# Patient Record
Sex: FEMALE | Race: WHITE | NOT HISPANIC OR LATINO | Employment: OTHER | URBAN - NONMETROPOLITAN AREA
[De-identification: names, ages, dates, MRNs, and addresses within clinical notes are randomized per-mention and may not be internally consistent; named-entity substitution may affect disease eponyms.]

---

## 2017-02-14 DIAGNOSIS — Z12.31 ENCOUNTER FOR SCREENING MAMMOGRAM FOR MALIGNANT NEOPLASM OF BREAST: Primary | ICD-10-CM

## 2017-02-15 ENCOUNTER — LAB (OUTPATIENT)
Dept: LAB | Facility: OTHER | Age: 72
End: 2017-02-15

## 2017-02-15 DIAGNOSIS — E78.00 HYPERCHOLESTEROLEMIA: ICD-10-CM

## 2017-02-15 DIAGNOSIS — E55.9 VITAMIN D DEFICIENCY: ICD-10-CM

## 2017-02-15 DIAGNOSIS — D51.9 ANEMIA DUE TO VITAMIN B12 DEFICIENCY: ICD-10-CM

## 2017-02-15 LAB
ALBUMIN SERPL-MCNC: 4.2 G/DL (ref 3.2–5.5)
ALBUMIN/GLOB SERPL: 1.2 G/DL (ref 1–3)
ALP SERPL-CCNC: 67 U/L (ref 15–121)
ALT SERPL W P-5'-P-CCNC: 13 U/L (ref 10–60)
ANION GAP SERPL CALCULATED.3IONS-SCNC: 7 MMOL/L (ref 5–15)
AST SERPL-CCNC: 19 U/L (ref 10–60)
BASOPHILS # BLD AUTO: 0.03 10*3/MM3 (ref 0–0.2)
BASOPHILS NFR BLD AUTO: 0.5 % (ref 0–2)
BILIRUB SERPL-MCNC: 0.9 MG/DL (ref 0.2–1)
BUN BLD-MCNC: 15 MG/DL (ref 8–25)
BUN/CREAT SERPL: 18.8 (ref 7–25)
CALCIUM SPEC-SCNC: 9.5 MG/DL (ref 8.4–10.8)
CHLORIDE SERPL-SCNC: 106 MMOL/L (ref 100–112)
CHOLEST SERPL-MCNC: 210 MG/DL (ref 150–200)
CO2 SERPL-SCNC: 29 MMOL/L (ref 20–32)
CREAT BLD-MCNC: 0.8 MG/DL (ref 0.4–1.3)
DEPRECATED RDW RBC AUTO: 42.7 FL (ref 36.4–46.3)
EOSINOPHIL # BLD AUTO: 0.33 10*3/MM3 (ref 0–0.7)
EOSINOPHIL NFR BLD AUTO: 6 % (ref 0–7)
ERYTHROCYTE [DISTWIDTH] IN BLOOD BY AUTOMATED COUNT: 13.4 % (ref 11.5–14.5)
GFR SERPL CREATININE-BSD FRML MDRD: 71 ML/MIN/1.73 (ref 39–90)
GLOBULIN UR ELPH-MCNC: 3.6 GM/DL (ref 2.5–4.6)
GLUCOSE BLD-MCNC: 88 MG/DL (ref 70–100)
HCT VFR BLD AUTO: 38.4 % (ref 35–45)
HDLC SERPL-MCNC: 70 MG/DL (ref 35–100)
HGB BLD-MCNC: 12.5 G/DL (ref 12–15.5)
LDLC SERPL CALC-MCNC: 126 MG/DL
LDLC/HDLC SERPL: 1.8 {RATIO}
LYMPHOCYTES # BLD AUTO: 2.29 10*3/MM3 (ref 0.6–4.2)
LYMPHOCYTES NFR BLD AUTO: 41.6 % (ref 10–50)
MCH RBC QN AUTO: 28.9 PG (ref 26.5–34)
MCHC RBC AUTO-ENTMCNC: 32.6 G/DL (ref 31.4–36)
MCV RBC AUTO: 88.9 FL (ref 80–98)
MONOCYTES # BLD AUTO: 0.47 10*3/MM3 (ref 0–0.9)
MONOCYTES NFR BLD AUTO: 8.5 % (ref 0–12)
NEUTROPHILS # BLD AUTO: 2.39 10*3/MM3 (ref 2–8.6)
NEUTROPHILS NFR BLD AUTO: 43.4 % (ref 37–80)
PLATELET # BLD AUTO: 282 10*3/MM3 (ref 150–450)
PMV BLD AUTO: 9.6 FL (ref 8–12)
POTASSIUM BLD-SCNC: 4 MMOL/L (ref 3.4–5.4)
PROT SERPL-MCNC: 7.8 G/DL (ref 6.7–8.2)
RBC # BLD AUTO: 4.32 10*6/MM3 (ref 3.77–5.16)
SODIUM BLD-SCNC: 142 MMOL/L (ref 134–146)
TRIGL SERPL-MCNC: 69 MG/DL (ref 35–160)
VLDLC SERPL-MCNC: 13.8 MG/DL
WBC NRBC COR # BLD: 5.51 10*3/MM3 (ref 3.2–9.8)

## 2017-02-15 PROCEDURE — 85025 COMPLETE CBC W/AUTO DIFF WBC: CPT | Performed by: INTERNAL MEDICINE

## 2017-02-15 PROCEDURE — 80061 LIPID PANEL: CPT | Performed by: INTERNAL MEDICINE

## 2017-02-15 PROCEDURE — 82607 VITAMIN B-12: CPT | Performed by: INTERNAL MEDICINE

## 2017-02-15 PROCEDURE — 80053 COMPREHEN METABOLIC PANEL: CPT | Performed by: INTERNAL MEDICINE

## 2017-02-15 PROCEDURE — 82306 VITAMIN D 25 HYDROXY: CPT | Performed by: INTERNAL MEDICINE

## 2017-02-15 PROCEDURE — 36415 COLL VENOUS BLD VENIPUNCTURE: CPT | Performed by: INTERNAL MEDICINE

## 2017-02-16 ENCOUNTER — OFFICE VISIT (OUTPATIENT)
Dept: FAMILY MEDICINE CLINIC | Facility: CLINIC | Age: 72
End: 2017-02-16

## 2017-02-16 VITALS
SYSTOLIC BLOOD PRESSURE: 124 MMHG | BODY MASS INDEX: 23.79 KG/M2 | HEIGHT: 59 IN | DIASTOLIC BLOOD PRESSURE: 70 MMHG | TEMPERATURE: 96.8 F | WEIGHT: 118 LBS | HEART RATE: 88 BPM

## 2017-02-16 DIAGNOSIS — E55.9 VITAMIN D DEFICIENCY: ICD-10-CM

## 2017-02-16 DIAGNOSIS — E78.00 HYPERCHOLESTEROLEMIA: ICD-10-CM

## 2017-02-16 DIAGNOSIS — M81.0 OSTEOPOROSIS: ICD-10-CM

## 2017-02-16 DIAGNOSIS — K21.9 GASTROESOPHAGEAL REFLUX DISEASE, ESOPHAGITIS PRESENCE NOT SPECIFIED: ICD-10-CM

## 2017-02-16 DIAGNOSIS — I10 ESSENTIAL (PRIMARY) HYPERTENSION: Primary | ICD-10-CM

## 2017-02-16 DIAGNOSIS — D51.9 ANEMIA DUE TO VITAMIN B12 DEFICIENCY, UNSPECIFIED B12 DEFICIENCY TYPE: ICD-10-CM

## 2017-02-16 LAB
25(OH)D3 SERPL-MCNC: 43.1 NG/ML (ref 30–100)
VIT B12 BLD-MCNC: 734 PG/ML (ref 239–931)

## 2017-02-16 PROCEDURE — 99214 OFFICE O/P EST MOD 30 MIN: CPT | Performed by: INTERNAL MEDICINE

## 2017-02-16 NOTE — PROGRESS NOTES
"Subjective     Court Johnson is a 71 y.o. female.     History of Present Illness   Court is here for six-month follow-up of hypertension, vitamin D deficiency, osteoporosis, anemia due to vitamin B12 deficiency, and other issues.    She will be due for a repeat DEXA approximately March 2018.  Last DEXA demonstrated considerable improvement with Prolia injections.  She will receive Prolia injections twice this year and then anticipate repeating the DEXA in March 2018 prior to further DEXA injections.  Use calcium and vitamin D supplement.  She denies frequent falls.    Her blood pressure is well controlled today with lisinopril.  Her weight is stable.    Her labs are all reviewed with results listed below.  CBC and CMP are unremarkable.  B12 and vitamin D levels are at goal.  Lipids are at goal with an excellent ratio.      Review of Systems   Constitutional: Negative for chills, fatigue and fever.   HENT: Negative for congestion, ear pain, postnasal drip, sinus pressure and sore throat.    Respiratory: Negative for cough, shortness of breath and wheezing.    Cardiovascular: Negative for chest pain, palpitations and leg swelling.   Gastrointestinal: Negative for abdominal pain, blood in stool, constipation, diarrhea, nausea and vomiting.   Endocrine: Negative for cold intolerance, heat intolerance, polydipsia and polyuria.   Genitourinary: Negative for dysuria, frequency, hematuria and urgency.   Skin: Negative for rash.   Neurological: Negative for syncope and weakness.   Psychiatric/Behavioral: Negative for sleep disturbance.       Objective     Visit Vitals   • /70   • Pulse 88   • Temp 96.8 °F (36 °C) (Oral)   • Ht 59\" (149.9 cm)   • Wt 118 lb (53.5 kg)   • BMI 23.83 kg/m2       Physical Exam   Constitutional: She is oriented to person, place, and time. She appears well-developed and well-nourished. No distress.   Very pleasant patient   HENT:   Nose: Right sinus exhibits no maxillary sinus " tenderness and no frontal sinus tenderness. Left sinus exhibits no maxillary sinus tenderness and no frontal sinus tenderness.   Mouth/Throat: Uvula is midline, oropharynx is clear and moist and mucous membranes are normal. No oral lesions. No tonsillar exudate.   Eyes: Conjunctivae and EOM are normal. Pupils are equal, round, and reactive to light.   Neck: Trachea normal. Neck supple. No JVD present. Carotid bruit is not present. No tracheal deviation present. No thyroid mass and no thyromegaly present.   Cardiovascular: Normal rate and regular rhythm.   No extrasystoles are present. PMI is not displaced.    Murmur (2/6 systolic murmur heard best at left sternal border.  No significant peripheral edema) heard.  Pulmonary/Chest: No accessory muscle usage. No respiratory distress. She has no decreased breath sounds. She has no wheezes. She has no rhonchi. She has no rales.   A few mild chronic lung sounds   Abdominal: Soft. Bowel sounds are normal. She exhibits no distension. There is no hepatosplenomegaly. There is no tenderness.       Vascular Status -  Her exam exhibits right foot vasculature abnormal and no right foot edema. Her exam exhibits left foot vasculature abnormal and no left foot edema.  Lymphadenopathy:     She has no cervical adenopathy.   Neurological: She is alert and oriented to person, place, and time. No cranial nerve deficit. Coordination normal.   Skin: Skin is warm, dry and intact. No rash noted. No cyanosis. Nails show no clubbing.   Psychiatric: She has a normal mood and affect. Her speech is normal and behavior is normal. Thought content normal.   Vitals reviewed.      Assessment/Plan     Diagnoses and all orders for this visit:    Essential (primary) hypertension  -     Comprehensive Metabolic Panel; Future    Hypercholesterolemia    Vitamin D deficiency  -     Vitamin D 25 Hydroxy; Future    Gastroesophageal reflux disease, esophagitis presence not specified    Anemia due to vitamin B12  deficiency, unspecified B12 deficiency type  -     Vitamin B12; Future  -     CBC Auto Differential; Future    Osteoporosis  -     Vitamin D 25 Hydroxy; Future    Other orders  -     PROLIA 60 MG/ML solution syringe; Inject 1 dose under the skin Every 6 (Six) Months.        Lab on 02/15/2017   Component Date Value Ref Range Status   • WBC 02/15/2017 5.51  3.20 - 9.80 10*3/mm3 Final   • RBC 02/15/2017 4.32  3.77 - 5.16 10*6/mm3 Final   • Hemoglobin 02/15/2017 12.5  12.0 - 15.5 g/dL Final   • Hematocrit 02/15/2017 38.4  35.0 - 45.0 % Final   • MCV 02/15/2017 88.9  80.0 - 98.0 fL Final   • MCH 02/15/2017 28.9  26.5 - 34.0 pg Final   • MCHC 02/15/2017 32.6  31.4 - 36.0 g/dL Final   • RDW 02/15/2017 13.4  11.5 - 14.5 % Final   • RDW-SD 02/15/2017 42.7  36.4 - 46.3 fl Final   • MPV 02/15/2017 9.6  8.0 - 12.0 fL Final   • Platelets 02/15/2017 282  150 - 450 10*3/mm3 Final   • Neutrophil % 02/15/2017 43.4  37.0 - 80.0 % Final   • Lymphocyte % 02/15/2017 41.6  10.0 - 50.0 % Final   • Monocyte % 02/15/2017 8.5  0.0 - 12.0 % Final   • Eosinophil % 02/15/2017 6.0  0.0 - 7.0 % Final   • Basophil % 02/15/2017 0.5  0.0 - 2.0 % Final   • Neutrophils, Absolute 02/15/2017 2.39  2.00 - 8.60 10*3/mm3 Final   • Lymphocytes, Absolute 02/15/2017 2.29  0.60 - 4.20 10*3/mm3 Final   • Monocytes, Absolute 02/15/2017 0.47  0.00 - 0.90 10*3/mm3 Final   • Eosinophils, Absolute 02/15/2017 0.33  0.00 - 0.70 10*3/mm3 Final   • Basophils, Absolute 02/15/2017 0.03  0.00 - 0.20 10*3/mm3 Final   • Total Cholesterol 02/15/2017 210* 150 - 200 mg/dL Final   • Triglycerides 02/15/2017 69  35 - 160 mg/dL Final   • HDL Cholesterol 02/15/2017 70  35 - 100 mg/dL Final   • LDL Cholesterol  02/15/2017 126  mg/dL Final   • VLDL Cholesterol 02/15/2017 13.8  mg/dL Final   • LDL/HDL Ratio 02/15/2017 1.80   Final   • 25 Hydroxy, Vitamin D 02/15/2017 43.1  30.0 - 100.0 ng/ml Final   • Vitamin B-12 02/15/2017 734  239 - 931 pg/mL Final   • Glucose 02/15/2017 88  70 -  100 mg/dL Final   • BUN 02/15/2017 15  8 - 25 mg/dL Final   • Creatinine 02/15/2017 0.80  0.40 - 1.30 mg/dL Final   • Sodium 02/15/2017 142  134 - 146 mmol/L Final   • Potassium 02/15/2017 4.0  3.4 - 5.4 mmol/L Final   • Chloride 02/15/2017 106  100 - 112 mmol/L Final   • CO2 02/15/2017 29.0  20.0 - 32.0 mmol/L Final   • Calcium 02/15/2017 9.5  8.4 - 10.8 mg/dL Final   • Total Protein 02/15/2017 7.8  6.7 - 8.2 g/dL Final   • Albumin 02/15/2017 4.20  3.20 - 5.50 g/dL Final   • ALT (SGPT) 02/15/2017 13  10 - 60 U/L Final   • AST (SGOT) 02/15/2017 19  10 - 60 U/L Final   • Alkaline Phosphatase 02/15/2017 67  15 - 121 U/L Final   • Total Bilirubin 02/15/2017 0.9  0.2 - 1.0 mg/dL Final   • eGFR Non African Amer 02/15/2017 71  39 - 90 mL/min/1.73 Final   • Globulin 02/15/2017 3.6  2.5 - 4.6 gm/dL Final   • A/G Ratio 02/15/2017 1.2  1.0 - 3.0 g/dL Final   • BUN/Creatinine Ratio 02/15/2017 18.8  7.0 - 25.0 Final   • Anion Gap 02/15/2017 7.0  5.0 - 15.0 mmol/L Final   ]

## 2017-03-15 ENCOUNTER — CLINICAL SUPPORT (OUTPATIENT)
Dept: FAMILY MEDICINE CLINIC | Facility: CLINIC | Age: 72
End: 2017-03-15

## 2017-03-15 DIAGNOSIS — M81.0 OSTEOPOROSIS: Primary | ICD-10-CM

## 2017-03-15 PROCEDURE — 96372 THER/PROPH/DIAG INJ SC/IM: CPT | Performed by: INTERNAL MEDICINE

## 2017-04-10 RX ORDER — LISINOPRIL 20 MG/1
TABLET ORAL
Qty: 30 TABLET | Refills: 0 | Status: SHIPPED | OUTPATIENT
Start: 2017-04-10 | End: 2017-05-15 | Stop reason: SDUPTHER

## 2017-05-15 RX ORDER — LISINOPRIL 20 MG/1
TABLET ORAL
Qty: 30 TABLET | Refills: 11 | Status: SHIPPED | OUTPATIENT
Start: 2017-05-15 | End: 2018-02-19 | Stop reason: SDUPTHER

## 2017-08-09 ENCOUNTER — LAB (OUTPATIENT)
Dept: LAB | Facility: OTHER | Age: 72
End: 2017-08-09

## 2017-08-09 DIAGNOSIS — E55.9 VITAMIN D DEFICIENCY: ICD-10-CM

## 2017-08-09 DIAGNOSIS — M81.0 OSTEOPOROSIS: ICD-10-CM

## 2017-08-09 DIAGNOSIS — D51.9 ANEMIA DUE TO VITAMIN B12 DEFICIENCY, UNSPECIFIED B12 DEFICIENCY TYPE: ICD-10-CM

## 2017-08-09 DIAGNOSIS — I10 ESSENTIAL (PRIMARY) HYPERTENSION: ICD-10-CM

## 2017-08-09 LAB
ALBUMIN SERPL-MCNC: 4.1 G/DL (ref 3.2–5.5)
ALBUMIN/GLOB SERPL: 1.2 G/DL (ref 1–3)
ALP SERPL-CCNC: 64 U/L (ref 15–121)
ALT SERPL W P-5'-P-CCNC: 14 U/L (ref 10–60)
ANION GAP SERPL CALCULATED.3IONS-SCNC: 10 MMOL/L (ref 5–15)
AST SERPL-CCNC: 18 U/L (ref 10–60)
BASOPHILS # BLD AUTO: 0.02 10*3/MM3 (ref 0–0.2)
BASOPHILS NFR BLD AUTO: 0.4 % (ref 0–2)
BILIRUB SERPL-MCNC: 0.7 MG/DL (ref 0.2–1)
BUN BLD-MCNC: 12 MG/DL (ref 8–25)
BUN/CREAT SERPL: 13.3 (ref 7–25)
CALCIUM SPEC-SCNC: 9.1 MG/DL (ref 8.4–10.8)
CHLORIDE SERPL-SCNC: 104 MMOL/L (ref 100–112)
CO2 SERPL-SCNC: 28 MMOL/L (ref 20–32)
CREAT BLD-MCNC: 0.9 MG/DL (ref 0.4–1.3)
DEPRECATED RDW RBC AUTO: 42.4 FL (ref 36.4–46.3)
EOSINOPHIL # BLD AUTO: 0.37 10*3/MM3 (ref 0–0.7)
EOSINOPHIL NFR BLD AUTO: 7.5 % (ref 0–7)
ERYTHROCYTE [DISTWIDTH] IN BLOOD BY AUTOMATED COUNT: 13.3 % (ref 11.5–14.5)
GFR SERPL CREATININE-BSD FRML MDRD: 62 ML/MIN/1.73 (ref 39–90)
GLOBULIN UR ELPH-MCNC: 3.4 GM/DL (ref 2.5–4.6)
GLUCOSE BLD-MCNC: 94 MG/DL (ref 70–100)
HCT VFR BLD AUTO: 37.4 % (ref 35–45)
HGB BLD-MCNC: 12.3 G/DL (ref 12–15.5)
LYMPHOCYTES # BLD AUTO: 1.77 10*3/MM3 (ref 0.6–4.2)
LYMPHOCYTES NFR BLD AUTO: 35.7 % (ref 10–50)
MCH RBC QN AUTO: 29.4 PG (ref 26.5–34)
MCHC RBC AUTO-ENTMCNC: 32.9 G/DL (ref 31.4–36)
MCV RBC AUTO: 89.3 FL (ref 80–98)
MONOCYTES # BLD AUTO: 0.46 10*3/MM3 (ref 0–0.9)
MONOCYTES NFR BLD AUTO: 9.3 % (ref 0–12)
NEUTROPHILS # BLD AUTO: 2.34 10*3/MM3 (ref 2–8.6)
NEUTROPHILS NFR BLD AUTO: 47.1 % (ref 37–80)
PLATELET # BLD AUTO: 243 10*3/MM3 (ref 150–450)
PMV BLD AUTO: 10 FL (ref 8–12)
POTASSIUM BLD-SCNC: 4.4 MMOL/L (ref 3.4–5.4)
PROT SERPL-MCNC: 7.5 G/DL (ref 6.7–8.2)
RBC # BLD AUTO: 4.19 10*6/MM3 (ref 3.77–5.16)
SODIUM BLD-SCNC: 142 MMOL/L (ref 134–146)
WBC NRBC COR # BLD: 4.96 10*3/MM3 (ref 3.2–9.8)

## 2017-08-09 PROCEDURE — 82607 VITAMIN B-12: CPT | Performed by: INTERNAL MEDICINE

## 2017-08-09 PROCEDURE — 82306 VITAMIN D 25 HYDROXY: CPT | Performed by: INTERNAL MEDICINE

## 2017-08-09 PROCEDURE — 80053 COMPREHEN METABOLIC PANEL: CPT | Performed by: INTERNAL MEDICINE

## 2017-08-09 PROCEDURE — 36415 COLL VENOUS BLD VENIPUNCTURE: CPT | Performed by: INTERNAL MEDICINE

## 2017-08-09 PROCEDURE — 85025 COMPLETE CBC W/AUTO DIFF WBC: CPT | Performed by: INTERNAL MEDICINE

## 2017-08-10 LAB
25(OH)D3 SERPL-MCNC: 53.8 NG/ML (ref 30–100)
VIT B12 BLD-MCNC: 748 PG/ML (ref 239–931)

## 2017-08-16 ENCOUNTER — OFFICE VISIT (OUTPATIENT)
Dept: FAMILY MEDICINE CLINIC | Facility: CLINIC | Age: 72
End: 2017-08-16

## 2017-08-16 VITALS
HEIGHT: 59 IN | TEMPERATURE: 98.1 F | WEIGHT: 112.8 LBS | SYSTOLIC BLOOD PRESSURE: 150 MMHG | BODY MASS INDEX: 22.74 KG/M2 | DIASTOLIC BLOOD PRESSURE: 80 MMHG | HEART RATE: 88 BPM

## 2017-08-16 DIAGNOSIS — I10 ESSENTIAL (PRIMARY) HYPERTENSION: Primary | Chronic | ICD-10-CM

## 2017-08-16 DIAGNOSIS — E55.9 VITAMIN D DEFICIENCY: Chronic | ICD-10-CM

## 2017-08-16 DIAGNOSIS — J30.1 SEASONAL ALLERGIC RHINITIS DUE TO POLLEN: Chronic | ICD-10-CM

## 2017-08-16 DIAGNOSIS — E78.00 HYPERCHOLESTEROLEMIA: Chronic | ICD-10-CM

## 2017-08-16 DIAGNOSIS — D51.9 ANEMIA DUE TO VITAMIN B12 DEFICIENCY, UNSPECIFIED B12 DEFICIENCY TYPE: Chronic | ICD-10-CM

## 2017-08-16 DIAGNOSIS — M81.0 OSTEOPOROSIS: Chronic | ICD-10-CM

## 2017-08-16 DIAGNOSIS — K21.9 GASTROESOPHAGEAL REFLUX DISEASE, ESOPHAGITIS PRESENCE NOT SPECIFIED: Chronic | ICD-10-CM

## 2017-08-16 PROCEDURE — 99214 OFFICE O/P EST MOD 30 MIN: CPT | Performed by: INTERNAL MEDICINE

## 2017-08-16 RX ORDER — ALENDRONATE SODIUM 70 MG/1
70 TABLET ORAL
Qty: 4 TABLET | Refills: 12 | Status: SHIPPED | OUTPATIENT
Start: 2017-08-16 | End: 2017-09-01 | Stop reason: SINTOL

## 2017-08-16 RX ORDER — RANITIDINE 150 MG/1
150 TABLET ORAL 2 TIMES DAILY
Qty: 60 TABLET | Refills: 11 | Status: SHIPPED | OUTPATIENT
Start: 2017-08-16 | End: 2018-10-05 | Stop reason: DRUGHIGH

## 2017-08-16 NOTE — PROGRESS NOTES
Subjective     History of Present Illness     Court Johnson is a 72 y.o. female who presents for six-month follow-up of hypertension, vitamin D deficiency, osteoporosis, anemia due to vitamin B12 deficiency, and other issues.  Her grandson, Guanaco, is currently living with her and her .  He is a senior at the local high school.  She denies GERD/gastritis symptoms.    DEXA 03/2016 revealed osteopenia, which demonstrated considerable improvement with Prolia injections.  Her next Prolia injection is due next month.  She reports she is unable to afford the Prolia injections, as these are not covered by her insurance.  Her previous injections were paid through a rd.  We discussed treatment options.  I am going to have her stop the Prolia injections and start weekly Fosamax.  She was intolerant to Boniva in the past due to unusual symptoms.  She reports that she felt like she could not move after her first dose of Fosamax.  It sounds like she may be describing reflux symptoms that triggered anxiety.  She denied anaphylactic reaction.  Her evaluation in the emergency room failed to reveal the etiology.  We discussed appropriate dosing directions and she is given a prescription for Zantac to premedicate with the Fosamax.  She continues on vitamin D and calcium supplements.  She is to notify me of any adverse reaction to the Fosamax.  She will be due repeat DEXA 03/2018.    She reports onset of postnasal drainage and clear nasal discharge.  She reports an episodic nonproductive cough.  I recommended Claritin (loratadine) 10 mg daily for symptom relief of allergy/URI symptoms.     Weight is down 6 pounds in the past six months.  Blood pressure is slightly above goal today, but she did not take it prior to her visit today.  She is to take her dose of  lisinopril when she gets back home.      The patient's relevant past medical, surgical, and social history was reviewed in Epic.   Lab results are reviewed with  "the patient today. CBC unremarkable.  Liver and renal function normal.  Vitamin D and vitamin B-12 at goal with current oral supplements.          Review of Systems   Constitutional: Negative for chills, fatigue and fever.   HENT: Negative for congestion, ear pain, postnasal drip, sinus pressure and sore throat.    Respiratory: Negative for cough, shortness of breath and wheezing.    Cardiovascular: Negative for chest pain, palpitations and leg swelling.   Gastrointestinal: Negative for abdominal pain, blood in stool, constipation, diarrhea, nausea and vomiting.   Endocrine: Negative for cold intolerance, heat intolerance, polydipsia and polyuria.   Genitourinary: Negative for dysuria, frequency, hematuria and urgency.   Skin: Negative for rash.   Neurological: Negative for syncope and weakness.      PHQ-9 Depression Screening 8/16/2017   Little interest or pleasure in doing things 0   Feeling down, depressed, or hopeless 0   PHQ-9 Total Score 0         Objective      Vitals:    08/16/17 0936   BP: 150/80   Pulse: 88   Temp: 98.1 °F (36.7 °C)   TempSrc: Oral   Weight: 112 lb 12.8 oz (51.2 kg)   Height: 59\" (149.9 cm)       Physical Exam   Constitutional: She is oriented to person, place, and time. She appears well-developed and well-nourished. No distress.   Very pleasant patient    HENT:   Head: Normocephalic and atraumatic.   Nose: Right sinus exhibits no maxillary sinus tenderness and no frontal sinus tenderness. Left sinus exhibits no maxillary sinus tenderness and no frontal sinus tenderness.   Mouth/Throat: Uvula is midline, oropharynx is clear and moist and mucous membranes are normal. No oral lesions. No tonsillar exudate.   Clear postnasal drip.    Eyes: Conjunctivae and EOM are normal. Pupils are equal, round, and reactive to light.   Neck: Trachea normal. Neck supple. No JVD present. Carotid bruit is not present. No tracheal deviation present. No thyroid mass and no thyromegaly present.   Cardiovascular: " Normal rate and regular rhythm.   No extrasystoles are present. PMI is not displaced.    Murmur heard.  2/6 systolic murmur heard best at left sternal border.   Pulmonary/Chest: Effort normal and breath sounds normal. No accessory muscle usage. No respiratory distress. She has no decreased breath sounds. She has no wheezes. She has no rhonchi. She has no rales.   A few chronic lung sounds.    Abdominal: Soft. Bowel sounds are normal. She exhibits no distension. There is no hepatosplenomegaly. There is no tenderness.       Vascular Status -  Her exam exhibits right foot vasculature normal. Her exam exhibits no right foot edema. Her exam exhibits left foot vasculature normal. Her exam exhibits no left foot edema.  Lymphadenopathy:     She has no cervical adenopathy.   Neurological: She is alert and oriented to person, place, and time. No cranial nerve deficit. Coordination normal.   Skin: Skin is warm, dry and intact. No rash noted. No cyanosis. Nails show no clubbing.   Psychiatric: She has a normal mood and affect. Her speech is normal and behavior is normal. Thought content normal.   Vitals reviewed.      PHQ-9 Depression Screening 8/16/2017   Little interest or pleasure in doing things 0   Feeling down, depressed, or hopeless 0   PHQ-9 Total Score 0       Assessment/Plan      Due to cost, we will stop Prolia injections and start Fosamax 70 mg weekly.  Discussed appropriate dosing directions.  She is also given a prescription for Zantac to premedicate prior to Fosamax. She is to take one tablet of Zantac the evening before and one tablet the morning she is taking the Fosamax.  She is to notify me of any adverse reaction to the Fosamax.  Continue vitamin D and calcium supplements.  She will be due repeat DEXA 03/2018.      I recommended Claritin (loratadine) 10 mg daily for the upper respiratory/allergy symptoms.     She is to take her dose of lisinopril when she gets home this morning.  Continue to monitor blood  pressure.     Continue other medications and vitamin and mineral supplements to treat additional medical problems which we addressed today.  She will return in six months for follow up with fasting labs one week prior.       Scribed for Dr. Heart by Oles[facility].     Diagnoses and all orders for this visit:    Essential (primary) hypertension  -     CBC Auto Differential; Future  -     Comprehensive Metabolic Panel; Future  -     TSH; Future    Hypercholesterolemia  -     Lipid Panel; Future  -     TSH; Future    Vitamin D deficiency    Gastroesophageal reflux disease, esophagitis presence not specified    Anemia due to vitamin B12 deficiency, unspecified B12 deficiency type  -     Vitamin B12; Future  -     CBC Auto Differential; Future    Osteoporosis - changed Prolia to Fosamax due to cost, 8/2017  -     Vitamin D 25 Hydroxy; Future  -     TSH; Future    Seasonal allergic rhinitis due to pollen    Other orders  -     alendronate (FOSAMAX) 70 MG tablet; Take 1 tablet by mouth Every 7 (Seven) Days.  -     raNITIdine (ZANTAC) 150 MG tablet; Take 1 tablet by mouth 2 (Two) Times a Day. For stomach acid        Lab on 08/09/2017   Component Date Value Ref Range Status   • Glucose 08/09/2017 94  70 - 100 mg/dL Final   • BUN 08/09/2017 12  8 - 25 mg/dL Final   • Creatinine 08/09/2017 0.90  0.40 - 1.30 mg/dL Final   • Sodium 08/09/2017 142  134 - 146 mmol/L Final   • Potassium 08/09/2017 4.4  3.4 - 5.4 mmol/L Final   • Chloride 08/09/2017 104  100 - 112 mmol/L Final   • CO2 08/09/2017 28.0  20.0 - 32.0 mmol/L Final   • Calcium 08/09/2017 9.1  8.4 - 10.8 mg/dL Final   • Total Protein 08/09/2017 7.5  6.7 - 8.2 g/dL Final   • Albumin 08/09/2017 4.10  3.20 - 5.50 g/dL Final   • ALT (SGPT) 08/09/2017 14  10 - 60 U/L Final   • AST (SGOT) 08/09/2017 18  10 - 60 U/L Final   • Alkaline Phosphatase 08/09/2017 64  15 - 121 U/L Final   • Total Bilirubin 08/09/2017 0.7  0.2 - 1.0 mg/dL Final   • eGFR Non African Amer  08/09/2017 62  39 - 90 mL/min/1.73 Final   • Globulin 08/09/2017 3.4  2.5 - 4.6 gm/dL Final   • A/G Ratio 08/09/2017 1.2  1.0 - 3.0 g/dL Final   • BUN/Creatinine Ratio 08/09/2017 13.3  7.0 - 25.0 Final   • Anion Gap 08/09/2017 10.0  5.0 - 15.0 mmol/L Final   • Vitamin B-12 08/09/2017 748  239 - 931 pg/mL Final   • 25 Hydroxy, Vitamin D 08/09/2017 53.8  30.0 - 100.0 ng/ml Final   • WBC 08/09/2017 4.96  3.20 - 9.80 10*3/mm3 Final   • RBC 08/09/2017 4.19  3.77 - 5.16 10*6/mm3 Final   • Hemoglobin 08/09/2017 12.3  12.0 - 15.5 g/dL Final   • Hematocrit 08/09/2017 37.4  35.0 - 45.0 % Final   • MCV 08/09/2017 89.3  80.0 - 98.0 fL Final   • MCH 08/09/2017 29.4  26.5 - 34.0 pg Final   • MCHC 08/09/2017 32.9  31.4 - 36.0 g/dL Final   • RDW 08/09/2017 13.3  11.5 - 14.5 % Final   • RDW-SD 08/09/2017 42.4  36.4 - 46.3 fl Final   • MPV 08/09/2017 10.0  8.0 - 12.0 fL Final   • Platelets 08/09/2017 243  150 - 450 10*3/mm3 Final   • Neutrophil % 08/09/2017 47.1  37.0 - 80.0 % Final   • Lymphocyte % 08/09/2017 35.7  10.0 - 50.0 % Final   • Monocyte % 08/09/2017 9.3  0.0 - 12.0 % Final   • Eosinophil % 08/09/2017 7.5* 0.0 - 7.0 % Final   • Basophil % 08/09/2017 0.4  0.0 - 2.0 % Final   • Neutrophils, Absolute 08/09/2017 2.34  2.00 - 8.60 10*3/mm3 Final   • Lymphocytes, Absolute 08/09/2017 1.77  0.60 - 4.20 10*3/mm3 Final   • Monocytes, Absolute 08/09/2017 0.46  0.00 - 0.90 10*3/mm3 Final   • Eosinophils, Absolute 08/09/2017 0.37  0.00 - 0.70 10*3/mm3 Final   • Basophils, Absolute 08/09/2017 0.02  0.00 - 0.20 10*3/mm3 Final   ]

## 2017-09-01 ENCOUNTER — OFFICE VISIT (OUTPATIENT)
Dept: FAMILY MEDICINE CLINIC | Facility: CLINIC | Age: 72
End: 2017-09-01

## 2017-09-01 VITALS
HEIGHT: 59 IN | TEMPERATURE: 98 F | HEART RATE: 92 BPM | WEIGHT: 110.6 LBS | DIASTOLIC BLOOD PRESSURE: 80 MMHG | BODY MASS INDEX: 22.3 KG/M2 | SYSTOLIC BLOOD PRESSURE: 130 MMHG

## 2017-09-01 DIAGNOSIS — M81.0 OSTEOPOROSIS: Chronic | ICD-10-CM

## 2017-09-01 DIAGNOSIS — T50.905A MEDICATION SIDE EFFECT, INITIAL ENCOUNTER: Primary | ICD-10-CM

## 2017-09-01 DIAGNOSIS — K21.9 GASTROESOPHAGEAL REFLUX DISEASE WITHOUT ESOPHAGITIS: Chronic | ICD-10-CM

## 2017-09-01 PROCEDURE — 99213 OFFICE O/P EST LOW 20 MIN: CPT | Performed by: INTERNAL MEDICINE

## 2017-09-01 RX ORDER — MELOXICAM 15 MG/1
15 TABLET ORAL DAILY PRN
Qty: 15 TABLET | Refills: 0 | Status: SHIPPED | OUTPATIENT
Start: 2017-09-01 | End: 2021-03-16

## 2017-09-01 NOTE — PROGRESS NOTES
"Subjective        History of Present Illness     Court Johnson is a 72 y.o. female reporting edema and arthralgia pain, which she describes as a burning discomfort to the bilateral hands since she started on Fosamax. DEXA 02/2016 revealed osteopenia, which demonstrated considerable improvement with Prolia injections. However, she was unable to afford the Prolia and was intolerant to Boniva in the past due to unusual symptoms (feeling paralyzed).  We started her on Fosamax with a prescription for Zantac to premedicate with the Fosamax.  She didn't have symptoms with the first dose of Fosamax, but noticed the edema of the hands with a burning pain on Monday evening after taking her second dose of Fosamax Monday morning.  The discomfort has disrupted her sleep the past two nights.  She took Ibuprofen for the discomfort.   We discussed other options including Forteo, but that would be too expensive as well.  We will have her continue with calcium and vitamin D supplements and repeat her DEXA in February 2018 to re-evaluate bone density.    Review of Systems   Constitutional: Negative for chills, fatigue and fever.   HENT: Negative for congestion, ear pain, postnasal drip, sinus pressure and sore throat.    Respiratory: Negative for cough, shortness of breath and wheezing.    Cardiovascular: Negative for chest pain, palpitations and leg swelling.   Gastrointestinal: Negative for abdominal pain, blood in stool, constipation, diarrhea, nausea and vomiting.   Endocrine: Negative for cold intolerance, heat intolerance, polydipsia and polyuria.   Genitourinary: Negative for dysuria, frequency, hematuria and urgency.   Musculoskeletal:        Swelling and pain to bilateral hands.    Skin: Negative for rash.   Neurological: Negative for syncope and weakness.        Objective     Vitals:    09/01/17 0922   BP: 130/80   Pulse: 92   Temp: 98 °F (36.7 °C)   TempSrc: Oral   Weight: 110 lb 9.6 oz (50.2 kg)   Height: 59\" (149.9 " cm)     Physical Exam   Constitutional: She is oriented to person, place, and time. She appears well-developed and well-nourished. No distress.   Very pleasant female.      HENT:   Head: Normocephalic and atraumatic.   Nose: Nose normal.   Mouth/Throat: Oropharynx is clear and moist. No oropharyngeal exudate.   Eyes: EOM are normal. Pupils are equal, round, and reactive to light.   Neck: Neck supple. No JVD present. No thyromegaly present.   Cardiovascular: Normal rate and regular rhythm.    Murmur heard.  2/6 systolic murmur heard best at left sternal border.    Pulmonary/Chest: Effort normal and breath sounds normal. No accessory muscle usage. No respiratory distress. She has no wheezes. She has no rales.   A few chronic lung sounds.    Abdominal: Soft. Bowel sounds are normal. She exhibits no distension. There is no tenderness.   Musculoskeletal: She exhibits no edema.   Lymphadenopathy:     She has no cervical adenopathy.   Neurological: She is alert and oriented to person, place, and time. No cranial nerve deficit.   Psychiatric: She has a normal mood and affect. Her speech is normal and behavior is normal.       Future Appointments  Date Time Provider Department Center   2/19/2018 9:30 AM Andres Heart MD [facility] None     Assessment/Plan      Stop the Fosamax and this is listed as an allergy/Intolerance.   She is given Mobic 15 mg one tablet daily when necessary with food for the next two weeks for her wrist pain.  She has the Zantac at home, which she can continue to use for GI upset/GERD.  She can also take Tylenol per label directions in combination with the Mobic.    Continue vitamin D and calcium supplements.  She will be due repeat DEXA 03/2018.  Prolia was working well, but it was going to be over $700 for the next injection.  Continue other medications and vitamin and mineral supplements to treat additional medical problems which we addressed today.  She has an appointment scheduled in February 2018  for routine follow up visit with fasting labs one week prior.  If her bone density is significantly worse, we could see if her insurance would cover Forteo injections better than the Prolia.  Evista might be an option     Scribed for Dr. Heart by [facility].     Diagnoses and all orders for this visit:    Medication side effect, initial encounter    Osteoporosis    Gastroesophageal reflux disease without esophagitis    Other orders  -     meloxicam (MOBIC) 15 MG tablet; Take 1 tablet by mouth Daily As Needed (pain). Take with food      No visits with results within 3 Week(s) from this visit.  Latest known visit with results is:    Lab on 08/09/2017   Component Date Value Ref Range Status   • Glucose 08/09/2017 94  70 - 100 mg/dL Final   • BUN 08/09/2017 12  8 - 25 mg/dL Final   • Creatinine 08/09/2017 0.90  0.40 - 1.30 mg/dL Final   • Sodium 08/09/2017 142  134 - 146 mmol/L Final   • Potassium 08/09/2017 4.4  3.4 - 5.4 mmol/L Final   • Chloride 08/09/2017 104  100 - 112 mmol/L Final   • CO2 08/09/2017 28.0  20.0 - 32.0 mmol/L Final   • Calcium 08/09/2017 9.1  8.4 - 10.8 mg/dL Final   • Total Protein 08/09/2017 7.5  6.7 - 8.2 g/dL Final   • Albumin 08/09/2017 4.10  3.20 - 5.50 g/dL Final   • ALT (SGPT) 08/09/2017 14  10 - 60 U/L Final   • AST (SGOT) 08/09/2017 18  10 - 60 U/L Final   • Alkaline Phosphatase 08/09/2017 64  15 - 121 U/L Final   • Total Bilirubin 08/09/2017 0.7  0.2 - 1.0 mg/dL Final   • eGFR Non  Amer 08/09/2017 62  39 - 90 mL/min/1.73 Final   • Globulin 08/09/2017 3.4  2.5 - 4.6 gm/dL Final   • A/G Ratio 08/09/2017 1.2  1.0 - 3.0 g/dL Final   • BUN/Creatinine Ratio 08/09/2017 13.3  7.0 - 25.0 Final   • Anion Gap 08/09/2017 10.0  5.0 - 15.0 mmol/L Final   • Vitamin B-12 08/09/2017 748  239 - 931 pg/mL Final   • 25 Hydroxy, Vitamin D 08/09/2017 53.8  30.0 - 100.0 ng/ml Final   • WBC 08/09/2017 4.96  3.20 - 9.80 10*3/mm3 Final   • RBC 08/09/2017 4.19  3.77 - 5.16 10*6/mm3 Final   •  Hemoglobin 08/09/2017 12.3  12.0 - 15.5 g/dL Final   • Hematocrit 08/09/2017 37.4  35.0 - 45.0 % Final   • MCV 08/09/2017 89.3  80.0 - 98.0 fL Final   • MCH 08/09/2017 29.4  26.5 - 34.0 pg Final   • MCHC 08/09/2017 32.9  31.4 - 36.0 g/dL Final   • RDW 08/09/2017 13.3  11.5 - 14.5 % Final   • RDW-SD 08/09/2017 42.4  36.4 - 46.3 fl Final   • MPV 08/09/2017 10.0  8.0 - 12.0 fL Final   • Platelets 08/09/2017 243  150 - 450 10*3/mm3 Final   • Neutrophil % 08/09/2017 47.1  37.0 - 80.0 % Final   • Lymphocyte % 08/09/2017 35.7  10.0 - 50.0 % Final   • Monocyte % 08/09/2017 9.3  0.0 - 12.0 % Final   • Eosinophil % 08/09/2017 7.5* 0.0 - 7.0 % Final   • Basophil % 08/09/2017 0.4  0.0 - 2.0 % Final   • Neutrophils, Absolute 08/09/2017 2.34  2.00 - 8.60 10*3/mm3 Final   • Lymphocytes, Absolute 08/09/2017 1.77  0.60 - 4.20 10*3/mm3 Final   • Monocytes, Absolute 08/09/2017 0.46  0.00 - 0.90 10*3/mm3 Final   • Eosinophils, Absolute 08/09/2017 0.37  0.00 - 0.70 10*3/mm3 Final   • Basophils, Absolute 08/09/2017 0.02  0.00 - 0.20 10*3/mm3 Final   ]

## 2017-09-29 ENCOUNTER — OFFICE VISIT (OUTPATIENT)
Dept: FAMILY MEDICINE CLINIC | Facility: CLINIC | Age: 72
End: 2017-09-29

## 2017-09-29 VITALS
HEART RATE: 103 BPM | DIASTOLIC BLOOD PRESSURE: 84 MMHG | SYSTOLIC BLOOD PRESSURE: 124 MMHG | BODY MASS INDEX: 22.01 KG/M2 | HEIGHT: 59 IN | TEMPERATURE: 98.3 F | WEIGHT: 109.2 LBS | OXYGEN SATURATION: 96 %

## 2017-09-29 DIAGNOSIS — J45.21 MILD INTERMITTENT ASTHMA WITH ACUTE EXACERBATION: Primary | ICD-10-CM

## 2017-09-29 DIAGNOSIS — J06.9 ACUTE URI: ICD-10-CM

## 2017-09-29 DIAGNOSIS — J30.1 SEASONAL ALLERGIC RHINITIS DUE TO POLLEN: ICD-10-CM

## 2017-09-29 PROCEDURE — 99213 OFFICE O/P EST LOW 20 MIN: CPT | Performed by: INTERNAL MEDICINE

## 2017-09-29 PROCEDURE — 96372 THER/PROPH/DIAG INJ SC/IM: CPT | Performed by: INTERNAL MEDICINE

## 2017-09-29 RX ORDER — ALBUTEROL SULFATE 90 UG/1
2 AEROSOL, METERED RESPIRATORY (INHALATION) 4 TIMES DAILY PRN
Qty: 1 INHALER | Refills: 2 | Status: SHIPPED | OUTPATIENT
Start: 2017-09-29 | End: 2019-02-25 | Stop reason: SDUPTHER

## 2017-09-29 RX ORDER — FLUTICASONE PROPIONATE 50 MCG
1 SPRAY, SUSPENSION (ML) NASAL 2 TIMES DAILY
Qty: 1 BOTTLE | Refills: 11 | Status: SHIPPED | OUTPATIENT
Start: 2017-09-29 | End: 2017-12-08 | Stop reason: SDUPTHER

## 2017-09-29 RX ORDER — GUAIFENESIN, PSEUDOEPHEDRINE HYDROCHLORIDE 600; 60 MG/1; MG/1
TABLET, EXTENDED RELEASE ORAL
Qty: 30 TABLET | Refills: 0 | Status: SHIPPED | OUTPATIENT
Start: 2017-09-29 | End: 2017-12-08

## 2017-09-29 RX ORDER — METHYLPREDNISOLONE ACETATE 80 MG/ML
80 INJECTION, SUSPENSION INTRA-ARTICULAR; INTRALESIONAL; INTRAMUSCULAR; SOFT TISSUE ONCE
Status: COMPLETED | OUTPATIENT
Start: 2017-09-29 | End: 2017-09-29

## 2017-09-29 RX ORDER — TRIAMCINOLONE ACETONIDE 40 MG/ML
20 INJECTION, SUSPENSION INTRA-ARTICULAR; INTRAMUSCULAR ONCE
Status: COMPLETED | OUTPATIENT
Start: 2017-09-29 | End: 2017-09-29

## 2017-09-29 RX ADMIN — TRIAMCINOLONE ACETONIDE 20 MG: 40 INJECTION, SUSPENSION INTRA-ARTICULAR; INTRAMUSCULAR at 15:31

## 2017-09-29 RX ADMIN — METHYLPREDNISOLONE ACETATE 80 MG: 80 INJECTION, SUSPENSION INTRA-ARTICULAR; INTRALESIONAL; INTRAMUSCULAR; SOFT TISSUE at 15:31

## 2017-09-29 NOTE — PROGRESS NOTES
"Subjective        History of Present Illness     Court Johnson is a 72 y.o. female reporting upper respiratory symptoms initially starting approximately two weeks ago with scratchy throat and postnasal drainage.  She reports thin, white to clear nasal discharge as well as episodes of frequent paroxysmal cough productive of clear sputum, worse at night.   She denies fever or chills.  She denies ear pain or headache.  She denies flu-like body aches.  She denies sick contacts.  She has been taking Mucinex every six hours.  She has been out of Albuterol inhaler and a refill is given today.     Review of Systems   Constitutional: Negative for chills, fatigue and fever.   HENT: Positive for congestion and postnasal drip. Negative for ear pain, sinus pressure and sore throat.    Respiratory: Positive for cough and wheezing. Negative for shortness of breath.    Cardiovascular: Negative for chest pain, palpitations and leg swelling.   Gastrointestinal: Negative for abdominal pain, blood in stool, constipation, diarrhea, nausea and vomiting.   Endocrine: Negative for cold intolerance, heat intolerance, polydipsia and polyuria.   Genitourinary: Negative for dysuria, frequency, hematuria and urgency.   Skin: Negative for rash.   Neurological: Negative for syncope and weakness.        Objective     Vitals:    09/29/17 1448   BP: 124/84   Pulse: 103   Temp: 98.3 °F (36.8 °C)   TempSrc: Oral   SpO2: 96%   Weight: 109 lb 3.2 oz (49.5 kg)   Height: 59\" (149.9 cm)       Physical Exam   Constitutional: She is oriented to person, place, and time. She appears well-developed and well-nourished. No distress.   HENT:   Head: Normocephalic and atraumatic.   Nose: Right sinus exhibits no maxillary sinus tenderness and no frontal sinus tenderness. Left sinus exhibits no maxillary sinus tenderness and no frontal sinus tenderness.   Mouth/Throat: Uvula is midline, oropharynx is clear and moist and mucous membranes are normal. No oral " lesions. No tonsillar exudate.   Nasal congestion and lots of clear postnasal drip.         Eyes: Conjunctivae and EOM are normal. Pupils are equal, round, and reactive to light.   Neck: Trachea normal. Neck supple. No JVD present. Carotid bruit is not present. No tracheal deviation present. No thyroid mass and no thyromegaly present.   Mild anterior cervical lymphadenopathy.     Cardiovascular: Normal rate, regular rhythm and normal heart sounds.   No extrasystoles are present. PMI is not displaced.    No murmur heard.  Pulmonary/Chest: Effort normal. No accessory muscle usage. No respiratory distress. She has no decreased breath sounds. She has wheezes. She has no rhonchi. She has no rales.   Increased bronchial sounds on the left and mild wheezes bilaterally.     Abdominal: Soft. Bowel sounds are normal. She exhibits no distension. There is no hepatosplenomegaly. There is no tenderness.       Vascular Status -  Her exam exhibits right foot vasculature normal. Her exam exhibits no right foot edema. Her exam exhibits left foot vasculature normal. Her exam exhibits no left foot edema.  Lymphadenopathy:     She has cervical adenopathy.   Neurological: She is alert and oriented to person, place, and time. No cranial nerve deficit. Coordination normal.   Skin: Skin is warm, dry and intact. No rash noted. No cyanosis. Nails show no clubbing.   Psychiatric: She has a normal mood and affect. Her speech is normal and behavior is normal. Thought content normal.   Vitals reviewed.      Future Appointments  Date Time Provider Department Center   2/19/2018 9:30 AM Andres Heart MD [facility] None       Assessment/Plan      She will stop the OTC Mucinex. A prescription for Mucinex D to take one each morning and an additional dose in the evening if needed, Flonase nasal spray to use one spray each nostril b.i.d., and Delsym cough syrup to use as directed.    Recommended Claritin (loratadine) 10 mg one q.d.  She is given a refill  on Albuterol inhaler to inhale 2 puffs 4 (four) times a day as needed.      After informed verbal consent, patient is given Kenalog 20 mg and Depo-Medrol 80 mg IM without difficulty or complications.  She tolerated well.     Symptoms appear to be viral at this point.  If she starts to have symptoms of bacterial infection, she can call and we will add antibiotic therapy.      Scribed for Dr. Heart by Olesya Bruno [facility].     Diagnoses and all orders for this visit:    Mild intermittent asthma with acute exacerbation  -     triamcinolone acetonide (KENALOG-40) injection 20 mg; Inject 0.5 mL into the shoulder, thigh, or buttocks 1 (One) Time.  -     methylPREDNISolone acetate (DEPO-medrol) injection 80 mg; Inject 1 mL into the shoulder, thigh, or buttocks 1 (One) Time.    Seasonal allergic rhinitis due to pollen  -     triamcinolone acetonide (KENALOG-40) injection 20 mg; Inject 0.5 mL into the shoulder, thigh, or buttocks 1 (One) Time.  -     methylPREDNISolone acetate (DEPO-medrol) injection 80 mg; Inject 1 mL into the shoulder, thigh, or buttocks 1 (One) Time.    Acute URI  -     triamcinolone acetonide (KENALOG-40) injection 20 mg; Inject 0.5 mL into the shoulder, thigh, or buttocks 1 (One) Time.  -     methylPREDNISolone acetate (DEPO-medrol) injection 80 mg; Inject 1 mL into the shoulder, thigh, or buttocks 1 (One) Time.    Other orders  -     albuterol (PROAIR HFA) 108 (90 Base) MCG/ACT inhaler; Inhale 2 puffs 4 (Four) Times a Day As Needed for Shortness of Air (cough). 90 mcg/actuation  -     fluticasone (FLONASE) 50 MCG/ACT nasal spray; 1 spray into each nostril 2 (Two) Times a Day. Administer 1 spray in each nostril twice daily.  -     pseudoephedrine-guaifenesin (MUCINEX D)  MG per 12 hr tablet; 1 po qam and may repeat 1 at supper prn congestion      No visits with results within 3 Week(s) from this visit.  Latest known visit with results is:    Lab on 08/09/2017   Component Date Value Ref Range  Status   • Glucose 08/09/2017 94  70 - 100 mg/dL Final   • BUN 08/09/2017 12  8 - 25 mg/dL Final   • Creatinine 08/09/2017 0.90  0.40 - 1.30 mg/dL Final   • Sodium 08/09/2017 142  134 - 146 mmol/L Final   • Potassium 08/09/2017 4.4  3.4 - 5.4 mmol/L Final   • Chloride 08/09/2017 104  100 - 112 mmol/L Final   • CO2 08/09/2017 28.0  20.0 - 32.0 mmol/L Final   • Calcium 08/09/2017 9.1  8.4 - 10.8 mg/dL Final   • Total Protein 08/09/2017 7.5  6.7 - 8.2 g/dL Final   • Albumin 08/09/2017 4.10  3.20 - 5.50 g/dL Final   • ALT (SGPT) 08/09/2017 14  10 - 60 U/L Final   • AST (SGOT) 08/09/2017 18  10 - 60 U/L Final   • Alkaline Phosphatase 08/09/2017 64  15 - 121 U/L Final   • Total Bilirubin 08/09/2017 0.7  0.2 - 1.0 mg/dL Final   • eGFR Non  Amer 08/09/2017 62  39 - 90 mL/min/1.73 Final   • Globulin 08/09/2017 3.4  2.5 - 4.6 gm/dL Final   • A/G Ratio 08/09/2017 1.2  1.0 - 3.0 g/dL Final   • BUN/Creatinine Ratio 08/09/2017 13.3  7.0 - 25.0 Final   • Anion Gap 08/09/2017 10.0  5.0 - 15.0 mmol/L Final   • Vitamin B-12 08/09/2017 748  239 - 931 pg/mL Final   • 25 Hydroxy, Vitamin D 08/09/2017 53.8  30.0 - 100.0 ng/ml Final   • WBC 08/09/2017 4.96  3.20 - 9.80 10*3/mm3 Final   • RBC 08/09/2017 4.19  3.77 - 5.16 10*6/mm3 Final   • Hemoglobin 08/09/2017 12.3  12.0 - 15.5 g/dL Final   • Hematocrit 08/09/2017 37.4  35.0 - 45.0 % Final   • MCV 08/09/2017 89.3  80.0 - 98.0 fL Final   • MCH 08/09/2017 29.4  26.5 - 34.0 pg Final   • MCHC 08/09/2017 32.9  31.4 - 36.0 g/dL Final   • RDW 08/09/2017 13.3  11.5 - 14.5 % Final   • RDW-SD 08/09/2017 42.4  36.4 - 46.3 fl Final   • MPV 08/09/2017 10.0  8.0 - 12.0 fL Final   • Platelets 08/09/2017 243  150 - 450 10*3/mm3 Final   • Neutrophil % 08/09/2017 47.1  37.0 - 80.0 % Final   • Lymphocyte % 08/09/2017 35.7  10.0 - 50.0 % Final   • Monocyte % 08/09/2017 9.3  0.0 - 12.0 % Final   • Eosinophil % 08/09/2017 7.5* 0.0 - 7.0 % Final   • Basophil % 08/09/2017 0.4  0.0 - 2.0 % Final   •  Neutrophils, Absolute 08/09/2017 2.34  2.00 - 8.60 10*3/mm3 Final   • Lymphocytes, Absolute 08/09/2017 1.77  0.60 - 4.20 10*3/mm3 Final   • Monocytes, Absolute 08/09/2017 0.46  0.00 - 0.90 10*3/mm3 Final   • Eosinophils, Absolute 08/09/2017 0.37  0.00 - 0.70 10*3/mm3 Final   • Basophils, Absolute 08/09/2017 0.02  0.00 - 0.20 10*3/mm3 Final   ]

## 2017-12-08 ENCOUNTER — OFFICE VISIT (OUTPATIENT)
Dept: FAMILY MEDICINE CLINIC | Facility: CLINIC | Age: 72
End: 2017-12-08

## 2017-12-08 VITALS
HEIGHT: 59 IN | HEART RATE: 86 BPM | OXYGEN SATURATION: 99 % | WEIGHT: 109.2 LBS | TEMPERATURE: 97 F | BODY MASS INDEX: 22.01 KG/M2 | DIASTOLIC BLOOD PRESSURE: 78 MMHG | SYSTOLIC BLOOD PRESSURE: 124 MMHG

## 2017-12-08 DIAGNOSIS — J06.9 VIRAL URI WITH COUGH: ICD-10-CM

## 2017-12-08 DIAGNOSIS — J30.1 ACUTE SEASONAL ALLERGIC RHINITIS DUE TO POLLEN: ICD-10-CM

## 2017-12-08 DIAGNOSIS — J45.21 MILD INTERMITTENT ASTHMA WITH ACUTE EXACERBATION: Primary | ICD-10-CM

## 2017-12-08 PROCEDURE — 99214 OFFICE O/P EST MOD 30 MIN: CPT | Performed by: INTERNAL MEDICINE

## 2017-12-08 PROCEDURE — 96372 THER/PROPH/DIAG INJ SC/IM: CPT | Performed by: INTERNAL MEDICINE

## 2017-12-08 RX ORDER — FLUTICASONE PROPIONATE 50 MCG
1 SPRAY, SUSPENSION (ML) NASAL 2 TIMES DAILY
Qty: 1 BOTTLE | Refills: 11 | Status: SHIPPED | OUTPATIENT
Start: 2017-12-08 | End: 2018-08-21 | Stop reason: SDUPTHER

## 2017-12-08 RX ORDER — TRIAMCINOLONE ACETONIDE 40 MG/ML
20 INJECTION, SUSPENSION INTRA-ARTICULAR; INTRAMUSCULAR ONCE
Status: COMPLETED | OUTPATIENT
Start: 2017-12-08 | End: 2017-12-08

## 2017-12-08 RX ORDER — MONTELUKAST SODIUM 10 MG/1
10 TABLET ORAL NIGHTLY
Qty: 30 TABLET | Refills: 0 | Status: SHIPPED | OUTPATIENT
Start: 2017-12-08 | End: 2018-08-21 | Stop reason: SDUPTHER

## 2017-12-08 RX ORDER — METHYLPREDNISOLONE ACETATE 80 MG/ML
80 INJECTION, SUSPENSION INTRA-ARTICULAR; INTRALESIONAL; INTRAMUSCULAR; SOFT TISSUE ONCE
Status: COMPLETED | OUTPATIENT
Start: 2017-12-08 | End: 2017-12-08

## 2017-12-08 RX ADMIN — TRIAMCINOLONE ACETONIDE 20 MG: 40 INJECTION, SUSPENSION INTRA-ARTICULAR; INTRAMUSCULAR at 09:56

## 2017-12-08 RX ADMIN — METHYLPREDNISOLONE ACETATE 80 MG: 80 INJECTION, SUSPENSION INTRA-ARTICULAR; INTRALESIONAL; INTRAMUSCULAR; SOFT TISSUE at 09:56

## 2017-12-08 NOTE — PROGRESS NOTES
"Subjective          History of Present Illness     Court Johnson is a 72 y.o. female who presents with a 2-week history of frequent, mostly nonproductive, paroxysmal cough.  She reports minimal production of clear sputum.  The cough occurs day and night and is keeping her awake at night   Initially, she had postnasal drip, but denies other upper repiratory symptoms including fever or chills.  She denies headache or flu-like body aches. Denies ear pain or nasal discharge.  She has tried taking Robitussin and Delsym as well as using the ProAir inhaler without improvement of the cough.  She is not using nasal steroids or antihistamines.  She denies uncontrolled GERD symptoms.  We discussed how symptoms and exam reveal evidence of viral exam and acute asthma flare.  I gave her a sample of Symbicort and demonstrated appropriate use.  She has some difficulty with appropriate use.  She is given a steroid injection and a prescription for Singulair.  She is to notify me for symptoms of infection such as fever or colored secretions.      Review of Systems   Constitutional: Negative for chills, fatigue and fever.   HENT: Negative for congestion, ear pain, postnasal drip, sinus pressure and sore throat.    Respiratory: Positive for cough and wheezing. Negative for shortness of breath.    Cardiovascular: Negative for chest pain, palpitations and leg swelling.   Gastrointestinal: Negative for abdominal pain, blood in stool, constipation, diarrhea, nausea and vomiting.   Endocrine: Negative for cold intolerance, heat intolerance, polydipsia and polyuria.   Genitourinary: Negative for dysuria, frequency, hematuria and urgency.   Skin: Negative for rash.   Neurological: Negative for syncope and weakness.        Objective     Vitals:    12/08/17 0918   BP: 124/78   Pulse: 86   Temp: 97 °F (36.1 °C)   TempSrc: Oral   SpO2: 99%   Weight: 49.5 kg (109 lb 3.2 oz)   Height: 149.9 cm (59\")     Physical Exam   Constitutional: She is " oriented to person, place, and time. She appears well-developed and well-nourished. No distress.   HENT:   Head: Normocephalic and atraumatic.   Nose: Nose normal.   Mouth/Throat: Oropharynx is clear and moist. No oropharyngeal exudate.   Mild clear postnasal drip.  No posterior erythema.        Eyes: EOM are normal. Pupils are equal, round, and reactive to light.   Neck: Neck supple. No JVD present. No thyromegaly present.   Cardiovascular: Normal rate, regular rhythm and normal heart sounds.    Pulmonary/Chest: Effort normal and breath sounds normal. No accessory muscle usage. No respiratory distress. She has no wheezes. She has no rales.    Expiratory wheezes.  No rhonchi.    Abdominal: Soft. Bowel sounds are normal. She exhibits no distension. There is no tenderness.   Musculoskeletal: She exhibits no edema.   Lymphadenopathy:     She has no cervical adenopathy.   Neurological: She is alert and oriented to person, place, and time. No cranial nerve deficit.   Psychiatric: She has a normal mood and affect. Her speech is normal and behavior is normal.     Future Appointments  Date Time Provider Department Center   2/19/2018 9:30 AM Andres Heart MD [facility] None       Assessment/Plan      A prescription for Singulair 10 mg is given to take one q.h.s. For up to one month    A sample of inhaler, Symbicort, is given to use two puffs b.i.d. with appropriate demonstrated use.  Instructed in oral care after use.  Continue to use ProAir when necessary, increasing the frequency of use.       After informed verbal consent, patient is given Kenalog 20 mg and Depo-Medrol 80 mg IM without difficulty or complications.  Patient tolerated well without complications.        If she starts to notice symptoms of bacterial infection including fever or discolored secretions, she can notify us and we can add antibiotic treatment.      Continue other medications and vitamin and mineral supplements to treat additional medical problems  which we addressed today.  She will return in February for routine follow up with fasting labs one week prior.         Scribed for Dr. Heart by Olesya Bruno [facility].     Diagnoses and all orders for this visit:    Mild intermittent asthma with acute exacerbation  -     triamcinolone acetonide (KENALOG-40) injection 20 mg; Inject 0.5 mL into the shoulder, thigh, or buttocks 1 (One) Time.  -     methylPREDNISolone acetate (DEPO-medrol) injection 80 mg; Inject 1 mL into the shoulder, thigh, or buttocks 1 (One) Time.    Viral URI with cough  -     triamcinolone acetonide (KENALOG-40) injection 20 mg; Inject 0.5 mL into the shoulder, thigh, or buttocks 1 (One) Time.  -     methylPREDNISolone acetate (DEPO-medrol) injection 80 mg; Inject 1 mL into the shoulder, thigh, or buttocks 1 (One) Time.    Acute seasonal allergic rhinitis due to pollen  -     triamcinolone acetonide (KENALOG-40) injection 20 mg; Inject 0.5 mL into the shoulder, thigh, or buttocks 1 (One) Time.  -     methylPREDNISolone acetate (DEPO-medrol) injection 80 mg; Inject 1 mL into the shoulder, thigh, or buttocks 1 (One) Time.    Other orders  -     fluticasone (FLONASE) 50 MCG/ACT nasal spray; 1 spray into each nostril 2 (Two) Times a Day. Administer 1 spray in each nostril twice daily.  -     montelukast (SINGULAIR) 10 MG tablet; Take 1 tablet by mouth Every Night.      No visits with results within 3 Week(s) from this visit.  Latest known visit with results is:    Admission on 10/03/2017, Discharged on 10/03/2017   Component Date Value Ref Range Status   • Color 10/03/2017 Yellow  Yellow, Straw, Dark Yellow, Ethel Final   • Clarity, UA 10/03/2017 Clear  Clear Final   • Glucose, UA 10/03/2017 Negative  Negative, 1000 mg/dL (3+) mg/dL Final   • Bilirubin 10/03/2017 Negative  Negative Final   • Ketones, UA 10/03/2017 Negative  Negative Final   • Specific Gravity  10/03/2017 1.010  1.005 - 1.030 Final   • Blood, UA 10/03/2017 Negative  Negative Final    • pH, Urine 10/03/2017 6.0  5.0 - 8.0 Final   • Protein, POC 10/03/2017 Negative  Negative mg/dL Final   • Urobilinogen, UA 10/03/2017 1 E.U./dL * Normal Final   • Leukocytes 10/03/2017 Negative  Negative Final   • Nitrite, UA 10/03/2017 Negative  Negative Final   ]

## 2018-02-12 ENCOUNTER — LAB (OUTPATIENT)
Dept: LAB | Facility: OTHER | Age: 73
End: 2018-02-12

## 2018-02-12 DIAGNOSIS — E78.00 HYPERCHOLESTEROLEMIA: Chronic | ICD-10-CM

## 2018-02-12 DIAGNOSIS — I10 ESSENTIAL (PRIMARY) HYPERTENSION: Chronic | ICD-10-CM

## 2018-02-12 DIAGNOSIS — M81.0 OSTEOPOROSIS: Chronic | ICD-10-CM

## 2018-02-12 DIAGNOSIS — D51.9 ANEMIA DUE TO VITAMIN B12 DEFICIENCY, UNSPECIFIED B12 DEFICIENCY TYPE: Chronic | ICD-10-CM

## 2018-02-12 DIAGNOSIS — Z12.31 ENCOUNTER FOR SCREENING MAMMOGRAM FOR MALIGNANT NEOPLASM OF BREAST: Primary | ICD-10-CM

## 2018-02-12 DIAGNOSIS — M85.88 OSTEOPENIA OF SPINE: ICD-10-CM

## 2018-02-12 LAB
25(OH)D3 SERPL-MCNC: 77.3 NG/ML (ref 30–100)
ALBUMIN SERPL-MCNC: 3.8 G/DL (ref 3.2–5.5)
ALBUMIN/GLOB SERPL: 1.2 G/DL (ref 1–3)
ALP SERPL-CCNC: 101 U/L (ref 15–121)
ALT SERPL W P-5'-P-CCNC: 12 U/L (ref 10–60)
ANION GAP SERPL CALCULATED.3IONS-SCNC: 7 MMOL/L (ref 5–15)
AST SERPL-CCNC: 20 U/L (ref 10–60)
BASOPHILS # BLD AUTO: 0.03 10*3/MM3 (ref 0–0.2)
BASOPHILS NFR BLD AUTO: 0.6 % (ref 0–2)
BILIRUB SERPL-MCNC: 0.7 MG/DL (ref 0.2–1)
BUN BLD-MCNC: 20 MG/DL (ref 8–25)
BUN/CREAT SERPL: 20 (ref 7–25)
CALCIUM SPEC-SCNC: 9.6 MG/DL (ref 8.4–10.8)
CHLORIDE SERPL-SCNC: 103 MMOL/L (ref 100–112)
CHOLEST SERPL-MCNC: 210 MG/DL (ref 150–200)
CO2 SERPL-SCNC: 29 MMOL/L (ref 20–32)
CREAT BLD-MCNC: 1 MG/DL (ref 0.4–1.3)
DEPRECATED RDW RBC AUTO: 42.9 FL (ref 36.4–46.3)
EOSINOPHIL # BLD AUTO: 0.24 10*3/MM3 (ref 0–0.7)
EOSINOPHIL NFR BLD AUTO: 4.4 % (ref 0–7)
ERYTHROCYTE [DISTWIDTH] IN BLOOD BY AUTOMATED COUNT: 13.3 % (ref 11.5–14.5)
GFR SERPL CREATININE-BSD FRML MDRD: 55 ML/MIN/1.73 (ref 39–90)
GLOBULIN UR ELPH-MCNC: 3.1 GM/DL (ref 2.5–4.6)
GLUCOSE BLD-MCNC: 87 MG/DL (ref 70–100)
HCT VFR BLD AUTO: 37.3 % (ref 35–45)
HDLC SERPL-MCNC: 71 MG/DL (ref 35–100)
HGB BLD-MCNC: 12.3 G/DL (ref 12–15.5)
LDLC SERPL CALC-MCNC: 127 MG/DL
LDLC/HDLC SERPL: 1.79 {RATIO}
LYMPHOCYTES # BLD AUTO: 1.99 10*3/MM3 (ref 0.6–4.2)
LYMPHOCYTES NFR BLD AUTO: 36.8 % (ref 10–50)
MCH RBC QN AUTO: 29.7 PG (ref 26.5–34)
MCHC RBC AUTO-ENTMCNC: 33 G/DL (ref 31.4–36)
MCV RBC AUTO: 90.1 FL (ref 80–98)
MONOCYTES # BLD AUTO: 0.54 10*3/MM3 (ref 0–0.9)
MONOCYTES NFR BLD AUTO: 10 % (ref 0–12)
NEUTROPHILS # BLD AUTO: 2.61 10*3/MM3 (ref 2–8.6)
NEUTROPHILS NFR BLD AUTO: 48.2 % (ref 37–80)
PLATELET # BLD AUTO: 273 10*3/MM3 (ref 150–450)
PMV BLD AUTO: 9.2 FL (ref 8–12)
POTASSIUM BLD-SCNC: 4.3 MMOL/L (ref 3.4–5.4)
PROT SERPL-MCNC: 6.9 G/DL (ref 6.7–8.2)
RBC # BLD AUTO: 4.14 10*6/MM3 (ref 3.77–5.16)
SODIUM BLD-SCNC: 139 MMOL/L (ref 134–146)
TRIGL SERPL-MCNC: 60 MG/DL (ref 35–160)
TSH SERPL DL<=0.05 MIU/L-ACNC: 0.43 MIU/ML (ref 0.46–4.68)
VIT B12 BLD-MCNC: 805 PG/ML (ref 239–931)
VLDLC SERPL-MCNC: 12 MG/DL
WBC NRBC COR # BLD: 5.41 10*3/MM3 (ref 3.2–9.8)

## 2018-02-12 PROCEDURE — 36415 COLL VENOUS BLD VENIPUNCTURE: CPT | Performed by: INTERNAL MEDICINE

## 2018-02-12 PROCEDURE — 80061 LIPID PANEL: CPT | Performed by: INTERNAL MEDICINE

## 2018-02-12 PROCEDURE — 84443 ASSAY THYROID STIM HORMONE: CPT | Performed by: INTERNAL MEDICINE

## 2018-02-12 PROCEDURE — 82607 VITAMIN B-12: CPT | Performed by: INTERNAL MEDICINE

## 2018-02-12 PROCEDURE — 82306 VITAMIN D 25 HYDROXY: CPT | Performed by: INTERNAL MEDICINE

## 2018-02-12 PROCEDURE — 85025 COMPLETE CBC W/AUTO DIFF WBC: CPT | Performed by: INTERNAL MEDICINE

## 2018-02-12 PROCEDURE — 80053 COMPREHEN METABOLIC PANEL: CPT | Performed by: INTERNAL MEDICINE

## 2018-02-14 ENCOUNTER — TELEPHONE (OUTPATIENT)
Dept: FAMILY MEDICINE CLINIC | Facility: CLINIC | Age: 73
End: 2018-02-14

## 2018-02-14 NOTE — TELEPHONE ENCOUNTER
----- Message from Andres Heart MD sent at 2/13/2018  2:03 PM CST -----  Notify the patient that her repeat bone density test reveals persistent osteoporosis, improved in the hip, but worse in the lumbar spine.  She has been on Prolia, but it was too expensive.  Continue the Fosamax.  Continue the calcium and vitamin D supplements.  That means no if she has trouble tolerating the Fosamax.  We will repeat an 2 years.  EB      02/14/2018 I relayed results to patient. She has tried Fosamax before and was unable to tolerate it. She has apt next week and will talk about other therapy then. TP

## 2018-02-16 NOTE — PROGRESS NOTES
"Subjective        History of Present Illness     Court Johnson is a 72 y.o. female who presents for six-month follow-up of hypertension, vitamin D deficiency, osteoporosis, anemia due to vitamin B12 deficiency, and other issues.    DEXA 03/2016 revealed osteopenia, which demonstrated considerable improvement with Prolia injections.  After her Prolia injections became unaffordable, I started her on weekly Fosamax.  She was intolerant to Boniva in the past due to unusual symptoms.  Repeat DEXA 02/2018 revealed persistent osteoporosis, improved in the hip, but worse in the lumbar spine. She was unable to tolerate the Fosamax.      Her blood pressure control is adequate today.  Her weight is stable.    The patient's relevant past medical, surgical, and social history was reviewed in Epic.   Lab results are reviewed with the patient today.  CBC unremarkable. Fasting glucose 87. Total cholesterol 210.  HDL 71.  .  Triglycerides 60.  Renal and liver function normal.  Vitamin B-12 and vitamin D at goal.      Review of Systems   Constitutional: Negative for chills, fatigue and fever.   HENT: Negative for congestion, ear pain, postnasal drip, sinus pressure and sore throat.    Respiratory: Negative for cough, shortness of breath and wheezing.    Cardiovascular: Negative for chest pain, palpitations and leg swelling.   Gastrointestinal: Negative for abdominal pain, blood in stool, constipation, diarrhea, nausea and vomiting.   Endocrine: Negative for cold intolerance, heat intolerance, polydipsia and polyuria.   Genitourinary: Negative for dysuria, frequency, hematuria and urgency.   Skin: Negative for rash.   Neurological: Negative for syncope and weakness.        Objective     /80  Pulse 80  Temp 98 °F (36.7 °C) (Oral)   Ht 149.9 cm (59\")  Wt 48.9 kg (107 lb 12.8 oz)  BMI 21.77 kg/m2    Physical Exam   Constitutional: She is oriented to person, place, and time. She appears well-developed and " well-nourished. No distress.   HENT:   Head: Normocephalic and atraumatic.   Nose: Right sinus exhibits no maxillary sinus tenderness and no frontal sinus tenderness. Left sinus exhibits no maxillary sinus tenderness and no frontal sinus tenderness.   Mouth/Throat: Uvula is midline, oropharynx is clear and moist and mucous membranes are normal. No oral lesions. No tonsillar exudate.   Eyes: Conjunctivae and EOM are normal. Pupils are equal, round, and reactive to light.   Neck: Trachea normal. Neck supple. No JVD present. Carotid bruit is not present. No tracheal deviation present. No thyroid mass and no thyromegaly present.   Cardiovascular: Normal rate, regular rhythm, normal heart sounds and intact distal pulses.   No extrasystoles are present. PMI is not displaced.    No murmur heard.  Pulmonary/Chest: Effort normal and breath sounds normal. No accessory muscle usage. No respiratory distress. She has no decreased breath sounds. She has no wheezes. She has no rhonchi. She has no rales.   Abdominal: Soft. Bowel sounds are normal. She exhibits no distension. There is no hepatosplenomegaly. There is no tenderness.       Vascular Status -  Her exam exhibits right foot vasculature normal. Her exam exhibits no right foot edema. Her exam exhibits left foot vasculature normal. Her exam exhibits no left foot edema.  Lymphadenopathy:     She has no cervical adenopathy.   Neurological: She is alert and oriented to person, place, and time. No cranial nerve deficit. Coordination normal.   Skin: Skin is warm, dry and intact. No rash noted. No cyanosis. Nails show no clubbing.   Psychiatric: She has a normal mood and affect. Her speech is normal and behavior is normal. Judgment and thought content normal.   Vitals reviewed.          Assessment/Plan      Continue with vitamin B-12 and vitamin D/calcium supplements.     Continue Lisinopril.  Pursue sodium restriction.    We will refer her to the [facility] for IV Reclast  infusion.  We will plan on these infusions annually for the next few years.  Repeat DEXA will be due in 2020.    Continue the Singulair and the albuterol rescue inhaler as needed.  She reports asthma control is at goal.    Continue other medications and vitamin and mineral supplements to treat additional medical problems which we addressed today.      She will return in six months for follow up with fasting labs one week prior.      Diagnoses and all orders for this visit:    Essential (primary) hypertension    Hypercholesterolemia    Mild intermittent asthma without complication    Vitamin D deficiency    Gastroesophageal reflux disease without esophagitis    Anemia due to vitamin B12 deficiency, unspecified B12 deficiency type    Localized osteoporosis without current pathological fracture    Other orders  -     lisinopril (PRINIVIL,ZESTRIL) 20 MG tablet; Take one tab daily      Lab on 02/12/2018   Component Date Value Ref Range Status   • Vitamin B-12 02/12/2018 805  239 - 931 pg/mL Final   • WBC 02/12/2018 5.41  3.20 - 9.80 10*3/mm3 Final   • RBC 02/12/2018 4.14  3.77 - 5.16 10*6/mm3 Final   • Hemoglobin 02/12/2018 12.3  12.0 - 15.5 g/dL Final   • Hematocrit 02/12/2018 37.3  35.0 - 45.0 % Final   • MCV 02/12/2018 90.1  80.0 - 98.0 fL Final   • MCH 02/12/2018 29.7  26.5 - 34.0 pg Final   • MCHC 02/12/2018 33.0  31.4 - 36.0 g/dL Final   • RDW 02/12/2018 13.3  11.5 - 14.5 % Final   • RDW-SD 02/12/2018 42.9  36.4 - 46.3 fl Final   • MPV 02/12/2018 9.2  8.0 - 12.0 fL Final   • Platelets 02/12/2018 273  150 - 450 10*3/mm3 Final   • Neutrophil % 02/12/2018 48.2  37.0 - 80.0 % Final   • Lymphocyte % 02/12/2018 36.8  10.0 - 50.0 % Final   • Monocyte % 02/12/2018 10.0  0.0 - 12.0 % Final   • Eosinophil % 02/12/2018 4.4  0.0 - 7.0 % Final   • Basophil % 02/12/2018 0.6  0.0 - 2.0 % Final   • Neutrophils, Absolute 02/12/2018 2.61  2.00 - 8.60 10*3/mm3 Final   • Lymphocytes, Absolute 02/12/2018 1.99  0.60 - 4.20 10*3/mm3  Final   • Monocytes, Absolute 02/12/2018 0.54  0.00 - 0.90 10*3/mm3 Final   • Eosinophils, Absolute 02/12/2018 0.24  0.00 - 0.70 10*3/mm3 Final   • Basophils, Absolute 02/12/2018 0.03  0.00 - 0.20 10*3/mm3 Final   • Glucose 02/12/2018 87  70 - 100 mg/dL Final   • BUN 02/12/2018 20  8 - 25 mg/dL Final   • Creatinine 02/12/2018 1.00  0.40 - 1.30 mg/dL Final   • Sodium 02/12/2018 139  134 - 146 mmol/L Final   • Potassium 02/12/2018 4.3  3.4 - 5.4 mmol/L Final   • Chloride 02/12/2018 103  100 - 112 mmol/L Final   • CO2 02/12/2018 29.0  20.0 - 32.0 mmol/L Final   • Calcium 02/12/2018 9.6  8.4 - 10.8 mg/dL Final   • Total Protein 02/12/2018 6.9  6.7 - 8.2 g/dL Final   • Albumin 02/12/2018 3.80  3.20 - 5.50 g/dL Final   • ALT (SGPT) 02/12/2018 12  10 - 60 U/L Final   • AST (SGOT) 02/12/2018 20  10 - 60 U/L Final   • Alkaline Phosphatase 02/12/2018 101  15 - 121 U/L Final   • Total Bilirubin 02/12/2018 0.7  0.2 - 1.0 mg/dL Final   • eGFR Non African Amer 02/12/2018 55  39 - 90 mL/min/1.73 Final   • Globulin 02/12/2018 3.1  2.5 - 4.6 gm/dL Final   • A/G Ratio 02/12/2018 1.2  1.0 - 3.0 g/dL Final   • BUN/Creatinine Ratio 02/12/2018 20.0  7.0 - 25.0 Final   • Anion Gap 02/12/2018 7.0  5.0 - 15.0 mmol/L Final   • Total Cholesterol 02/12/2018 210* 150 - 200 mg/dL Final   • Triglycerides 02/12/2018 60  35 - 160 mg/dL Final   • HDL Cholesterol 02/12/2018 71  35 - 100 mg/dL Final   • LDL Cholesterol  02/12/2018 127  mg/dL Final   • VLDL Cholesterol 02/12/2018 12  mg/dL Final   • LDL/HDL Ratio 02/12/2018 1.79   Final   • 25 Hydroxy, Vitamin D 02/12/2018 77.3  30.0 - 100.0 ng/ml Final   • TSH 02/12/2018 0.430* 0.460 - 4.680 mIU/mL Final   ]

## 2018-02-19 ENCOUNTER — OFFICE VISIT (OUTPATIENT)
Dept: FAMILY MEDICINE CLINIC | Facility: CLINIC | Age: 73
End: 2018-02-19

## 2018-02-19 VITALS
HEART RATE: 80 BPM | TEMPERATURE: 98 F | HEIGHT: 59 IN | DIASTOLIC BLOOD PRESSURE: 80 MMHG | BODY MASS INDEX: 21.73 KG/M2 | SYSTOLIC BLOOD PRESSURE: 140 MMHG | WEIGHT: 107.8 LBS

## 2018-02-19 DIAGNOSIS — E78.00 HYPERCHOLESTEROLEMIA: Chronic | ICD-10-CM

## 2018-02-19 DIAGNOSIS — K21.9 GASTROESOPHAGEAL REFLUX DISEASE WITHOUT ESOPHAGITIS: Chronic | ICD-10-CM

## 2018-02-19 DIAGNOSIS — M81.6 LOCALIZED OSTEOPOROSIS WITHOUT CURRENT PATHOLOGICAL FRACTURE: Chronic | ICD-10-CM

## 2018-02-19 DIAGNOSIS — I10 ESSENTIAL (PRIMARY) HYPERTENSION: Primary | Chronic | ICD-10-CM

## 2018-02-19 DIAGNOSIS — D51.9 ANEMIA DUE TO VITAMIN B12 DEFICIENCY, UNSPECIFIED B12 DEFICIENCY TYPE: Chronic | ICD-10-CM

## 2018-02-19 DIAGNOSIS — J45.20 MILD INTERMITTENT ASTHMA WITHOUT COMPLICATION: Chronic | ICD-10-CM

## 2018-02-19 DIAGNOSIS — M81.6 LOCALIZED OSTEOPOROSIS WITHOUT CURRENT PATHOLOGICAL FRACTURE: Primary | Chronic | ICD-10-CM

## 2018-02-19 DIAGNOSIS — E55.9 VITAMIN D DEFICIENCY: Chronic | ICD-10-CM

## 2018-02-19 PROCEDURE — 99214 OFFICE O/P EST MOD 30 MIN: CPT | Performed by: INTERNAL MEDICINE

## 2018-02-19 RX ORDER — LISINOPRIL 20 MG/1
TABLET ORAL
Qty: 90 TABLET | Refills: 3 | Status: SHIPPED | OUTPATIENT
Start: 2018-02-19 | End: 2019-03-12 | Stop reason: SDUPTHER

## 2018-02-21 RX ORDER — SODIUM CHLORIDE 9 MG/ML
250 INJECTION, SOLUTION INTRAVENOUS ONCE
Status: CANCELLED | OUTPATIENT
Start: 2018-02-21

## 2018-02-28 ENCOUNTER — INFUSION (OUTPATIENT)
Dept: ONCOLOGY | Facility: HOSPITAL | Age: 73
End: 2018-02-28

## 2018-02-28 VITALS
RESPIRATION RATE: 18 BRPM | TEMPERATURE: 97.2 F | HEART RATE: 87 BPM | DIASTOLIC BLOOD PRESSURE: 69 MMHG | SYSTOLIC BLOOD PRESSURE: 141 MMHG

## 2018-02-28 DIAGNOSIS — M81.6 LOCALIZED OSTEOPOROSIS WITHOUT CURRENT PATHOLOGICAL FRACTURE: Primary | ICD-10-CM

## 2018-02-28 PROCEDURE — 25010000002 ZOLEDRONIC ACID 5 MG/100ML SOLUTION: Performed by: INTERNAL MEDICINE

## 2018-02-28 PROCEDURE — 96365 THER/PROPH/DIAG IV INF INIT: CPT | Performed by: NURSE PRACTITIONER

## 2018-02-28 RX ORDER — ZOLEDRONIC ACID 5 MG/100ML
5 INJECTION, SOLUTION INTRAVENOUS ONCE
Status: COMPLETED | OUTPATIENT
Start: 2018-02-28 | End: 2018-02-28

## 2018-02-28 RX ORDER — SODIUM CHLORIDE 9 MG/ML
250 INJECTION, SOLUTION INTRAVENOUS ONCE
Status: CANCELLED | OUTPATIENT
Start: 2018-02-28

## 2018-02-28 RX ORDER — ZOLEDRONIC ACID 5 MG/100ML
5 INJECTION, SOLUTION INTRAVENOUS ONCE
Status: CANCELLED | OUTPATIENT
Start: 2018-02-28

## 2018-02-28 RX ORDER — SODIUM CHLORIDE 9 MG/ML
250 INJECTION, SOLUTION INTRAVENOUS ONCE
Status: COMPLETED | OUTPATIENT
Start: 2018-02-28 | End: 2018-02-28

## 2018-02-28 RX ADMIN — ZOLEDRONIC ACID 5 MG: 5 INJECTION, SOLUTION INTRAVENOUS at 12:55

## 2018-02-28 RX ADMIN — SODIUM CHLORIDE 250 ML: 9 INJECTION, SOLUTION INTRAVENOUS at 12:51

## 2018-08-14 ENCOUNTER — LAB (OUTPATIENT)
Dept: LAB | Facility: OTHER | Age: 73
End: 2018-08-14

## 2018-08-14 DIAGNOSIS — I10 ESSENTIAL (PRIMARY) HYPERTENSION: Chronic | ICD-10-CM

## 2018-08-14 DIAGNOSIS — E55.9 VITAMIN D DEFICIENCY: Chronic | ICD-10-CM

## 2018-08-14 DIAGNOSIS — D51.9 ANEMIA DUE TO VITAMIN B12 DEFICIENCY, UNSPECIFIED B12 DEFICIENCY TYPE: Chronic | ICD-10-CM

## 2018-08-14 LAB
25(OH)D3 SERPL-MCNC: 63.3 NG/ML (ref 30–100)
ALBUMIN SERPL-MCNC: 4 G/DL (ref 3.5–5)
ALBUMIN/GLOB SERPL: 1.2 G/DL (ref 1.1–1.8)
ALP SERPL-CCNC: 74 U/L (ref 38–126)
ALT SERPL W P-5'-P-CCNC: 11 U/L
ANION GAP SERPL CALCULATED.3IONS-SCNC: 6 MMOL/L (ref 5–15)
AST SERPL-CCNC: 21 U/L (ref 14–36)
BASOPHILS # BLD AUTO: 0.01 10*3/MM3 (ref 0–0.2)
BASOPHILS NFR BLD AUTO: 0.2 % (ref 0–2)
BILIRUB SERPL-MCNC: 0.5 MG/DL (ref 0.2–1.3)
BUN BLD-MCNC: 17 MG/DL (ref 7–17)
BUN/CREAT SERPL: 18.5 (ref 7–25)
CALCIUM SPEC-SCNC: 9.5 MG/DL (ref 8.4–10.2)
CHLORIDE SERPL-SCNC: 108 MMOL/L (ref 98–107)
CO2 SERPL-SCNC: 30 MMOL/L (ref 22–30)
CREAT BLD-MCNC: 0.92 MG/DL (ref 0.52–1.04)
DEPRECATED RDW RBC AUTO: 42.8 FL (ref 36.4–46.3)
EOSINOPHIL # BLD AUTO: 0.34 10*3/MM3 (ref 0–0.7)
EOSINOPHIL NFR BLD AUTO: 7.5 % (ref 0–7)
ERYTHROCYTE [DISTWIDTH] IN BLOOD BY AUTOMATED COUNT: 13.3 % (ref 11.5–14.5)
GFR SERPL CREATININE-BSD FRML MDRD: 60 ML/MIN/1.73 (ref 39–90)
GLOBULIN UR ELPH-MCNC: 3.4 GM/DL (ref 2.3–3.5)
GLUCOSE BLD-MCNC: 92 MG/DL (ref 74–99)
HCT VFR BLD AUTO: 37 % (ref 35–45)
HGB BLD-MCNC: 12 G/DL (ref 12–15.5)
LYMPHOCYTES # BLD AUTO: 1.83 10*3/MM3 (ref 0.6–4.2)
LYMPHOCYTES NFR BLD AUTO: 40.3 % (ref 10–50)
MCH RBC QN AUTO: 29.2 PG (ref 26.5–34)
MCHC RBC AUTO-ENTMCNC: 32.4 G/DL (ref 31.4–36)
MCV RBC AUTO: 90 FL (ref 80–98)
MONOCYTES # BLD AUTO: 0.42 10*3/MM3 (ref 0–0.9)
MONOCYTES NFR BLD AUTO: 9.3 % (ref 0–12)
NEUTROPHILS # BLD AUTO: 1.94 10*3/MM3 (ref 2–8.6)
NEUTROPHILS NFR BLD AUTO: 42.7 % (ref 37–80)
PLATELET # BLD AUTO: 238 10*3/MM3 (ref 150–450)
PMV BLD AUTO: 9.9 FL (ref 8–12)
POTASSIUM BLD-SCNC: 4.6 MMOL/L (ref 3.4–5)
PROT SERPL-MCNC: 7.4 G/DL (ref 6.3–8.2)
RBC # BLD AUTO: 4.11 10*6/MM3 (ref 3.77–5.16)
SODIUM BLD-SCNC: 144 MMOL/L (ref 137–145)
VIT B12 BLD-MCNC: 707 PG/ML (ref 239–931)
WBC NRBC COR # BLD: 4.54 10*3/MM3 (ref 3.2–9.8)

## 2018-08-14 PROCEDURE — 80053 COMPREHEN METABOLIC PANEL: CPT | Performed by: INTERNAL MEDICINE

## 2018-08-14 PROCEDURE — 82607 VITAMIN B-12: CPT | Performed by: INTERNAL MEDICINE

## 2018-08-14 PROCEDURE — 82306 VITAMIN D 25 HYDROXY: CPT | Performed by: INTERNAL MEDICINE

## 2018-08-14 PROCEDURE — 36415 COLL VENOUS BLD VENIPUNCTURE: CPT | Performed by: INTERNAL MEDICINE

## 2018-08-14 PROCEDURE — 85025 COMPLETE CBC W/AUTO DIFF WBC: CPT | Performed by: INTERNAL MEDICINE

## 2018-08-21 ENCOUNTER — OFFICE VISIT (OUTPATIENT)
Dept: FAMILY MEDICINE CLINIC | Facility: CLINIC | Age: 73
End: 2018-08-21

## 2018-08-21 VITALS
BODY MASS INDEX: 20.13 KG/M2 | OXYGEN SATURATION: 97 % | TEMPERATURE: 98 F | SYSTOLIC BLOOD PRESSURE: 140 MMHG | DIASTOLIC BLOOD PRESSURE: 60 MMHG | HEIGHT: 62 IN | HEART RATE: 80 BPM | WEIGHT: 109.4 LBS

## 2018-08-21 DIAGNOSIS — J30.1 CHRONIC SEASONAL ALLERGIC RHINITIS DUE TO POLLEN: Chronic | ICD-10-CM

## 2018-08-21 DIAGNOSIS — J45.20 MILD INTERMITTENT ASTHMA WITHOUT COMPLICATION: Chronic | ICD-10-CM

## 2018-08-21 DIAGNOSIS — R13.12 OROPHARYNGEAL DYSPHAGIA: ICD-10-CM

## 2018-08-21 DIAGNOSIS — R49.9 CHANGE IN VOICE: ICD-10-CM

## 2018-08-21 DIAGNOSIS — K21.9 GASTROESOPHAGEAL REFLUX DISEASE WITHOUT ESOPHAGITIS: Chronic | ICD-10-CM

## 2018-08-21 DIAGNOSIS — I34.0 MITRAL VALVE INSUFFICIENCY, UNSPECIFIED ETIOLOGY: Chronic | ICD-10-CM

## 2018-08-21 DIAGNOSIS — E78.00 HYPERCHOLESTEROLEMIA: Chronic | ICD-10-CM

## 2018-08-21 DIAGNOSIS — J43.2 CENTRILOBULAR EMPHYSEMA (HCC): Chronic | ICD-10-CM

## 2018-08-21 DIAGNOSIS — Z23 NEED FOR PROPHYLACTIC VACCINATION AGAINST STREPTOCOCCUS PNEUMONIAE (PNEUMOCOCCUS): ICD-10-CM

## 2018-08-21 DIAGNOSIS — E55.9 VITAMIN D DEFICIENCY: Chronic | ICD-10-CM

## 2018-08-21 DIAGNOSIS — D51.9 ANEMIA DUE TO VITAMIN B12 DEFICIENCY, UNSPECIFIED B12 DEFICIENCY TYPE: Chronic | ICD-10-CM

## 2018-08-21 DIAGNOSIS — I10 ESSENTIAL (PRIMARY) HYPERTENSION: Primary | Chronic | ICD-10-CM

## 2018-08-21 DIAGNOSIS — M81.6 LOCALIZED OSTEOPOROSIS WITHOUT CURRENT PATHOLOGICAL FRACTURE: Chronic | ICD-10-CM

## 2018-08-21 DIAGNOSIS — R09.82 POST-NASAL DRIP: ICD-10-CM

## 2018-08-21 PROCEDURE — 99214 OFFICE O/P EST MOD 30 MIN: CPT | Performed by: INTERNAL MEDICINE

## 2018-08-21 PROCEDURE — 90670 PCV13 VACCINE IM: CPT | Performed by: INTERNAL MEDICINE

## 2018-08-21 PROCEDURE — G0009 ADMIN PNEUMOCOCCAL VACCINE: HCPCS | Performed by: INTERNAL MEDICINE

## 2018-08-21 RX ORDER — MONTELUKAST SODIUM 10 MG/1
11 TABLET ORAL NIGHTLY
Qty: 30 TABLET | Refills: 0 | Status: SHIPPED | OUTPATIENT
Start: 2018-08-21 | End: 2022-04-05

## 2018-08-21 RX ORDER — FLUTICASONE PROPIONATE 50 MCG
1 SPRAY, SUSPENSION (ML) NASAL 2 TIMES DAILY
Qty: 1 BOTTLE | Refills: 11 | Status: SHIPPED | OUTPATIENT
Start: 2018-08-21 | End: 2018-10-05 | Stop reason: SDUPTHER

## 2018-08-21 NOTE — PROGRESS NOTES
Subjective        History of Present Illness     Court Johnson is a 73 y.o. female six-month follow-up of hypertension, vitamin D deficiency, osteoporosis, anemia due to vitamin B12 deficiency, and other issues.  She denies chest pain.      She describes symptoms of oropharyngeal dysphagia, as she reports choking on solids and voice change with her voice appearing to be a higher pitch than her normal.  She is nor currently taking her Singulair or using her Flonase nasal spray. She also reports a nonproductive cough and wheezing.  I recommended referral to ENT and patient is in agreement.  We will refer to Dr. Acosta Damon.  We will have her resume the Singulair and Flonase as well.      She has some skin lesions on her face consistent with actinic keratoses.  I recommended she schedule an appointment for her to have cryotherapy destruction.     Repeat DEXA 02/2018 revealed persistent osteoporosis, improved in the hip, but worse in the lumbar spine. She was unable to tolerate the Fosamax. We referred her to the [facility] for IV Reclast infusion.  She had her last infusion in February 2018 and is scheduled for her next infusion 03/2019. We will plan on these infusions annually for the next few years.  Repeat DEXA will be due in 2020.  She continues on calcium supplements.  She is no longer taking oral vitamin D.  However, vitamin D level remains at goal.      She is due for pneumonia vaccines.  She is given Prevnar today and we will plan on giving Pneumovax in one year.       Weight is up 2 pounds in the past six months.  Blood pressure at goal.     The patient's relevant past medical, surgical, and social history was reviewed in Epic.   Lab results are reviewed with the patient today.  CBC and CMP unremarkable.  Vitamin B-12 at goal with oral supplements.  Renal and liver function normal.       Review of Systems   Constitutional: Negative for chills, fatigue and fever.   HENT: Negative for congestion, ear  "pain, postnasal drip, sinus pressure and sore throat.    Respiratory: Negative for cough, shortness of breath and wheezing.    Cardiovascular: Negative for chest pain, palpitations and leg swelling.   Gastrointestinal: Negative for abdominal pain, blood in stool, constipation, diarrhea, nausea and vomiting.   Endocrine: Negative for cold intolerance, heat intolerance, polydipsia and polyuria.   Genitourinary: Negative for dysuria, frequency, hematuria and urgency.   Skin: Negative for rash.   Neurological: Negative for syncope and weakness.        Objective     Vitals:    08/21/18 1005   BP: 140/60   Pulse: 80   Temp: 98 °F (36.7 °C)   TempSrc: Oral   SpO2: 97%   Weight: 49.6 kg (109 lb 6.4 oz)   Height: 157.5 cm (62\")     Physical Exam   Constitutional: She is oriented to person, place, and time. She appears well-developed and well-nourished. No distress.   Very pleasant female.    HENT:   Head: Normocephalic and atraumatic.   Nose: Right sinus exhibits no maxillary sinus tenderness and no frontal sinus tenderness. Left sinus exhibits no maxillary sinus tenderness and no frontal sinus tenderness.   Mouth/Throat: Uvula is midline, oropharynx is clear and moist and mucous membranes are normal. No oral lesions. No tonsillar exudate.   Mild clear postnaasal drip     Eyes: Pupils are equal, round, and reactive to light. Conjunctivae and EOM are normal.   Neck: Trachea normal. Neck supple. No JVD present. Carotid bruit is not present. No tracheal deviation present. No thyroid mass and no thyromegaly present.   Cardiovascular: Normal rate, regular rhythm and intact distal pulses.   No extrasystoles are present. PMI is not displaced.    Murmur heard.  2/6 systolic murmur heard best at left sternal border.     Pulmonary/Chest: Effort normal and breath sounds normal. No accessory muscle usage. No respiratory distress. She has no decreased breath sounds. She has no wheezes. She has no rhonchi. She has no rales.   A few " chronic lung sounds.    Abdominal: Soft. Bowel sounds are normal. She exhibits no distension. There is no hepatosplenomegaly. There is no tenderness.   Musculoskeletal:   Kyphotic curvature of the spine.          Vascular Status -  Her right foot exhibits normal foot vasculature  and no edema. Her left foot exhibits normal foot vasculature  and no edema.  Lymphadenopathy:     She has no cervical adenopathy.   Neurological: She is alert and oriented to person, place, and time. No cranial nerve deficit. Coordination normal.   Skin: Skin is warm, dry and intact. No rash noted. No cyanosis. Nails show no clubbing.   Psychiatric: She has a normal mood and affect. Her speech is normal and behavior is normal. Judgment and thought content normal.   Vitals reviewed.      Assessment/Plan      Recommended she restart the Singulair 10 mg one q.d. and Flonase nasal spray one spray each nostril b.i.d.  We will refer to ENT, Dr. Acosta Damon    She is given Prevnar today and we will plan to give Pneumovax in one year.     I recommended she schedule an appointment for cryotherapy destruction of scattered actinic keratoses on the face.      Continue with vitamin B-12 and calcium supplements.  Repeat DEXA will be due in 2020.  Her next IV infusion is scheduled for March 1st, 2019.       Continue Lisinopril.  Pursue sodium restriction.    Return in six months for follow up with fasting labs one week prior.      Scribed for Dr. Heart by Olesya Bruno [facility].     Diagnoses and all orders for this visit:    Essential (primary) hypertension  -     CBC Auto Differential; Future  -     Comprehensive Metabolic Panel; Future    Hypercholesterolemia  -     Lipid Panel; Future  -     TSH; Future    Mitral valve insufficiency, unspecified etiology    Mild intermittent asthma without complication    Centrilobular emphysema (CMS/HCC)    Gastroesophageal reflux disease without esophagitis    Vitamin D deficiency  -     Vitamin D 25 Hydroxy;  Future    Anemia due to vitamin B12 deficiency, unspecified B12 deficiency type    Localized osteoporosis without current pathological fracture    Chronic seasonal allergic rhinitis due to pollen  -     Ambulatory Referral to ENT (Otolaryngology)    Post-nasal drip  -     Ambulatory Referral to ENT (Otolaryngology)    Oropharyngeal dysphagia  -     Ambulatory Referral to ENT (Otolaryngology)    Change in voice  -     Ambulatory Referral to ENT (Otolaryngology)    Need for prophylactic vaccination against Streptococcus pneumoniae (pneumococcus)  -     Pneumococcal Conjugate Vaccine 13-Valent All    Other orders  -     fluticasone (FLONASE) 50 MCG/ACT nasal spray; 1 spray into the nostril(s) as directed by provider 2 (Two) Times a Day.  -     montelukast (SINGULAIR) 10 MG tablet; Take 1 tablet by mouth Every Night.        Lab on 08/14/2018   Component Date Value Ref Range Status   • Vitamin B-12 08/14/2018 707  239 - 931 pg/mL Final   • WBC 08/14/2018 4.54  3.20 - 9.80 10*3/mm3 Final   • RBC 08/14/2018 4.11  3.77 - 5.16 10*6/mm3 Final   • Hemoglobin 08/14/2018 12.0  12.0 - 15.5 g/dL Final   • Hematocrit 08/14/2018 37.0  35.0 - 45.0 % Final   • MCV 08/14/2018 90.0  80.0 - 98.0 fL Final   • MCH 08/14/2018 29.2  26.5 - 34.0 pg Final   • MCHC 08/14/2018 32.4  31.4 - 36.0 g/dL Final   • RDW 08/14/2018 13.3  11.5 - 14.5 % Final   • RDW-SD 08/14/2018 42.8  36.4 - 46.3 fl Final   • MPV 08/14/2018 9.9  8.0 - 12.0 fL Final   • Platelets 08/14/2018 238  150 - 450 10*3/mm3 Final   • Neutrophil % 08/14/2018 42.7  37.0 - 80.0 % Final   • Lymphocyte % 08/14/2018 40.3  10.0 - 50.0 % Final   • Monocyte % 08/14/2018 9.3  0.0 - 12.0 % Final   • Eosinophil % 08/14/2018 7.5* 0.0 - 7.0 % Final   • Basophil % 08/14/2018 0.2  0.0 - 2.0 % Final   • Neutrophils, Absolute 08/14/2018 1.94* 2.00 - 8.60 10*3/mm3 Final   • Lymphocytes, Absolute 08/14/2018 1.83  0.60 - 4.20 10*3/mm3 Final   • Monocytes, Absolute 08/14/2018 0.42  0.00 - 0.90 10*3/mm3  Final   • Eosinophils, Absolute 08/14/2018 0.34  0.00 - 0.70 10*3/mm3 Final   • Basophils, Absolute 08/14/2018 0.01  0.00 - 0.20 10*3/mm3 Final   • Glucose 08/14/2018 92  74 - 99 mg/dL Final   • BUN 08/14/2018 17  7 - 17 mg/dL Final   • Creatinine 08/14/2018 0.92  0.52 - 1.04 mg/dL Final   • Sodium 08/14/2018 144  137 - 145 mmol/L Final   • Potassium 08/14/2018 4.6  3.4 - 5.0 mmol/L Final   • Chloride 08/14/2018 108* 98 - 107 mmol/L Final   • CO2 08/14/2018 30.0  22.0 - 30.0 mmol/L Final   • Calcium 08/14/2018 9.5  8.4 - 10.2 mg/dL Final   • Total Protein 08/14/2018 7.4  6.3 - 8.2 g/dL Final   • Albumin 08/14/2018 4.00  3.50 - 5.00 g/dL Final   • ALT (SGPT) 08/14/2018 11  <=35 U/L Final   • AST (SGOT) 08/14/2018 21  14 - 36 U/L Final   • Alkaline Phosphatase 08/14/2018 74  38 - 126 U/L Final   • Total Bilirubin 08/14/2018 0.5  0.2 - 1.3 mg/dL Final   • eGFR Non  Amer 08/14/2018 60  39 - 90 mL/min/1.73 Final   • Globulin 08/14/2018 3.4  2.3 - 3.5 gm/dL Final   • A/G Ratio 08/14/2018 1.2  1.1 - 1.8 g/dL Final   • BUN/Creatinine Ratio 08/14/2018 18.5  7.0 - 25.0 Final   • Anion Gap 08/14/2018 6.0  5.0 - 15.0 mmol/L Final   • 25 Hydroxy, Vitamin D 08/14/2018 63.3  30.0 - 100.0 ng/ml Final   ]

## 2018-09-07 ENCOUNTER — OFFICE VISIT (OUTPATIENT)
Dept: FAMILY MEDICINE CLINIC | Facility: CLINIC | Age: 73
End: 2018-09-07

## 2018-09-07 VITALS
TEMPERATURE: 98 F | WEIGHT: 110 LBS | DIASTOLIC BLOOD PRESSURE: 70 MMHG | SYSTOLIC BLOOD PRESSURE: 146 MMHG | HEIGHT: 62 IN | HEART RATE: 72 BPM | BODY MASS INDEX: 20.24 KG/M2

## 2018-09-07 DIAGNOSIS — L91.8 SKIN TAG: ICD-10-CM

## 2018-09-07 DIAGNOSIS — S41.111A SKIN TEAR OF RIGHT UPPER EXTREMITY: ICD-10-CM

## 2018-09-07 DIAGNOSIS — L82.0 INFLAMED SEBORRHEIC KERATOSIS: ICD-10-CM

## 2018-09-07 DIAGNOSIS — L57.0 AK (ACTINIC KERATOSIS): Primary | ICD-10-CM

## 2018-09-07 PROCEDURE — 17003 DESTRUCT PREMALG LES 2-14: CPT | Performed by: INTERNAL MEDICINE

## 2018-09-07 PROCEDURE — 99212 OFFICE O/P EST SF 10 MIN: CPT | Performed by: INTERNAL MEDICINE

## 2018-09-07 PROCEDURE — 17110 DESTRUCTION B9 LES UP TO 14: CPT | Performed by: INTERNAL MEDICINE

## 2018-09-07 PROCEDURE — 17000 DESTRUCT PREMALG LESION: CPT | Performed by: INTERNAL MEDICINE

## 2018-09-07 NOTE — PROGRESS NOTES
"Subjective     History of Present Illness     Court Johnson is a 73 y.o. female with sun-damaged skin who comes in for cryotherapy of some skin lesions on her face and bilateral arms. Cryotherapy is used to destroy a total of 6 skin lesions including 1 AK on each forearm,1 small skin tag and 1 inflammed SK on right temple region, 1 inflammed keratinized SK on left temple area, and 1 AK on the left temple.  She tolerated well.      She sustained a skin tear on her right arm yesterday.  She has it covered with a Band-Aid.  She had some significant burning with peroxide.   I recommended she avoid  use of the peroxide and apply topical antibiotic ointment and monitor for signs of infection.        Review of Systems   Constitutional: Negative for chills, fatigue and fever.   HENT: Negative for congestion, ear pain, postnasal drip, sinus pressure and sore throat.    Respiratory: Negative for cough, shortness of breath and wheezing.    Cardiovascular: Negative for chest pain, palpitations and leg swelling.   Gastrointestinal: Negative for abdominal pain, blood in stool, constipation, diarrhea, nausea and vomiting.   Endocrine: Negative for cold intolerance, heat intolerance, polydipsia and polyuria.   Genitourinary: Negative for dysuria, frequency, hematuria and urgency.   Skin: Negative for rash.        Skin lesions   Neurological: Negative for syncope and weakness.        Objective     Vitals:    09/07/18 1033   BP: 146/70   Pulse: 72   Temp: 98 °F (36.7 °C)   TempSrc: Oral   Weight: 49.9 kg (110 lb)   Height: 157.5 cm (62\")     Physical Exam   Constitutional: She is oriented to person, place, and time. She appears well-developed and well-nourished. No distress.   Very pleasant female.     HENT:   Head: Normocephalic and atraumatic.   Nose: Nose normal.   Mouth/Throat: Oropharynx is clear and moist. No oropharyngeal exudate.   Eyes: Pupils are equal, round, and reactive to light. EOM are normal.   Neck: Neck " supple. No JVD present. No thyromegaly present.   Cardiovascular: Normal rate, regular rhythm and normal heart sounds.    2/6 systolic murmur heard best at left sternal border.      Pulmonary/Chest: Effort normal and breath sounds normal. No accessory muscle usage. No respiratory distress. She has no wheezes. She has no rales.   Chronic lung sounds.    Abdominal: Soft. Bowel sounds are normal. She exhibits no distension. There is no tenderness.   Musculoskeletal: She exhibits no edema.   Kyphotic curvature of the spine.   Lymphadenopathy:     She has no cervical adenopathy.   Neurological: She is alert and oriented to person, place, and time. No cranial nerve deficit.   Skin:   1 AK on each forearm,1  skin tag and 1 inflammed SK on right temple region, 1 inflammed keratinized SK on left temple area, and 1 AK on the left temple.   Psychiatric: She has a normal mood and affect. Her speech is normal and behavior is normal. Judgment and thought content normal.     Future Appointments  Date Time Provider Department Center   10/5/2018 9:30 AM Acosta Damon MD [facility] None   2/25/2019 10:30 AM Andres Simpson MD [facility] None   3/1/2019 1:30 PM [facility] OP INFU BED 1 [facility] OPI [facility]         Assessment/Plan      Cryotherapy, Skin Lesion  Date/Time: 9/7/2018 11:10 AM  Performed by: ANDRES SIMPSON  Authorized by: ANDRES SIMPSON   Local anesthesia used: no    Anesthesia:  Local anesthesia used: no    Sedation:  Patient sedated: no  Patient tolerance: Patient tolerated the procedure well with no immediate complications  Comments: Cryotherapy is used to address the skin tag, AK's and SKs described above (a total of six skin lesions as described above) without difficulty or complications.  She tolerated well.        She can apply antibiotic ointment to the skin tear on her right arm.  Monitor for sign of infection.  Wound care instructions for the skin tear were reviewed with the patient    Return next February for  routine follow up with fasting labs one week prior.     Scribed for Dr. Heart by Olesya Bruno [facility].     Diagnoses and all orders for this visit:    AK (actinic keratosis)    Inflamed seborrheic keratosis    Skin tear of right upper extremity    Other orders  -     Cryotherapy, Skin Lesion        No visits with results within 3 Week(s) from this visit.   Latest known visit with results is:   Lab on 08/14/2018   Component Date Value Ref Range Status   • Vitamin B-12 08/14/2018 707  239 - 931 pg/mL Final   • WBC 08/14/2018 4.54  3.20 - 9.80 10*3/mm3 Final   • RBC 08/14/2018 4.11  3.77 - 5.16 10*6/mm3 Final   • Hemoglobin 08/14/2018 12.0  12.0 - 15.5 g/dL Final   • Hematocrit 08/14/2018 37.0  35.0 - 45.0 % Final   • MCV 08/14/2018 90.0  80.0 - 98.0 fL Final   • MCH 08/14/2018 29.2  26.5 - 34.0 pg Final   • MCHC 08/14/2018 32.4  31.4 - 36.0 g/dL Final   • RDW 08/14/2018 13.3  11.5 - 14.5 % Final   • RDW-SD 08/14/2018 42.8  36.4 - 46.3 fl Final   • MPV 08/14/2018 9.9  8.0 - 12.0 fL Final   • Platelets 08/14/2018 238  150 - 450 10*3/mm3 Final   • Neutrophil % 08/14/2018 42.7  37.0 - 80.0 % Final   • Lymphocyte % 08/14/2018 40.3  10.0 - 50.0 % Final   • Monocyte % 08/14/2018 9.3  0.0 - 12.0 % Final   • Eosinophil % 08/14/2018 7.5* 0.0 - 7.0 % Final   • Basophil % 08/14/2018 0.2  0.0 - 2.0 % Final   • Neutrophils, Absolute 08/14/2018 1.94* 2.00 - 8.60 10*3/mm3 Final   • Lymphocytes, Absolute 08/14/2018 1.83  0.60 - 4.20 10*3/mm3 Final   • Monocytes, Absolute 08/14/2018 0.42  0.00 - 0.90 10*3/mm3 Final   • Eosinophils, Absolute 08/14/2018 0.34  0.00 - 0.70 10*3/mm3 Final   • Basophils, Absolute 08/14/2018 0.01  0.00 - 0.20 10*3/mm3 Final   • Glucose 08/14/2018 92  74 - 99 mg/dL Final   • BUN 08/14/2018 17  7 - 17 mg/dL Final   • Creatinine 08/14/2018 0.92  0.52 - 1.04 mg/dL Final   • Sodium 08/14/2018 144  137 - 145 mmol/L Final   • Potassium 08/14/2018 4.6  3.4 - 5.0 mmol/L Final   • Chloride 08/14/2018 108* 98 - 107  mmol/L Final   • CO2 08/14/2018 30.0  22.0 - 30.0 mmol/L Final   • Calcium 08/14/2018 9.5  8.4 - 10.2 mg/dL Final   • Total Protein 08/14/2018 7.4  6.3 - 8.2 g/dL Final   • Albumin 08/14/2018 4.00  3.50 - 5.00 g/dL Final   • ALT (SGPT) 08/14/2018 11  <=35 U/L Final   • AST (SGOT) 08/14/2018 21  14 - 36 U/L Final   • Alkaline Phosphatase 08/14/2018 74  38 - 126 U/L Final   • Total Bilirubin 08/14/2018 0.5  0.2 - 1.3 mg/dL Final   • eGFR Non  Amer 08/14/2018 60  39 - 90 mL/min/1.73 Final   • Globulin 08/14/2018 3.4  2.3 - 3.5 gm/dL Final   • A/G Ratio 08/14/2018 1.2  1.1 - 1.8 g/dL Final   • BUN/Creatinine Ratio 08/14/2018 18.5  7.0 - 25.0 Final   • Anion Gap 08/14/2018 6.0  5.0 - 15.0 mmol/L Final   • 25 Hydroxy, Vitamin D 08/14/2018 63.3  30.0 - 100.0 ng/ml Final   ]

## 2018-10-05 ENCOUNTER — OFFICE VISIT (OUTPATIENT)
Dept: OTOLARYNGOLOGY | Facility: CLINIC | Age: 73
End: 2018-10-05

## 2018-10-05 VITALS — HEIGHT: 62 IN | TEMPERATURE: 97.2 F | BODY MASS INDEX: 19.88 KG/M2 | WEIGHT: 108 LBS

## 2018-10-05 DIAGNOSIS — J31.0 CHRONIC RHINITIS: ICD-10-CM

## 2018-10-05 DIAGNOSIS — R09.89 GLOBUS SENSATION: ICD-10-CM

## 2018-10-05 DIAGNOSIS — J37.0 CHRONIC LARYNGITIS: ICD-10-CM

## 2018-10-05 DIAGNOSIS — J04.0 ACUTE LARYNGITIS: Primary | ICD-10-CM

## 2018-10-05 PROCEDURE — 99203 OFFICE O/P NEW LOW 30 MIN: CPT | Performed by: OTOLARYNGOLOGY

## 2018-10-05 PROCEDURE — 31575 DIAGNOSTIC LARYNGOSCOPY: CPT | Performed by: OTOLARYNGOLOGY

## 2018-10-05 RX ORDER — RANITIDINE 300 MG/1
300 TABLET ORAL NIGHTLY
Qty: 30 TABLET | Refills: 11 | Status: SHIPPED | OUTPATIENT
Start: 2018-10-05 | End: 2020-09-08

## 2018-10-05 RX ORDER — FLUTICASONE PROPIONATE 50 MCG
1 SPRAY, SUSPENSION (ML) NASAL 2 TIMES DAILY
Qty: 1 BOTTLE | Refills: 11 | Status: SHIPPED | OUTPATIENT
Start: 2018-10-05 | End: 2020-03-02 | Stop reason: SDUPTHER

## 2018-10-05 NOTE — PROGRESS NOTES
Subjective   Court Johnson is a 73 y.o. female.   This is a consultation from Dr. ware  History of Present Illness   Patient reports that for years she has felt a sensation of something in her throat.  Clears her throat frequently.  Says she feels like she has something in there that she can't swallow but can't expectorate.  Nothing in particular brought this on that she can recall.  She has been prescribed Flonase and ranitidine but states she doesn't take them regularly and can't remember the last time she used them.  Says that sometimes it feels like food wants to hang in her throat although has never been so bad she's had to expectorate food.  No hemoptysis.  Became acutely hoarse on Monday with no obvious inciting factors.  This is beginning to improve.  She has some mild postnasal drainage and nonpurulent rhinorrhea on a chronic basis.      The following portions of the patient's history were reviewed and updated as appropriate: allergies, current medications, past family history, past medical history, past social history, past surgical history and problem list.      Court Johnson reports that she has never smoked. She has never used smokeless tobacco. She reports that she does not drink alcohol or use drugs.  Patient is not a tobacco user and has not been counseled for use of tobacco products    Family History   Problem Relation Age of Onset   • Diabetes Brother    • Cancer Other         aunt   • Breast cancer Paternal Aunt        Allergies   Allergen Reactions   • Boniva [Ibandronic Acid] Other (See Comments)     Felt paralyzed   • Fosamax [Alendronate] Swelling     Joint pain in bilateral wrists         Current Outpatient Prescriptions:   •  albuterol (PROAIR HFA) 108 (90 Base) MCG/ACT inhaler, Inhale 2 puffs 4 (Four) Times a Day As Needed for Shortness of Air (cough). 90 mcg/actuation, Disp: 1 inhaler, Rfl: 2  •  Calcium Carb-Cholecalciferol (CALCIUM CARBONATE-VITAMIN D3) 600-400 MG-UNIT  tablet, Take 2 tablets by mouth daily., Disp: , Rfl:   •  fluticasone (FLONASE) 50 MCG/ACT nasal spray, 1 spray into the nostril(s) as directed by provider 2 (Two) Times a Day., Disp: 1 bottle, Rfl: 11  •  lisinopril (PRINIVIL,ZESTRIL) 20 MG tablet, Take one tab daily, Disp: 90 tablet, Rfl: 3  •  meloxicam (MOBIC) 15 MG tablet, Take 1 tablet by mouth Daily As Needed (pain). Take with food, Disp: 15 tablet, Rfl: 0  •  montelukast (SINGULAIR) 10 MG tablet, Take 1 tablet by mouth Every Night., Disp: 30 tablet, Rfl: 0  •  raNITIdine (ZANTAC) 300 MG tablet, Take 1 tablet by mouth Every Night., Disp: 30 tablet, Rfl: 11  •  vitamin B-12 (CYANOCOBALAMIN) 500 MCG tablet, Take 500 mcg by mouth 3 (three) times a week., Disp: , Rfl:     Past Medical History:   Diagnosis Date   • Allergic rhinitis, seasonal    • Aptyalism     Mild chronic dry mouth      • Cervical disc disorder     Right radiculopathy. Sxs improved.      • Chronic obstructive lung disease (CMS/HCC)     Mild. Long history of secondhand smoke exposure      • Closed fracture of ankle 08/2015   • Degenerative joint disease involving multiple joints    • Dupuytren's disease     of palm and finger, with contracture - Bilateral      • Essential (primary) hypertension     Started lisinopril, 2/2016      • GERD (gastroesophageal reflux disease)    • Glossitis     Sensitive, mildly painful tongue and gums and mucosa      • Hypercholesterolemia     Good ratio      • Hyperthyroidism     h/o possible 12/2012. TFT's wnl by 2/2013 and afterward   • Impaired fasting glucose     h/o      • Mild intermittent asthma    • Mitral valve regurgitation     Moderate per echo 12/2012      • Osteoarthritis of multiple joints     Hands and low back      • Osteoporosis     Hip and lumbar      • Pneumonia     h/o 12/2012. Resolved.      • Tricuspid valve incompetence, non-rheumatic     Mild/moderate      • Venous varices     Painful      • Vitamin B12 deficiency anemia     On oral B12   •  Vitamin D deficiency          Review of Systems   Constitutional: Negative for fever.   HENT: Positive for voice change.    Respiratory: Negative for shortness of breath.    All other systems reviewed and are negative.          Objective   Physical Exam  General: Well-developed well-nourished female in no acute distress.  Alert and oriented ×3. Head: Normocephalic. Face: Symmetrical strength and appearance. PERRL. EOMI. Voice: Harsh with cracks and breaks but no stridor. Speech:Fluent  Ears: External ears no deformity, canals no discharge, tympanic membranes intact clear and mobile bilaterally.  Nose: Nares show no discharge mass polyp or purulence.  Boggy mucosa is present.  No gross external deformity.  Septum: Midline  Oral cavity: Lips and gums without lesions.  Tongue and floor of mouth without lesions.  Parotid and submandibular ducts unobstructed.  No mucosal lesions on the buccal mucosa or vestibule of the mouth.  Pharynx: No erythema exudate mass or ulcer  Neck: No lymphadenopathy.  No thyromegaly.  Trachea and larynx midline.  No masses in the parotid or submandibular glands.    Procedure Note    Pre-operative Diagnosis:   Chief Complaint   Patient presents with   • Allergic Rhinitis   Globus sensation; dysphonia    Post-operative Diagnosis: Acute laryngitis superimposed on chronic laryngitis    Anesthesia: topical with xylocaine and neosynephrine    Endoscopy Type:  Flexible Laryngoscopy    Procedure Details:    The patient was placed in the sitting position.  After topical anesthesia and decongestion, the 4 mm laryngoscope was passed.  The nasal cavities, nasopharynx, oropharynx, hypopharynx, and larynx were all examined.  Vocal cords were examined during respiration and phonation.  The following findings were noted:    Findings: No masses in the nose, nasopharynx, hypopharynx.  Endolarynx shows chronic appearing edema and acute appearing erythema of the vocal cords themselves.  Vocal cord mobility is  intact and there is no evidence of neoplasm.  This is consistent with an acute superimposed on chronic laryngitis.    Condition:  Stable.  Patient tolerated procedure well.    Complications:  None        Assessment/Plan   Court was seen today for allergic rhinitis.    Diagnoses and all orders for this visit:    Acute laryngitis    Chronic laryngitis    Globus sensation    Chronic rhinitis    Other orders  -     fluticasone (FLONASE) 50 MCG/ACT nasal spray; 1 spray into the nostril(s) as directed by provider 2 (Two) Times a Day.  -     raNITIdine (ZANTAC) 300 MG tablet; Take 1 tablet by mouth Every Night.      Plan: Explained to the patient that her globus sensation was most likely caused by a combination of postnasal drainage and acid reflux.  I will send a new prescriptions for Flonase and increase her dosage of ranitidine to 300 mg daily at bedtime.  I advised her to use these regularly for 1 month.  If her symptoms are no better at all I would like her to discuss with Dr. ware possibly changing her lisinopril to a non-Ace medication as this can sometimes be a source of globus sensation.  I will see her again at Dr. ware's discretion.    My thanks Dr. ware for this consultation

## 2019-02-18 ENCOUNTER — LAB (OUTPATIENT)
Dept: LAB | Facility: OTHER | Age: 74
End: 2019-02-18

## 2019-02-18 DIAGNOSIS — E78.00 HYPERCHOLESTEROLEMIA: Chronic | ICD-10-CM

## 2019-02-18 DIAGNOSIS — E55.9 VITAMIN D DEFICIENCY: Chronic | ICD-10-CM

## 2019-02-18 DIAGNOSIS — I10 ESSENTIAL (PRIMARY) HYPERTENSION: Chronic | ICD-10-CM

## 2019-02-18 LAB
25(OH)D3 SERPL-MCNC: 56.7 NG/ML (ref 30–100)
ALBUMIN SERPL-MCNC: 4.1 G/DL (ref 3.5–5)
ALBUMIN/GLOB SERPL: 1.2 G/DL (ref 1.1–1.8)
ALP SERPL-CCNC: 92 U/L (ref 38–126)
ALT SERPL W P-5'-P-CCNC: 15 U/L
ANION GAP SERPL CALCULATED.3IONS-SCNC: 5 MMOL/L (ref 5–15)
AST SERPL-CCNC: 20 U/L (ref 14–36)
BASOPHILS # BLD AUTO: 0.03 10*3/MM3 (ref 0–0.2)
BASOPHILS NFR BLD AUTO: 0.6 % (ref 0–2)
BILIRUB SERPL-MCNC: 0.4 MG/DL (ref 0.2–1.3)
BUN BLD-MCNC: 14 MG/DL (ref 7–17)
BUN/CREAT SERPL: 13.7 (ref 7–25)
CALCIUM SPEC-SCNC: 9.6 MG/DL (ref 8.4–10.2)
CHLORIDE SERPL-SCNC: 106 MMOL/L (ref 98–107)
CHOLEST SERPL-MCNC: 172 MG/DL (ref 150–200)
CO2 SERPL-SCNC: 31 MMOL/L (ref 22–30)
CREAT BLD-MCNC: 1.02 MG/DL (ref 0.52–1.04)
DEPRECATED RDW RBC AUTO: 41.4 FL (ref 36.4–46.3)
EOSINOPHIL # BLD AUTO: 0.41 10*3/MM3 (ref 0–0.7)
EOSINOPHIL NFR BLD AUTO: 7.6 % (ref 0–7)
ERYTHROCYTE [DISTWIDTH] IN BLOOD BY AUTOMATED COUNT: 12.7 % (ref 11.5–14.5)
GFR SERPL CREATININE-BSD FRML MDRD: 53 ML/MIN/1.73 (ref 39–90)
GLOBULIN UR ELPH-MCNC: 3.4 GM/DL (ref 2.3–3.5)
GLUCOSE BLD-MCNC: 92 MG/DL (ref 74–99)
HCT VFR BLD AUTO: 36 % (ref 35–45)
HDLC SERPL-MCNC: 61 MG/DL (ref 40–59)
HGB BLD-MCNC: 11.4 G/DL (ref 12–15.5)
LDLC SERPL CALC-MCNC: 92 MG/DL
LDLC/HDLC SERPL: 1.51 {RATIO} (ref 0–3.22)
LYMPHOCYTES # BLD AUTO: 2 10*3/MM3 (ref 0.6–4.2)
LYMPHOCYTES NFR BLD AUTO: 37 % (ref 10–50)
MCH RBC QN AUTO: 28.8 PG (ref 26.5–34)
MCHC RBC AUTO-ENTMCNC: 31.7 G/DL (ref 31.4–36)
MCV RBC AUTO: 90.9 FL (ref 80–98)
MONOCYTES # BLD AUTO: 0.43 10*3/MM3 (ref 0–0.9)
MONOCYTES NFR BLD AUTO: 8 % (ref 0–12)
NEUTROPHILS # BLD AUTO: 2.53 10*3/MM3 (ref 2–8.6)
NEUTROPHILS NFR BLD AUTO: 46.8 % (ref 37–80)
PLATELET # BLD AUTO: 303 10*3/MM3 (ref 150–450)
PMV BLD AUTO: 9.5 FL (ref 8–12)
POTASSIUM BLD-SCNC: 4.4 MMOL/L (ref 3.4–5)
PROT SERPL-MCNC: 7.5 G/DL (ref 6.3–8.2)
RBC # BLD AUTO: 3.96 10*6/MM3 (ref 3.77–5.16)
SODIUM BLD-SCNC: 142 MMOL/L (ref 137–145)
TRIGL SERPL-MCNC: 95 MG/DL
TSH SERPL DL<=0.05 MIU/L-ACNC: 0.57 MIU/ML (ref 0.46–4.68)
VLDLC SERPL-MCNC: 19 MG/DL
WBC NRBC COR # BLD: 5.4 10*3/MM3 (ref 3.2–9.8)

## 2019-02-18 PROCEDURE — 36415 COLL VENOUS BLD VENIPUNCTURE: CPT | Performed by: INTERNAL MEDICINE

## 2019-02-18 PROCEDURE — 80061 LIPID PANEL: CPT | Performed by: INTERNAL MEDICINE

## 2019-02-18 PROCEDURE — 80053 COMPREHEN METABOLIC PANEL: CPT | Performed by: INTERNAL MEDICINE

## 2019-02-18 PROCEDURE — 84443 ASSAY THYROID STIM HORMONE: CPT | Performed by: INTERNAL MEDICINE

## 2019-02-18 PROCEDURE — 85025 COMPLETE CBC W/AUTO DIFF WBC: CPT | Performed by: INTERNAL MEDICINE

## 2019-02-18 PROCEDURE — 82306 VITAMIN D 25 HYDROXY: CPT | Performed by: INTERNAL MEDICINE

## 2019-02-22 DIAGNOSIS — M81.6 LOCALIZED OSTEOPOROSIS WITHOUT CURRENT PATHOLOGICAL FRACTURE: Primary | Chronic | ICD-10-CM

## 2019-02-25 ENCOUNTER — OFFICE VISIT (OUTPATIENT)
Dept: FAMILY MEDICINE CLINIC | Facility: CLINIC | Age: 74
End: 2019-02-25

## 2019-02-25 VITALS
DIASTOLIC BLOOD PRESSURE: 80 MMHG | HEIGHT: 62 IN | SYSTOLIC BLOOD PRESSURE: 136 MMHG | HEART RATE: 86 BPM | TEMPERATURE: 98 F | BODY MASS INDEX: 20.06 KG/M2 | WEIGHT: 109 LBS

## 2019-02-25 DIAGNOSIS — E55.9 VITAMIN D DEFICIENCY: Chronic | ICD-10-CM

## 2019-02-25 DIAGNOSIS — K21.9 GASTROESOPHAGEAL REFLUX DISEASE WITHOUT ESOPHAGITIS: Chronic | ICD-10-CM

## 2019-02-25 DIAGNOSIS — I10 ESSENTIAL (PRIMARY) HYPERTENSION: Primary | Chronic | ICD-10-CM

## 2019-02-25 DIAGNOSIS — I34.0 MITRAL VALVE INSUFFICIENCY, UNSPECIFIED ETIOLOGY: Chronic | ICD-10-CM

## 2019-02-25 DIAGNOSIS — J43.2 CENTRILOBULAR EMPHYSEMA (HCC): Chronic | ICD-10-CM

## 2019-02-25 DIAGNOSIS — J30.1 SEASONAL ALLERGIC RHINITIS DUE TO POLLEN: Chronic | ICD-10-CM

## 2019-02-25 DIAGNOSIS — J45.20 MILD INTERMITTENT ASTHMA WITHOUT COMPLICATION: Chronic | ICD-10-CM

## 2019-02-25 DIAGNOSIS — E78.00 HYPERCHOLESTEROLEMIA: Chronic | ICD-10-CM

## 2019-02-25 DIAGNOSIS — D51.9 ANEMIA DUE TO VITAMIN B12 DEFICIENCY, UNSPECIFIED B12 DEFICIENCY TYPE: Chronic | ICD-10-CM

## 2019-02-25 DIAGNOSIS — M81.6 LOCALIZED OSTEOPOROSIS WITHOUT CURRENT PATHOLOGICAL FRACTURE: Chronic | ICD-10-CM

## 2019-02-25 PROCEDURE — 99214 OFFICE O/P EST MOD 30 MIN: CPT | Performed by: INTERNAL MEDICINE

## 2019-02-25 PROCEDURE — G0008 ADMIN INFLUENZA VIRUS VAC: HCPCS | Performed by: INTERNAL MEDICINE

## 2019-02-25 PROCEDURE — 90662 IIV NO PRSV INCREASED AG IM: CPT | Performed by: INTERNAL MEDICINE

## 2019-02-25 RX ORDER — ALBUTEROL SULFATE 90 UG/1
2 AEROSOL, METERED RESPIRATORY (INHALATION) 4 TIMES DAILY PRN
Qty: 1 INHALER | Refills: 2 | Status: SHIPPED | OUTPATIENT
Start: 2019-02-25 | End: 2020-03-02 | Stop reason: SDUPTHER

## 2019-02-25 NOTE — PROGRESS NOTES
"Subjective     Court Johnson is a 73 y.o. female.     History of Present Illness     Court Johnson is a 73 y.o. female six-month follow-up of hypertension, vitamin D deficiency, osteoporosis, anemia due to vitamin B12 deficiency, and other issues.     Her blood pressure is well controlled today.  Her weight is stable.    She will be receiving her next IV Reclast in March.  She continues on calcium/vitamin D supplement.    Her Pneumovax vaccination will be due in 6 months.  She failed to get her influenza vaccination earlier, so we are giving that today.    Her labs are all reviewed with results listed below.  Hemoglobin is 11.4.  Liver and renal functions are normal.  LDL is 92 and HDL is 61.  Vitamin D is 56.7.    Review of Systems   Constitutional: Negative for chills, fatigue and fever.   HENT: Negative for congestion, ear pain, postnasal drip, sinus pressure and sore throat.    Respiratory: Negative for cough, shortness of breath and wheezing.    Cardiovascular: Negative for chest pain, palpitations and leg swelling.   Gastrointestinal: Negative for abdominal pain, blood in stool, constipation, diarrhea, nausea and vomiting.   Endocrine: Negative for cold intolerance, heat intolerance, polydipsia and polyuria.   Genitourinary: Negative for dysuria, frequency, hematuria and urgency.   Skin: Negative for rash.   Neurological: Negative for syncope and weakness.       Objective     /80   Pulse 86   Temp 98 °F (36.7 °C) (Oral)   Ht 157.5 cm (62\")   Wt 49.4 kg (109 lb)   Breastfeeding? No   BMI 19.94 kg/m²     Physical Exam   Constitutional: She is oriented to person, place, and time. She appears well-developed and well-nourished. No distress.   Very pleasant female.    HENT:   Head: Normocephalic and atraumatic.   Nose: Right sinus exhibits no maxillary sinus tenderness and no frontal sinus tenderness. Left sinus exhibits no maxillary sinus tenderness and no frontal sinus tenderness. "   Mouth/Throat: Uvula is midline, oropharynx is clear and moist and mucous membranes are normal. No oral lesions. No tonsillar exudate.   Mild clear postnaasal drip     Eyes: Conjunctivae and EOM are normal. Pupils are equal, round, and reactive to light.   Neck: Trachea normal. Neck supple. No JVD present. Carotid bruit is not present. No tracheal deviation present. No thyroid mass and no thyromegaly present.   Cardiovascular: Normal rate, regular rhythm and intact distal pulses.  No extrasystoles are present. PMI is not displaced.   Murmur heard.  2/6 systolic murmur heard best at left sternal border.     Pulmonary/Chest: Effort normal and breath sounds normal. No accessory muscle usage. No respiratory distress. She has no decreased breath sounds. She has no wheezes. She has no rhonchi. She has no rales.   A few chronic lung sounds.    Abdominal: Soft. Bowel sounds are normal. She exhibits no distension. There is no hepatosplenomegaly. There is no tenderness.   Musculoskeletal:   Kyphotic curvature of the spine.          Vascular Status -  Her right foot exhibits normal foot vasculature  and no edema. Her left foot exhibits normal foot vasculature  and no edema.  Lymphadenopathy:     She has no cervical adenopathy.   Neurological: She is alert and oriented to person, place, and time. No cranial nerve deficit. Coordination normal.   Skin: Skin is warm, dry and intact. No rash noted. No cyanosis. Nails show no clubbing.   Psychiatric: She has a normal mood and affect. Her speech is normal and behavior is normal. Judgment and thought content normal.   Vitals reviewed.      PHQ-2/PHQ-9 Depression Screening 2/25/2019   Little interest or pleasure in doing things 0   Feeling down, depressed, or hopeless 0   Total Score 0       Assessment/Plan     Continue the lisinopril and sodium restriction.    Her cholesterol continues to be diet controlled.  We will continue to monitor this annually.    Continue the use of Flonase  nasal spray, Singulair, and nondrowsy antihistamines as needed.  Continue the albuterol rescue inhaler as needed for mild intermittent asthma.    Continue Zantac 300 mg nightly.  Dr. Damon increased this when he saw her last year.  Symptoms of swallowing difficulties/possible globus sensation have resolved.    Continue the calcium/vitamin D supplements and the annual IV Reclast.    She will be due for Pneumovax in 6 months.  We will order her mammogram if she has not obtained it prior to then.    She received influenza vaccination today without difficulty or complication.    Continue other vitamin/mineral supplements.    Return to clinic in 6 months with fasting labs prior.    Diagnoses and all orders for this visit:    Essential (primary) hypertension  -     CBC Auto Differential; Future  -     Comprehensive Metabolic Panel; Future    Hypercholesterolemia    Mitral valve insufficiency, unspecified etiology    Seasonal allergic rhinitis due to pollen    Centrilobular emphysema (CMS/HCC)    Mild intermittent asthma without complication    Gastroesophageal reflux disease without esophagitis    Anemia due to vitamin B12 deficiency, unspecified B12 deficiency type  -     Vitamin B12; Future  -     Ferritin; Future    Vitamin D deficiency  -     Vitamin D 25 Hydroxy; Future    Localized osteoporosis without current pathological fracture  -     Vitamin D 25 Hydroxy; Future    Other orders  -     albuterol sulfate HFA (PROAIR HFA) 108 (90 Base) MCG/ACT inhaler; Inhale 2 puffs 4 (Four) Times a Day As Needed for Shortness of Air (cough). 90 mcg/actuation  -     Fluzone High Dose =>65Years        Lab on 02/18/2019   Component Date Value Ref Range Status   • WBC 02/18/2019 5.40  3.20 - 9.80 10*3/mm3 Final   • RBC 02/18/2019 3.96  3.77 - 5.16 10*6/mm3 Final   • Hemoglobin 02/18/2019 11.4* 12.0 - 15.5 g/dL Final   • Hematocrit 02/18/2019 36.0  35.0 - 45.0 % Final   • MCV 02/18/2019 90.9  80.0 - 98.0 fL Final   • MCH 02/18/2019  28.8  26.5 - 34.0 pg Final   • MCHC 02/18/2019 31.7  31.4 - 36.0 g/dL Final   • RDW 02/18/2019 12.7  11.5 - 14.5 % Final   • RDW-SD 02/18/2019 41.4  36.4 - 46.3 fl Final   • MPV 02/18/2019 9.5  8.0 - 12.0 fL Final   • Platelets 02/18/2019 303  150 - 450 10*3/mm3 Final   • Neutrophil % 02/18/2019 46.8  37.0 - 80.0 % Final   • Lymphocyte % 02/18/2019 37.0  10.0 - 50.0 % Final   • Monocyte % 02/18/2019 8.0  0.0 - 12.0 % Final   • Eosinophil % 02/18/2019 7.6* 0.0 - 7.0 % Final   • Basophil % 02/18/2019 0.6  0.0 - 2.0 % Final   • Neutrophils, Absolute 02/18/2019 2.53  2.00 - 8.60 10*3/mm3 Final   • Lymphocytes, Absolute 02/18/2019 2.00  0.60 - 4.20 10*3/mm3 Final   • Monocytes, Absolute 02/18/2019 0.43  0.00 - 0.90 10*3/mm3 Final   • Eosinophils, Absolute 02/18/2019 0.41  0.00 - 0.70 10*3/mm3 Final   • Basophils, Absolute 02/18/2019 0.03  0.00 - 0.20 10*3/mm3 Final   • Glucose 02/18/2019 92  74 - 99 mg/dL Final   • BUN 02/18/2019 14  7 - 17 mg/dL Final   • Creatinine 02/18/2019 1.02  0.52 - 1.04 mg/dL Final   • Sodium 02/18/2019 142  137 - 145 mmol/L Final   • Potassium 02/18/2019 4.4  3.4 - 5.0 mmol/L Final   • Chloride 02/18/2019 106  98 - 107 mmol/L Final   • CO2 02/18/2019 31.0* 22.0 - 30.0 mmol/L Final   • Calcium 02/18/2019 9.6  8.4 - 10.2 mg/dL Final   • Total Protein 02/18/2019 7.5  6.3 - 8.2 g/dL Final   • Albumin 02/18/2019 4.10  3.50 - 5.00 g/dL Final   • ALT (SGPT) 02/18/2019 15  <=35 U/L Final   • AST (SGOT) 02/18/2019 20  14 - 36 U/L Final   • Alkaline Phosphatase 02/18/2019 92  38 - 126 U/L Final   • Total Bilirubin 02/18/2019 0.4  0.2 - 1.3 mg/dL Final   • eGFR Non African Amer 02/18/2019 53  39 - 90 mL/min/1.73 Final   • Globulin 02/18/2019 3.4  2.3 - 3.5 gm/dL Final   • A/G Ratio 02/18/2019 1.2  1.1 - 1.8 g/dL Final   • BUN/Creatinine Ratio 02/18/2019 13.7  7.0 - 25.0 Final   • Anion Gap 02/18/2019 5.0  5.0 - 15.0 mmol/L Final   • Total Cholesterol 02/18/2019 172  150 - 200 mg/dL Final   • Triglycerides  02/18/2019 95  <=150 mg/dL Final   • HDL Cholesterol 02/18/2019 61* 40 - 59 mg/dL Final   • LDL Cholesterol  02/18/2019 92  <=100 mg/dL Final   • VLDL Cholesterol 02/18/2019 19  mg/dL Final   • LDL/HDL Ratio 02/18/2019 1.51  0.00 - 3.22 Final   • TSH 02/18/2019 0.570  0.460 - 4.680 mIU/mL Final   • 25 Hydroxy, Vitamin D 02/18/2019 56.7  30.0 - 100.0 ng/ml Final   ]

## 2019-03-01 ENCOUNTER — INFUSION (OUTPATIENT)
Dept: ONCOLOGY | Facility: HOSPITAL | Age: 74
End: 2019-03-01

## 2019-03-01 VITALS
HEART RATE: 83 BPM | TEMPERATURE: 98.1 F | DIASTOLIC BLOOD PRESSURE: 69 MMHG | RESPIRATION RATE: 16 BRPM | SYSTOLIC BLOOD PRESSURE: 141 MMHG

## 2019-03-01 DIAGNOSIS — M81.6 LOCALIZED OSTEOPOROSIS WITHOUT CURRENT PATHOLOGICAL FRACTURE: Primary | ICD-10-CM

## 2019-03-01 PROCEDURE — 96374 THER/PROPH/DIAG INJ IV PUSH: CPT | Performed by: NURSE PRACTITIONER

## 2019-03-01 PROCEDURE — 25010000002 ZOLEDRONIC ACID 5 MG/100ML SOLUTION: Performed by: NURSE PRACTITIONER

## 2019-03-01 RX ORDER — ZOLEDRONIC ACID 5 MG/100ML
5 INJECTION, SOLUTION INTRAVENOUS ONCE
Status: CANCELLED | OUTPATIENT
Start: 2019-03-01

## 2019-03-01 RX ORDER — ZOLEDRONIC ACID 5 MG/100ML
5 INJECTION, SOLUTION INTRAVENOUS ONCE
Status: COMPLETED | OUTPATIENT
Start: 2019-03-01 | End: 2019-03-01

## 2019-03-01 RX ORDER — SODIUM CHLORIDE 9 MG/ML
250 INJECTION, SOLUTION INTRAVENOUS ONCE
Status: COMPLETED | OUTPATIENT
Start: 2019-03-01 | End: 2019-03-01

## 2019-03-01 RX ORDER — SODIUM CHLORIDE 9 MG/ML
250 INJECTION, SOLUTION INTRAVENOUS ONCE
Status: CANCELLED | OUTPATIENT
Start: 2019-03-01

## 2019-03-01 RX ADMIN — ZOLEDRONIC ACID 5 MG: 5 INJECTION, SOLUTION INTRAVENOUS at 13:18

## 2019-03-01 RX ADMIN — SODIUM CHLORIDE 250 ML: 9 INJECTION, SOLUTION INTRAVENOUS at 13:18

## 2019-03-08 DIAGNOSIS — Z12.31 ENCOUNTER FOR SCREENING MAMMOGRAM FOR MALIGNANT NEOPLASM OF BREAST: Primary | ICD-10-CM

## 2019-03-12 RX ORDER — LISINOPRIL 20 MG/1
TABLET ORAL
Qty: 90 TABLET | Refills: 3 | Status: SHIPPED | OUTPATIENT
Start: 2019-03-12 | End: 2020-03-02 | Stop reason: SDUPTHER

## 2019-08-20 ENCOUNTER — LAB (OUTPATIENT)
Dept: LAB | Facility: OTHER | Age: 74
End: 2019-08-20

## 2019-08-20 DIAGNOSIS — D51.9 ANEMIA DUE TO VITAMIN B12 DEFICIENCY, UNSPECIFIED B12 DEFICIENCY TYPE: Chronic | ICD-10-CM

## 2019-08-20 DIAGNOSIS — I10 ESSENTIAL (PRIMARY) HYPERTENSION: Chronic | ICD-10-CM

## 2019-08-20 DIAGNOSIS — E55.9 VITAMIN D DEFICIENCY: Chronic | ICD-10-CM

## 2019-08-20 DIAGNOSIS — M81.6 LOCALIZED OSTEOPOROSIS WITHOUT CURRENT PATHOLOGICAL FRACTURE: Chronic | ICD-10-CM

## 2019-08-20 LAB
25(OH)D3 SERPL-MCNC: 78.5 NG/ML (ref 30–100)
ALBUMIN SERPL-MCNC: 4.1 G/DL (ref 3.5–5)
ALBUMIN/GLOB SERPL: 1.2 G/DL (ref 1.1–1.8)
ALP SERPL-CCNC: 78 U/L (ref 38–126)
ALT SERPL W P-5'-P-CCNC: 12 U/L
ANION GAP SERPL CALCULATED.3IONS-SCNC: 10 MMOL/L (ref 5–15)
AST SERPL-CCNC: 22 U/L (ref 14–36)
BASOPHILS # BLD AUTO: 0.02 10*3/MM3 (ref 0–0.2)
BASOPHILS NFR BLD AUTO: 0.4 % (ref 0–1.5)
BILIRUB SERPL-MCNC: 0.6 MG/DL (ref 0.2–1.3)
BUN BLD-MCNC: 19 MG/DL (ref 7–23)
BUN/CREAT SERPL: 19.2 (ref 7–25)
CALCIUM SPEC-SCNC: 9.7 MG/DL (ref 8.4–10.2)
CHLORIDE SERPL-SCNC: 105 MMOL/L (ref 101–112)
CO2 SERPL-SCNC: 29 MMOL/L (ref 22–30)
CREAT BLD-MCNC: 0.99 MG/DL (ref 0.52–1.04)
DEPRECATED RDW RBC AUTO: 42.7 FL (ref 37–54)
EOSINOPHIL # BLD AUTO: 0.49 10*3/MM3 (ref 0–0.4)
EOSINOPHIL NFR BLD AUTO: 8.9 % (ref 0.3–6.2)
ERYTHROCYTE [DISTWIDTH] IN BLOOD BY AUTOMATED COUNT: 13.2 % (ref 12.3–15.4)
FERRITIN SERPL-MCNC: 21.7 NG/ML (ref 13–150)
GFR SERPL CREATININE-BSD FRML MDRD: 55 ML/MIN/1.73 (ref 39–90)
GLOBULIN UR ELPH-MCNC: 3.4 GM/DL (ref 2.3–3.5)
GLUCOSE BLD-MCNC: 93 MG/DL (ref 70–99)
HCT VFR BLD AUTO: 38.1 % (ref 34–46.6)
HGB BLD-MCNC: 12.5 G/DL (ref 12–15.9)
LYMPHOCYTES # BLD AUTO: 2.2 10*3/MM3 (ref 0.7–3.1)
LYMPHOCYTES NFR BLD AUTO: 40 % (ref 19.6–45.3)
MCH RBC QN AUTO: 29.7 PG (ref 26.6–33)
MCHC RBC AUTO-ENTMCNC: 32.8 G/DL (ref 31.5–35.7)
MCV RBC AUTO: 90.5 FL (ref 79–97)
MONOCYTES # BLD AUTO: 0.51 10*3/MM3 (ref 0.1–0.9)
MONOCYTES NFR BLD AUTO: 9.3 % (ref 5–12)
NEUTROPHILS # BLD AUTO: 2.28 10*3/MM3 (ref 1.7–7)
NEUTROPHILS NFR BLD AUTO: 41.4 % (ref 42.7–76)
PLATELET # BLD AUTO: 265 10*3/MM3 (ref 140–450)
PMV BLD AUTO: 9.5 FL (ref 6–12)
POTASSIUM BLD-SCNC: 4.7 MMOL/L (ref 3.4–5)
PROT SERPL-MCNC: 7.5 G/DL (ref 6.3–8.6)
RBC # BLD AUTO: 4.21 10*6/MM3 (ref 3.77–5.28)
SODIUM BLD-SCNC: 144 MMOL/L (ref 137–145)
VIT B12 BLD-MCNC: 675 PG/ML (ref 211–946)
WBC NRBC COR # BLD: 5.5 10*3/MM3 (ref 3.4–10.8)

## 2019-08-20 PROCEDURE — 85025 COMPLETE CBC W/AUTO DIFF WBC: CPT | Performed by: INTERNAL MEDICINE

## 2019-08-20 PROCEDURE — 80053 COMPREHEN METABOLIC PANEL: CPT | Performed by: INTERNAL MEDICINE

## 2019-08-20 PROCEDURE — 82607 VITAMIN B-12: CPT | Performed by: INTERNAL MEDICINE

## 2019-08-20 PROCEDURE — 82728 ASSAY OF FERRITIN: CPT | Performed by: INTERNAL MEDICINE

## 2019-08-20 PROCEDURE — 82306 VITAMIN D 25 HYDROXY: CPT | Performed by: INTERNAL MEDICINE

## 2019-08-20 PROCEDURE — 36415 COLL VENOUS BLD VENIPUNCTURE: CPT | Performed by: INTERNAL MEDICINE

## 2019-08-27 ENCOUNTER — OFFICE VISIT (OUTPATIENT)
Dept: FAMILY MEDICINE CLINIC | Facility: CLINIC | Age: 74
End: 2019-08-27

## 2019-08-27 VITALS
SYSTOLIC BLOOD PRESSURE: 130 MMHG | DIASTOLIC BLOOD PRESSURE: 80 MMHG | TEMPERATURE: 97.8 F | HEART RATE: 68 BPM | BODY MASS INDEX: 20.24 KG/M2 | HEIGHT: 62 IN | WEIGHT: 110 LBS

## 2019-08-27 DIAGNOSIS — E78.00 HYPERCHOLESTEROLEMIA: Chronic | ICD-10-CM

## 2019-08-27 DIAGNOSIS — J45.20 MILD INTERMITTENT ASTHMA WITHOUT COMPLICATION: Chronic | ICD-10-CM

## 2019-08-27 DIAGNOSIS — J30.1 SEASONAL ALLERGIC RHINITIS DUE TO POLLEN: Chronic | ICD-10-CM

## 2019-08-27 DIAGNOSIS — I36.1 TRICUSPID VALVE INCOMPETENCE, NON-RHEUMATIC: Chronic | ICD-10-CM

## 2019-08-27 DIAGNOSIS — I34.0 MITRAL VALVE INSUFFICIENCY, UNSPECIFIED ETIOLOGY: Chronic | ICD-10-CM

## 2019-08-27 DIAGNOSIS — D51.9 ANEMIA DUE TO VITAMIN B12 DEFICIENCY, UNSPECIFIED B12 DEFICIENCY TYPE: Chronic | ICD-10-CM

## 2019-08-27 DIAGNOSIS — E55.9 VITAMIN D DEFICIENCY: Chronic | ICD-10-CM

## 2019-08-27 DIAGNOSIS — I10 ESSENTIAL (PRIMARY) HYPERTENSION: Primary | Chronic | ICD-10-CM

## 2019-08-27 DIAGNOSIS — M72.0 DUPUYTREN'S DISEASE: Chronic | ICD-10-CM

## 2019-08-27 DIAGNOSIS — M50.90 CERVICAL DISC DISORDER: Chronic | ICD-10-CM

## 2019-08-27 DIAGNOSIS — M81.6 LOCALIZED OSTEOPOROSIS WITHOUT CURRENT PATHOLOGICAL FRACTURE: Chronic | ICD-10-CM

## 2019-08-27 PROCEDURE — G0009 ADMIN PNEUMOCOCCAL VACCINE: HCPCS | Performed by: INTERNAL MEDICINE

## 2019-08-27 PROCEDURE — 90732 PPSV23 VACC 2 YRS+ SUBQ/IM: CPT | Performed by: INTERNAL MEDICINE

## 2019-08-27 PROCEDURE — G0439 PPPS, SUBSEQ VISIT: HCPCS | Performed by: INTERNAL MEDICINE

## 2019-08-27 NOTE — PROGRESS NOTES
"Subjective        History of Present Illness     Court Johnson is a 74 y.o. female who comes in for six-month follow-up of hypertension, vitamin D deficiency, osteoporosis, anemia due to vitamin B12 deficiency, and other issues.  Denies any recent asthma flares.  GERD symptoms adequately managed with episodic use of Zantac.     DEXA 02/2018 reveals persistent osteoporosis, improved in the hip, but worse in the lumbar spine.   She had IV Reclast in March 2018 and is scheduled for her next IV infusion 03/2020.  She also continues on calcium/vitamin D supplements.    She is due Pneumovax.  She had Prevnar 08/2018.       She is up to date on mammogram.       Weight is up 1 pound in the past six months.  Blood pressure is at goal.     The patient's relevant past medical, surgical, and social history was reviewed in Epic.   Lab results are reviewed with the patient today.  CBC unremarkable.  Fasting glucose 93.  Normal renal and liver function.  Vitamin B-12 and vitamin D at goal.   Iron level is a little low, although, hemoglobin is normal.      Review of Systems   Constitutional: Negative for chills, fatigue and fever.   HENT: Negative for congestion, ear pain, postnasal drip, sinus pressure and sore throat.    Respiratory: Negative for cough, shortness of breath and wheezing.    Cardiovascular: Negative for chest pain, palpitations and leg swelling.   Gastrointestinal: Negative for abdominal pain, blood in stool, constipation, diarrhea, nausea and vomiting.   Endocrine: Negative for cold intolerance, heat intolerance, polydipsia and polyuria.   Genitourinary: Negative for dysuria, frequency, hematuria and urgency.   Skin: Negative for rash.   Neurological: Negative for syncope and weakness.      Objective     Vitals:    08/27/19 1019   BP: 130/80   Pulse: 68   Temp: 97.8 °F (36.6 °C)   TempSrc: Oral   Weight: 49.9 kg (110 lb)   Height: 157.5 cm (62\")     Physical Exam   Constitutional: She is oriented to person, " place, and time. She appears well-developed and well-nourished. No distress.   Very pleasant female.     HENT:   Head: Normocephalic and atraumatic.   Nose: Right sinus exhibits no maxillary sinus tenderness and no frontal sinus tenderness. Left sinus exhibits no maxillary sinus tenderness and no frontal sinus tenderness.   Mouth/Throat: Uvula is midline, oropharynx is clear and moist and mucous membranes are normal. No oral lesions. No tonsillar exudate.   Squamous debris on the tongue.     Eyes: Conjunctivae and EOM are normal. Pupils are equal, round, and reactive to light.   Neck: Trachea normal. Neck supple. No JVD present. Carotid bruit is not present. No tracheal deviation present. No thyroid mass and no thyromegaly present.   Cardiovascular: Normal rate, regular rhythm and intact distal pulses.  No extrasystoles are present. PMI is not displaced.   Murmur heard.  2/6 systolic murmur heard best at left sternal border.      Pulmonary/Chest: Effort normal and breath sounds normal. No accessory muscle usage. No respiratory distress. She has no decreased breath sounds. She has no wheezes. She has no rhonchi. She has no rales.   A few chronic lung sounds.    Abdominal: Soft. Bowel sounds are normal. She exhibits no distension. There is no hepatosplenomegaly. There is no tenderness.   Musculoskeletal:   Bilateral dupuytren contractures.  Kyphotic curvature of thoracic spine.        Vascular Status -  Her right foot exhibits normal foot vasculature  and no edema. Her left foot exhibits normal foot vasculature  and no edema.  Lymphadenopathy:     She has no cervical adenopathy.   Neurological: She is alert and oriented to person, place, and time. No cranial nerve deficit. Coordination normal.   Skin: Skin is warm, dry and intact. No rash noted. No cyanosis. Nails show no clubbing.   Psychiatric: She has a normal mood and affect. Her speech is normal and behavior is normal. Judgment and thought content normal.    Vitals reviewed.      Assessment/Plan      Recommended she brush her tongue to help remove the squamous debris on the tongue.    Continue the calcium/vitamin D supplements and the annual IV Reclast.  She will be due repeat DEXA 02/2020.     After informed verbal consent, patient is given Pneumovax today.  Recommended she get influenza vaccine approximately October.      Continue the lisinopril and sodium restriction.    Continue other medications and vitamin and mineral supplements to treat additional medical problems which we addressed today.   Return in six months for follow up.        Scribed for Dr. Heart by Josef Beckman.     Diagnoses and all orders for this visit:    Essential (primary) hypertension  -     CBC Auto Differential; Future  -     Comprehensive Metabolic Panel; Future    Hypercholesterolemia  -     Lipid Panel; Future    Mitral valve insufficiency, unspecified etiology    Tricuspid valve incompetence, non-rheumatic    Seasonal allergic rhinitis due to pollen    Mild intermittent asthma without complication    Anemia due to vitamin B12 deficiency, unspecified B12 deficiency type  -     Ferritin; Future  -     Vitamin B12; Future    Vitamin D deficiency  -     Vitamin D 25 Hydroxy; Future    Cervical disc disorder    Dupuytren's disease    Localized osteoporosis without current pathological fracture  -     Vitamin D 25 Hydroxy; Future    Other orders  -     Zoledronic Acid (RECLAST IV); Infuse  into a venous catheter. annually  -     Pneumococcal Polysaccharide Vaccine 23-Valent Greater Than or Equal To 1yo Subcutaneous / IM        Lab on 08/20/2019   Component Date Value Ref Range Status   • WBC 08/20/2019 5.50  3.40 - 10.80 10*3/mm3 Final   • RBC 08/20/2019 4.21  3.77 - 5.28 10*6/mm3 Final   • Hemoglobin 08/20/2019 12.5  12.0 - 15.9 g/dL Final   • Hematocrit 08/20/2019 38.1  34.0 - 46.6 % Final   • MCV 08/20/2019 90.5  79.0 - 97.0 fL Final   • MCH 08/20/2019 29.7  26.6 - 33.0 pg Final    • MCHC 08/20/2019 32.8  31.5 - 35.7 g/dL Final   • RDW 08/20/2019 13.2  12.3 - 15.4 % Final   • RDW-SD 08/20/2019 42.7  37.0 - 54.0 fl Final   • MPV 08/20/2019 9.5  6.0 - 12.0 fL Final   • Platelets 08/20/2019 265  140 - 450 10*3/mm3 Final   • Neutrophil % 08/20/2019 41.4* 42.7 - 76.0 % Final   • Lymphocyte % 08/20/2019 40.0  19.6 - 45.3 % Final   • Monocyte % 08/20/2019 9.3  5.0 - 12.0 % Final   • Eosinophil % 08/20/2019 8.9* 0.3 - 6.2 % Final   • Basophil % 08/20/2019 0.4  0.0 - 1.5 % Final   • Neutrophils, Absolute 08/20/2019 2.28  1.70 - 7.00 10*3/mm3 Final   • Lymphocytes, Absolute 08/20/2019 2.20  0.70 - 3.10 10*3/mm3 Final   • Monocytes, Absolute 08/20/2019 0.51  0.10 - 0.90 10*3/mm3 Final   • Eosinophils, Absolute 08/20/2019 0.49* 0.00 - 0.40 10*3/mm3 Final   • Basophils, Absolute 08/20/2019 0.02  0.00 - 0.20 10*3/mm3 Final   • Glucose 08/20/2019 93  70 - 99 mg/dL Final   • BUN 08/20/2019 19  7 - 23 mg/dL Final   • Creatinine 08/20/2019 0.99  0.52 - 1.04 mg/dL Final   • Sodium 08/20/2019 144  137 - 145 mmol/L Final   • Potassium 08/20/2019 4.7  3.4 - 5.0 mmol/L Final   • Chloride 08/20/2019 105  101 - 112 mmol/L Final   • CO2 08/20/2019 29.0  22.0 - 30.0 mmol/L Final   • Calcium 08/20/2019 9.7  8.4 - 10.2 mg/dL Final   • Total Protein 08/20/2019 7.5  6.3 - 8.6 g/dL Final   • Albumin 08/20/2019 4.10  3.50 - 5.00 g/dL Final   • ALT (SGPT) 08/20/2019 12  <=35 U/L Final   • AST (SGOT) 08/20/2019 22  14 - 36 U/L Final   • Alkaline Phosphatase 08/20/2019 78  38 - 126 U/L Final   • Total Bilirubin 08/20/2019 0.6  0.2 - 1.3 mg/dL Final   • eGFR Non  Amer 08/20/2019 55  39 - 90 mL/min/1.73 Final   • Globulin 08/20/2019 3.4  2.3 - 3.5 gm/dL Final   • A/G Ratio 08/20/2019 1.2  1.1 - 1.8 g/dL Final   • BUN/Creatinine Ratio 08/20/2019 19.2  7.0 - 25.0 Final   • Anion Gap 08/20/2019 10.0  5.0 - 15.0 mmol/L Final   • Vitamin B-12 08/20/2019 675  211 - 946 pg/mL Final   • 25 Hydroxy, Vitamin D 08/20/2019 78.5  30.0  - 100.0 ng/ml Final   • Ferritin 08/20/2019 21.70  13.00 - 150.00 ng/mL Final   ]

## 2019-08-27 NOTE — PATIENT INSTRUCTIONS
Medicare Wellness  Personal Prevention Plan of Service     Date of Office Visit:  2019  Encounter Provider:  Andres Heart MD  Place of Service:  [facility]  Patient Name: Court Johnson  :  1945    As part of the Medicare Wellness portion of your visit today, we are providing you with this personalized preventive plan of services (PPPS). This plan is based upon recommendations of the [place] Preventive Services Task Force (USPSTF) and the Advisory Committee on Immunization Practices (ACIP).    This lists the preventive care services that should be considered, and provides dates of when you are due. Items listed as completed are up-to-date and do not require any further intervention.    Health Maintenance   Topic Date Due   • TDAP/TD VACCINES (1 - Tdap) 1964   • ZOSTER VACCINE (2 of 3) 10/23/2007   • HEPATITIS C SCREENING  08/15/2016   • MEDICARE ANNUAL WELLNESS  08/15/2016   • PNEUMOCOCCAL VACCINES (65+ LOW/MEDIUM RISK) (2 of 2 - PPSV23) 2019   • INFLUENZA VACCINE  2019   • DXA SCAN  2020   • LIPID PANEL  2020   • MAMMOGRAM  2021   • COLONOSCOPY  2021       No orders of the defined types were placed in this encounter.      Return in about 6 months (around 2020) for Next scheduled follow up.          Exercising to Stay Healthy  To become healthy and stay healthy, it is recommended that you do moderate-intensity and vigorous-intensity exercise. You can tell that you are exercising at a moderate intensity if your heart starts beating faster and you start breathing faster but can still hold a conversation. You can tell that you are exercising at a vigorous intensity if you are breathing much harder and faster and cannot hold a conversation while exercising.  Exercising regularly is important. It has many health benefits, such as:  · Improving overall fitness, flexibility, and endurance.  · Increasing bone  density.  · Helping with weight control.  · Decreasing body fat.  · Increasing muscle strength.  · Reducing stress and tension.  · Improving overall health.  How often should I exercise?  Choose an activity that you enjoy, and set realistic goals. Your health care provider can help you make an activity plan that works for you.  Exercise regularly as told by your health care provider. This may include:  · Doing strength training two times a week, such as:  ? Lifting weights.  ? Using resistance bands.  ? Push-ups.  ? Sit-ups.  ? Yoga.  · Doing a certain intensity of exercise for a given amount of time. Choose from these options:  ? A total of 150 minutes of moderate-intensity exercise every week.  ? A total of 75 minutes of vigorous-intensity exercise every week.  ? A mix of moderate-intensity and vigorous-intensity exercise every week.  Children, pregnant women, people who have not exercised regularly, people who are overweight, and older adults may need to talk with a health care provider about what activities are safe to do. If you have a medical condition, be sure to talk with your health care provider before you start a new exercise program.  What are some exercise ideas?  Moderate-intensity exercise ideas include:  · Walking 1 mile (1.6 km) in about 15 minutes.  · Biking.  · Hiking.  · Golfing.  · Dancing.  · Water aerobics.  Vigorous-intensity exercise ideas include:  · Walking 4.5 miles (7.2 km) or more in about 1 hour.  · Jogging or running 5 miles (8 km) in about 1 hour.  · Biking 10 miles (16.1 km) or more in about 1 hour.  · Lap swimming.  · Roller-skating or in-line skating.  · Cross-country skiing.  · Vigorous competitive sports, such as football, basketball, and soccer.  · Jumping rope.  · Aerobic dancing.  What are some everyday activities that can help me to get exercise?  · Yard work, such as:  ? Pushing a .  ? Raking and bagging leaves.  · Washing your car.  · Pushing a  stroller.  · Shoveling snow.  · Gardening.  · Washing windows or floors.  How can I be more active in my day-to-day activities?  · Use stairs instead of an elevator.  · Take a walk during your lunch break.  · If you drive, park your car farther away from your work or school.  · If you take public transportation, get off one stop early and walk the rest of the way.  · Stand up or walk around during all of your indoor phone calls.  · Get up, stretch, and walk around every 30 minutes throughout the day.  · Enjoy exercise with a friend. Support to continue exercising will help you keep a regular routine of activity.  What guidelines can I follow while exercising?  · Before you start a new exercise program, talk with your health care provider.  · Do not exercise so much that you hurt yourself, feel dizzy, or get very short of breath.  · Wear comfortable clothes and wear shoes with good support.  · Drink plenty of water while you exercise to prevent dehydration or heat stroke.  · Work out until your breathing and your heartbeat get faster.  Where to find more information  · [place] Department of Health and Human Services: www.hhs.gov  · Centers for Disease Control and Prevention (CDC): www.cdc.gov  Summary  · Exercising regularly is important. It will improve your overall fitness, flexibility, and endurance.  · Regular exercise also will improve your overall health. It can help you control your weight, reduce stress, and improve your bone density.  · Do not exercise so much that you hurt yourself, feel dizzy, or get very short of breath.  · Before you start a new exercise program, talk with your health care provider.  This information is not intended to replace advice given to you by your health care provider. Make sure you discuss any questions you have with your health care provider.  Document Released: 01/20/2012 Document Revised: 11/08/2018 Document Reviewed: 11/08/2018  [employer]vier Interactive Patient Education © 2019  [employer]

## 2019-08-27 NOTE — PROGRESS NOTES
The ABCs of the Annual Wellness Visit  Initial Medicare Wellness Visit    Chief Complaint   Patient presents with   • Follow-up     6 month   • Medicare Wellness-Initial Visit       Subjective   History of Present Illness:  Court Johnson is a 74 y.o. female who presents for an Initial Medicare Wellness Visit.    HEALTH RISK ASSESSMENT    Recent Hospitalizations:  No hospitalization(s) within the last year.    Current Medical Providers:  Patient Care Team:  Andres Heart MD as PCP - General (Internal Medicine)    Smoking Status:  Social History     Tobacco Use   Smoking Status Never Smoker   Smokeless Tobacco Never Used       Alcohol Consumption:  Social History     Substance and Sexual Activity   Alcohol Use No       Depression Screen:   PHQ-2/PHQ-9 Depression Screening 8/27/2019   Little interest or pleasure in doing things 0   Feeling down, depressed, or hopeless 0   Total Score 0       Fall Risk Screen:  STEADI Fall Risk Assessment was completed, and patient is at LOW risk for falls.Assessment completed on:8/27/2019    Health Habits and Functional and Cognitive Screening:  Functional & Cognitive Status 8/27/2019   Do you have difficulty preparing food and eating? No   Do you have difficulty bathing yourself, getting dressed or grooming yourself? No   Do you have difficulty using the toilet? No   Do you have difficulty moving around from place to place? No   Do you have trouble with steps or getting out of a bed or a chair? No   Current Diet Well Balanced Diet   Dental Exam Up to date   Eye Exam Up to date   Exercise (times per week) 7 times per week   Current Exercise Activities Include Yard Work   Do you need help using the phone?  No   Are you deaf or do you have serious difficulty hearing?  Yes   Do you need help with transportation? No   Do you need help shopping? No   Do you need help preparing meals?  No   Do you need help with housework?  No   Do you need help with laundry? No   Do you need help  taking your medications? No   Do you need help managing money? No   Do you ever drive or ride in a car without wearing a seat belt? No   Have you felt unusual stress, anger or loneliness in the last month? No   Who do you live with? Spouse   If you need help, do you have trouble finding someone available to you? No   Have you been bothered in the last four weeks by sexual problems? No   Do you have difficulty concentrating, remembering or making decisions? No         Does the patient have evidence of cognitive impairment? No    Asprin use counseling:Does not need ASA (and currently is not on it)    Age-appropriate Screening Schedule:  Refer to the list below for future screening recommendations based on patient's age, sex and/or medical conditions. Orders for these recommended tests are listed in the plan section. The patient has been provided with a written plan.    Health Maintenance   Topic Date Due   • TDAP/TD VACCINES (1 - Tdap) 04/02/1964   • ZOSTER VACCINE (2 of 3) 10/23/2007   • PNEUMOCOCCAL VACCINES (65+ LOW/MEDIUM RISK) (2 of 2 - PPSV23) 08/21/2019   • INFLUENZA VACCINE  08/01/2019   • DXA SCAN  02/13/2020   • LIPID PANEL  02/18/2020   • MAMMOGRAM  03/18/2021   • COLONOSCOPY  04/01/2021          The following portions of the patient's history were reviewed and updated as appropriate:   She  has a past medical history of Allergic rhinitis, seasonal, Aptyalism, Cervical disc disorder, Chronic obstructive lung disease (CMS/HCC), Closed fracture of ankle (08/2015), Degenerative joint disease involving multiple joints, Dupuytren's disease, Essential (primary) hypertension, GERD (gastroesophageal reflux disease), Glossitis, Hypercholesterolemia, Hyperthyroidism, Impaired fasting glucose, Mild intermittent asthma, Mitral valve regurgitation, Osteoarthritis of multiple joints, Osteoporosis, Pneumonia, Tricuspid valve incompetence, non-rheumatic, Venous varices, Vitamin B12 deficiency anemia, and Vitamin D  deficiency.  She does not have any pertinent problems on file.  She  has a past surgical history that includes Pap Smear (2012);  section; and Ankle surgery.  Her family history includes Breast cancer in her paternal aunt; Cancer in her other; Diabetes in her brother.  She  reports that she has never smoked. She has never used smokeless tobacco. She reports that she does not drink alcohol or use drugs.  Current Outpatient Medications   Medication Sig Dispense Refill   • albuterol sulfate HFA (PROAIR HFA) 108 (90 Base) MCG/ACT inhaler Inhale 2 puffs 4 (Four) Times a Day As Needed for Shortness of Air (cough). 90 mcg/actuation 1 inhaler 2   • Calcium Carb-Cholecalciferol (CALCIUM CARBONATE-VITAMIN D3) 600-400 MG-UNIT tablet Take 2 tablets by mouth daily.     • fluticasone (FLONASE) 50 MCG/ACT nasal spray 1 spray into the nostril(s) as directed by provider 2 (Two) Times a Day. 1 bottle 11   • lisinopril (PRINIVIL,ZESTRIL) 20 MG tablet TAKE 1 TABLET BY MOUTH ONCE DAILY 90 tablet 3   • meloxicam (MOBIC) 15 MG tablet Take 1 tablet by mouth Daily As Needed (pain). Take with food 15 tablet 0   • montelukast (SINGULAIR) 10 MG tablet Take 1 tablet by mouth Every Night. 30 tablet 0   • raNITIdine (ZANTAC) 300 MG tablet Take 1 tablet by mouth Every Night. 30 tablet 11   • vitamin B-12 (CYANOCOBALAMIN) 500 MCG tablet Take 500 mcg by mouth Daily.     • Zoledronic Acid (RECLAST IV) Infuse  into a venous catheter. annually       No current facility-administered medications for this visit.      Current Outpatient Medications on File Prior to Visit   Medication Sig   • albuterol sulfate HFA (PROAIR HFA) 108 (90 Base) MCG/ACT inhaler Inhale 2 puffs 4 (Four) Times a Day As Needed for Shortness of Air (cough). 90 mcg/actuation   • Calcium Carb-Cholecalciferol (CALCIUM CARBONATE-VITAMIN D3) 600-400 MG-UNIT tablet Take 2 tablets by mouth daily.   • fluticasone (FLONASE) 50 MCG/ACT nasal spray 1 spray into the nostril(s) as  directed by provider 2 (Two) Times a Day.   • lisinopril (PRINIVIL,ZESTRIL) 20 MG tablet TAKE 1 TABLET BY MOUTH ONCE DAILY   • meloxicam (MOBIC) 15 MG tablet Take 1 tablet by mouth Daily As Needed (pain). Take with food   • montelukast (SINGULAIR) 10 MG tablet Take 1 tablet by mouth Every Night.   • raNITIdine (ZANTAC) 300 MG tablet Take 1 tablet by mouth Every Night.   • vitamin B-12 (CYANOCOBALAMIN) 500 MCG tablet Take 500 mcg by mouth Daily.   • Zoledronic Acid (RECLAST IV) Infuse  into a venous catheter. annually     No current facility-administered medications on file prior to visit.      She is allergic to boniva [ibandronic acid] and fosamax [alendronate]..    Outpatient Medications Prior to Visit   Medication Sig Dispense Refill   • albuterol sulfate HFA (PROAIR HFA) 108 (90 Base) MCG/ACT inhaler Inhale 2 puffs 4 (Four) Times a Day As Needed for Shortness of Air (cough). 90 mcg/actuation 1 inhaler 2   • Calcium Carb-Cholecalciferol (CALCIUM CARBONATE-VITAMIN D3) 600-400 MG-UNIT tablet Take 2 tablets by mouth daily.     • fluticasone (FLONASE) 50 MCG/ACT nasal spray 1 spray into the nostril(s) as directed by provider 2 (Two) Times a Day. 1 bottle 11   • lisinopril (PRINIVIL,ZESTRIL) 20 MG tablet TAKE 1 TABLET BY MOUTH ONCE DAILY 90 tablet 3   • meloxicam (MOBIC) 15 MG tablet Take 1 tablet by mouth Daily As Needed (pain). Take with food 15 tablet 0   • montelukast (SINGULAIR) 10 MG tablet Take 1 tablet by mouth Every Night. 30 tablet 0   • raNITIdine (ZANTAC) 300 MG tablet Take 1 tablet by mouth Every Night. 30 tablet 11   • vitamin B-12 (CYANOCOBALAMIN) 500 MCG tablet Take 500 mcg by mouth Daily.     • Zoledronic Acid (RECLAST IV) Infuse  into a venous catheter. annually       No facility-administered medications prior to visit.        Patient Active Problem List   Diagnosis   • Vitamin D deficiency   • Venous varices   • Tricuspid valve incompetence, non-rheumatic   • Seborrheic keratosis   • Pneumonia   •  "Osteoporosis   • Osteoarthritis of multiple joints   • Mitral valve regurgitation   • Mild intermittent asthma   • Hypercholesterolemia   • GERD (gastroesophageal reflux disease)   • Essential (primary) hypertension   • Dupuytren's disease   • Degenerative joint disease involving multiple joints   • Vitamin B12 deficiency anemia   • Closed fracture of ankle   • Chronic obstructive lung disease (CMS/[facility])   • Cervical disc disorder   • Aptyalism   • Allergic rhinitis, seasonal       Advanced Care Planning:  Patient does not have an advance directive - information provided to the patient today    Review of Systems    Compared to one year ago, the patient feels her physical health is the same.  Compared to one year ago, the patient feels her mental health is the same.    Reviewed chart for potential of high risk medication in the elderly: yes  Reviewed chart for potential of harmful drug interactions in the elderly:yes    Objective         Vitals:    08/27/19 1019   BP: 130/80   Pulse: 68   Temp: 97.8 °F (36.6 °C)   TempSrc: Oral   Weight: 49.9 kg (110 lb)   Height: 157.5 cm (62\")   PainSc: 0-No pain       Body mass index is 20.12 kg/m².  Discussed the patient's BMI with her. The BMI is in the acceptable range.    Physical Exam          Assessment/Plan   Medicare Risks and Personalized Health Plan  CMS Preventative Services Quick Reference  Advance Directive Discussion  Cardiovascular risk    The above risks/problems have been discussed with the patient.  Pertinent information has been shared with the patient in the After Visit Summary.  Follow up plans and orders are seen below in the Assessment/Plan Section.    Diagnoses and all orders for this visit:    1. Essential (primary) hypertension (Primary)  -     CBC Auto Differential; Future  -     Comprehensive Metabolic Panel; Future    2. Hypercholesterolemia  -     Lipid Panel; Future    3. Mitral valve insufficiency, unspecified etiology    4. Tricuspid valve " incompetence, non-rheumatic    5. Seasonal allergic rhinitis due to pollen    6. Mild intermittent asthma without complication    7. Anemia due to vitamin B12 deficiency, unspecified B12 deficiency type  -     Ferritin; Future  -     Vitamin B12; Future    8. Vitamin D deficiency  -     Vitamin D 25 Hydroxy; Future    9. Cervical disc disorder    10. Dupuytren's disease    11. Localized osteoporosis without current pathological fracture  -     Vitamin D 25 Hydroxy; Future    Other orders  -     Pneumococcal Polysaccharide Vaccine 23-Valent Greater Than or Equal To 3yo Subcutaneous / IM      Follow Up:  Return in about 6 months (around 2/27/2020) for Next scheduled follow up.     An After Visit Summary and PPPS were given to the patient.

## 2020-01-10 DIAGNOSIS — N95.1 POST MENOPAUSAL SYNDROME: ICD-10-CM

## 2020-01-10 DIAGNOSIS — Z12.31 ENCOUNTER FOR SCREENING MAMMOGRAM FOR MALIGNANT NEOPLASM OF BREAST: Primary | ICD-10-CM

## 2020-02-25 ENCOUNTER — LAB (OUTPATIENT)
Dept: LAB | Facility: OTHER | Age: 75
End: 2020-02-25

## 2020-02-25 DIAGNOSIS — E78.00 HYPERCHOLESTEROLEMIA: Chronic | ICD-10-CM

## 2020-02-25 DIAGNOSIS — E55.9 VITAMIN D DEFICIENCY: Chronic | ICD-10-CM

## 2020-02-25 DIAGNOSIS — M81.6 LOCALIZED OSTEOPOROSIS WITHOUT CURRENT PATHOLOGICAL FRACTURE: Chronic | ICD-10-CM

## 2020-02-25 DIAGNOSIS — D51.9 ANEMIA DUE TO VITAMIN B12 DEFICIENCY, UNSPECIFIED B12 DEFICIENCY TYPE: Chronic | ICD-10-CM

## 2020-02-25 DIAGNOSIS — I10 ESSENTIAL (PRIMARY) HYPERTENSION: Chronic | ICD-10-CM

## 2020-02-25 LAB
25(OH)D3 SERPL-MCNC: 48.1 NG/ML (ref 30–100)
ALBUMIN SERPL-MCNC: 4.1 G/DL (ref 3.5–5)
ALBUMIN/GLOB SERPL: 1.1 G/DL (ref 1.1–1.8)
ALP SERPL-CCNC: 80 U/L (ref 38–126)
ALT SERPL W P-5'-P-CCNC: 11 U/L
ANION GAP SERPL CALCULATED.3IONS-SCNC: 4 MMOL/L (ref 5–15)
AST SERPL-CCNC: 21 U/L (ref 14–36)
BASOPHILS # BLD AUTO: 0.06 10*3/MM3 (ref 0–0.2)
BASOPHILS NFR BLD AUTO: 1.1 % (ref 0–1.5)
BILIRUB SERPL-MCNC: 0.5 MG/DL (ref 0.2–1.3)
BUN BLD-MCNC: 16 MG/DL (ref 7–23)
BUN/CREAT SERPL: 16.8 (ref 7–25)
CALCIUM SPEC-SCNC: 9.6 MG/DL (ref 8.4–10.2)
CHLORIDE SERPL-SCNC: 104 MMOL/L (ref 101–112)
CHOLEST SERPL-MCNC: 192 MG/DL (ref 150–200)
CO2 SERPL-SCNC: 32 MMOL/L (ref 22–30)
CREAT BLD-MCNC: 0.95 MG/DL (ref 0.52–1.04)
DEPRECATED RDW RBC AUTO: 41.1 FL (ref 37–54)
EOSINOPHIL # BLD AUTO: 0.41 10*3/MM3 (ref 0–0.4)
EOSINOPHIL NFR BLD AUTO: 7.9 % (ref 0.3–6.2)
ERYTHROCYTE [DISTWIDTH] IN BLOOD BY AUTOMATED COUNT: 12.8 % (ref 12.3–15.4)
FERRITIN SERPL-MCNC: 19.6 NG/ML (ref 13–150)
GFR SERPL CREATININE-BSD FRML MDRD: 58 ML/MIN/1.73 (ref 39–90)
GLOBULIN UR ELPH-MCNC: 3.6 GM/DL (ref 2.3–3.5)
GLUCOSE BLD-MCNC: 90 MG/DL (ref 70–99)
HCT VFR BLD AUTO: 37.4 % (ref 34–46.6)
HDLC SERPL-MCNC: 76 MG/DL (ref 40–59)
HGB BLD-MCNC: 12 G/DL (ref 12–15.9)
LDLC SERPL CALC-MCNC: 101 MG/DL
LDLC/HDLC SERPL: 1.33 {RATIO} (ref 0–3.22)
LYMPHOCYTES # BLD AUTO: 1.81 10*3/MM3 (ref 0.7–3.1)
LYMPHOCYTES NFR BLD AUTO: 34.7 % (ref 19.6–45.3)
MCH RBC QN AUTO: 28.7 PG (ref 26.6–33)
MCHC RBC AUTO-ENTMCNC: 32.1 G/DL (ref 31.5–35.7)
MCV RBC AUTO: 89.5 FL (ref 79–97)
MONOCYTES # BLD AUTO: 0.53 10*3/MM3 (ref 0.1–0.9)
MONOCYTES NFR BLD AUTO: 10.2 % (ref 5–12)
NEUTROPHILS # BLD AUTO: 2.41 10*3/MM3 (ref 1.7–7)
NEUTROPHILS NFR BLD AUTO: 46.1 % (ref 42.7–76)
PLATELET # BLD AUTO: 260 10*3/MM3 (ref 140–450)
PMV BLD AUTO: 9.4 FL (ref 6–12)
POTASSIUM BLD-SCNC: 4.6 MMOL/L (ref 3.4–5)
PROT SERPL-MCNC: 7.7 G/DL (ref 6.3–8.6)
RBC # BLD AUTO: 4.18 10*6/MM3 (ref 3.77–5.28)
SODIUM BLD-SCNC: 140 MMOL/L (ref 137–145)
TRIGL SERPL-MCNC: 74 MG/DL
VIT B12 BLD-MCNC: 613 PG/ML (ref 211–946)
VLDLC SERPL-MCNC: 14.8 MG/DL
WBC NRBC COR # BLD: 5.22 10*3/MM3 (ref 3.4–10.8)

## 2020-02-25 PROCEDURE — 36415 COLL VENOUS BLD VENIPUNCTURE: CPT | Performed by: INTERNAL MEDICINE

## 2020-02-25 PROCEDURE — 85025 COMPLETE CBC W/AUTO DIFF WBC: CPT | Performed by: INTERNAL MEDICINE

## 2020-02-25 PROCEDURE — 82728 ASSAY OF FERRITIN: CPT | Performed by: INTERNAL MEDICINE

## 2020-02-25 PROCEDURE — 80061 LIPID PANEL: CPT | Performed by: INTERNAL MEDICINE

## 2020-02-25 PROCEDURE — 80053 COMPREHEN METABOLIC PANEL: CPT | Performed by: INTERNAL MEDICINE

## 2020-02-25 PROCEDURE — 82306 VITAMIN D 25 HYDROXY: CPT | Performed by: INTERNAL MEDICINE

## 2020-02-25 PROCEDURE — 82607 VITAMIN B-12: CPT | Performed by: INTERNAL MEDICINE

## 2020-03-02 ENCOUNTER — OFFICE VISIT (OUTPATIENT)
Dept: FAMILY MEDICINE CLINIC | Facility: CLINIC | Age: 75
End: 2020-03-02

## 2020-03-02 VITALS
HEIGHT: 62 IN | WEIGHT: 107.6 LBS | BODY MASS INDEX: 19.8 KG/M2 | DIASTOLIC BLOOD PRESSURE: 80 MMHG | HEART RATE: 68 BPM | TEMPERATURE: 97.6 F | SYSTOLIC BLOOD PRESSURE: 160 MMHG

## 2020-03-02 DIAGNOSIS — M81.6 LOCALIZED OSTEOPOROSIS WITHOUT CURRENT PATHOLOGICAL FRACTURE: Chronic | ICD-10-CM

## 2020-03-02 DIAGNOSIS — K21.9 GASTROESOPHAGEAL REFLUX DISEASE WITHOUT ESOPHAGITIS: Chronic | ICD-10-CM

## 2020-03-02 DIAGNOSIS — J43.2 CENTRILOBULAR EMPHYSEMA (HCC): Chronic | ICD-10-CM

## 2020-03-02 DIAGNOSIS — I10 ESSENTIAL (PRIMARY) HYPERTENSION: Primary | Chronic | ICD-10-CM

## 2020-03-02 DIAGNOSIS — J45.20 MILD INTERMITTENT ASTHMA WITHOUT COMPLICATION: Chronic | ICD-10-CM

## 2020-03-02 DIAGNOSIS — E78.00 HYPERCHOLESTEROLEMIA: Chronic | ICD-10-CM

## 2020-03-02 DIAGNOSIS — E55.9 VITAMIN D DEFICIENCY: Chronic | ICD-10-CM

## 2020-03-02 DIAGNOSIS — D51.9 ANEMIA DUE TO VITAMIN B12 DEFICIENCY, UNSPECIFIED B12 DEFICIENCY TYPE: Chronic | ICD-10-CM

## 2020-03-02 DIAGNOSIS — J06.9 VIRAL URI WITH COUGH: ICD-10-CM

## 2020-03-02 PROCEDURE — 99214 OFFICE O/P EST MOD 30 MIN: CPT | Performed by: INTERNAL MEDICINE

## 2020-03-02 RX ORDER — FLUTICASONE PROPIONATE 50 MCG
1 SPRAY, SUSPENSION (ML) NASAL 2 TIMES DAILY
Qty: 1 BOTTLE | Refills: 11 | Status: SHIPPED | OUTPATIENT
Start: 2020-03-02 | End: 2021-09-27 | Stop reason: SDUPTHER

## 2020-03-02 RX ORDER — LISINOPRIL 20 MG/1
TABLET ORAL
Qty: 90 TABLET | Refills: 3 | Status: SHIPPED | OUTPATIENT
Start: 2020-03-02 | End: 2021-03-01

## 2020-03-02 RX ORDER — ALBUTEROL SULFATE 90 UG/1
2 AEROSOL, METERED RESPIRATORY (INHALATION) 4 TIMES DAILY PRN
Qty: 1 INHALER | Refills: 2 | Status: SHIPPED | OUTPATIENT
Start: 2020-03-02 | End: 2022-04-11 | Stop reason: SDUPTHER

## 2020-03-02 NOTE — PROGRESS NOTES
Subjective        History of Present Illness     Court Johnson is a 74 y.o. female who comes in for 6-month follow-up of hypertension, vitamin D deficiency, osteoporosis, anemia due to vitamin B12 deficiency, and other issues.   Denies GERD/gastritis symptoms despite stopping her Zantac.       She reports 1-week history of upper respiratory symptoms including nonproductive cough, worse at night, thin nasal drainage, and postnasal drainage.  She has been taking cough medication.   Her mild COPD symptoms are stable and she denies recent flares of asthma, although, she is currently out of her albuterol and Flonase, which I have asked her to resume.  She is up to date on pneumonia vaccines.      She is due DEXA and mammogram, which are scheduled for later this month( March 24th).  DEXA 02/2018 revealed persistent osteoporosis, improved in the hip, but worse in the lumbar spine.  She continues on calcium/vitamin D supplements and IV Reclast.  Her next IV infusion is scheduled for later this week.      Weight is down 3 pounds in the past six months.  Blood pressure above goal today at 160/80 on her lisinopril 20 mg daily.  She has a cuff at home, but has not been monitoring her BP.  She has not been restricting sodium.  I have asked her to monitor BP daily and keep a diary before we make any dose adjustments.       The patient's relevant past medical, surgical, and social history was reviewed in Epic.   Lab results are reviewed with the patient today.  CBC unremarkable. Normal renal and liver function.  Total cholesterol 192. HDL good at 76.  .  Triglycerides 74.  LDL/HDL ratio 1.33.  Vitamin D and vitamin B-12 at goal.       Review of Systems   Constitutional: Negative for chills, fatigue and fever.   HENT: Positive for congestion and postnasal drip. Negative for ear pain, sinus pressure and sore throat.    Respiratory: Positive for cough. Negative for shortness of breath and wheezing.    Cardiovascular:  "Negative for chest pain, palpitations and leg swelling.   Gastrointestinal: Negative for abdominal pain, blood in stool, constipation, diarrhea, nausea and vomiting.   Endocrine: Negative for cold intolerance, heat intolerance, polydipsia and polyuria.   Genitourinary: Negative for dysuria, frequency, hematuria and urgency.   Skin: Negative for rash.   Neurological: Negative for syncope and weakness.        Objective     Visit Vitals  /80   Pulse 68   Temp 97.6 °F (36.4 °C) (Oral)   Ht 157.5 cm (62\")   Wt 48.8 kg (107 lb 9.6 oz)   BMI 19.68 kg/m²     Physical Exam   Constitutional: She is oriented to person, place, and time. She appears well-developed and well-nourished. No distress.   Very pleasant female.      HENT:   Head: Normocephalic and atraumatic.   Nose: Right sinus exhibits no maxillary sinus tenderness and no frontal sinus tenderness. Left sinus exhibits no maxillary sinus tenderness and no frontal sinus tenderness.   Mouth/Throat: Uvula is midline, oropharynx is clear and moist and mucous membranes are normal. No oral lesions. No tonsillar exudate.   Eyes: Pupils are equal, round, and reactive to light. Conjunctivae and EOM are normal.   Neck: Trachea normal. Neck supple. No JVD present. Carotid bruit is not present. No tracheal deviation present. No thyroid mass and no thyromegaly present.   Cardiovascular: Normal rate, regular rhythm and intact distal pulses.  No extrasystoles are present. PMI is not displaced.   Murmur heard.  2/6 systolic murmur heard best at left sternal border.     Pulmonary/Chest: Effort normal and breath sounds normal. No accessory muscle usage. No respiratory distress. She has no decreased breath sounds. She has no wheezes. She has no rhonchi. She has no rales.   Diminished excursion on expiratory phase of cough.  Clear postnasal drip.    Abdominal: Soft. Bowel sounds are normal. She exhibits no distension. There is no hepatosplenomegaly. There is no tenderness. "   Musculoskeletal:   Kyphotic curvature of thoracic spine.      Vascular Status -  Her right foot exhibits normal foot vasculature  and no edema. Her left foot exhibits normal foot vasculature  and no edema.  Lymphadenopathy:     She has no cervical adenopathy.   Neurological: She is alert and oriented to person, place, and time. No cranial nerve deficit. Coordination normal.   Skin: Skin is warm, dry and intact. No rash noted. No cyanosis. Nails show no clubbing.   Psychiatric: She has a normal mood and affect. Her speech is normal and behavior is normal. Judgment and thought content normal.   Vitals reviewed.    Future Appointments   Date Time Provider Department Center   3/6/2020  1:30 PM  [facility] OP INFU CHAIR 10  [facility] OPI [facility]   3/24/2020 10:00 AM [facility] PWD MAMM 1 [facility] [place]   3/24/2020 10:30 AM [facility] PWD DEXA 1 [facility] DEXA [facility] [place]   9/8/2020 10:30 AM Andres Heart MD [facility] PC P[facility] None         Assessment/Plan      For the new problem of viral URI symptoms with cough, a refill is sent for albuterol inhaler to use 1-2 hours prior to bedtime to decrease nighttime cough. Resume the Flonase one spray each nostril b.i.d.  Refill prescription is sent.  Continue to use the OTC cough medication as needed.    Continue lisinopril 10 mg daily.  Recommended sodium restriction.  Monitor BP daily at rest and keep a diary. Notify me if systolic BP is consistently greater than 140.        Keep the appointment later this week for IV Reclast.  Continue the calcium/vitamin D supplements.  She is scheduled repeat DEXA 02/2020.    Continue diet control of her hyperlipidemia.  Cholesterol ratio is still very acceptable.    Continue the vitamin B12 supplement.    Continue other medications and vitamin and mineral supplements to treat additional medical problems which we addressed today.       Return in six months for follow up.        Scribed for Dr. Heart by Olesya Bruno [facility].     Diagnoses and all orders for this  visit:    Essential (primary) hypertension  -     CBC Auto Differential; Future  -     Comprehensive Metabolic Panel; Future    Hypercholesterolemia  -     TSH; Future    Centrilobular emphysema (CMS/HCC)    Mild intermittent asthma without complication    Gastroesophageal reflux disease without esophagitis    Anemia due to vitamin B12 deficiency, unspecified B12 deficiency type  -     Vitamin B12; Future    Vitamin D deficiency  -     Vitamin D 25 Hydroxy; Future    Localized osteoporosis without current pathological fracture    Viral URI with cough    Other orders  -     lisinopril (PRINIVIL,ZESTRIL) 20 MG tablet; TAKE 1 TABLET BY MOUTH ONCE DAILY  -     albuterol sulfate HFA (PROAIR HFA) 108 (90 Base) MCG/ACT inhaler; Inhale 2 puffs 4 (Four) Times a Day As Needed for Shortness of Air (cough). 90 mcg/actuation  -     fluticasone (FLONASE) 50 MCG/ACT nasal spray; 1 spray into the nostril(s) as directed by provider 2 (Two) Times a Day.        Lab on 02/25/2020   Component Date Value Ref Range Status   • WBC 02/25/2020 5.22  3.40 - 10.80 10*3/mm3 Final   • RBC 02/25/2020 4.18  3.77 - 5.28 10*6/mm3 Final   • Hemoglobin 02/25/2020 12.0  12.0 - 15.9 g/dL Final   • Hematocrit 02/25/2020 37.4  34.0 - 46.6 % Final   • MCV 02/25/2020 89.5  79.0 - 97.0 fL Final   • MCH 02/25/2020 28.7  26.6 - 33.0 pg Final   • MCHC 02/25/2020 32.1  31.5 - 35.7 g/dL Final   • RDW 02/25/2020 12.8  12.3 - 15.4 % Final   • RDW-SD 02/25/2020 41.1  37.0 - 54.0 fl Final   • MPV 02/25/2020 9.4  6.0 - 12.0 fL Final   • Platelets 02/25/2020 260  140 - 450 10*3/mm3 Final   • Neutrophil % 02/25/2020 46.1  42.7 - 76.0 % Final   • Lymphocyte % 02/25/2020 34.7  19.6 - 45.3 % Final   • Monocyte % 02/25/2020 10.2  5.0 - 12.0 % Final   • Eosinophil % 02/25/2020 7.9* 0.3 - 6.2 % Final   • Basophil % 02/25/2020 1.1  0.0 - 1.5 % Final   • Neutrophils, Absolute 02/25/2020 2.41  1.70 - 7.00 10*3/mm3 Final   • Lymphocytes, Absolute 02/25/2020 1.81  0.70 - 3.10  10*3/mm3 Final   • Monocytes, Absolute 02/25/2020 0.53  0.10 - 0.90 10*3/mm3 Final   • Eosinophils, Absolute 02/25/2020 0.41* 0.00 - 0.40 10*3/mm3 Final   • Basophils, Absolute 02/25/2020 0.06  0.00 - 0.20 10*3/mm3 Final   • Glucose 02/25/2020 90  70 - 99 mg/dL Final   • BUN 02/25/2020 16  7 - 23 mg/dL Final   • Creatinine 02/25/2020 0.95  0.52 - 1.04 mg/dL Final   • Sodium 02/25/2020 140  137 - 145 mmol/L Final   • Potassium 02/25/2020 4.6  3.4 - 5.0 mmol/L Final   • Chloride 02/25/2020 104  101 - 112 mmol/L Final   • CO2 02/25/2020 32.0* 22.0 - 30.0 mmol/L Final   • Calcium 02/25/2020 9.6  8.4 - 10.2 mg/dL Final   • Total Protein 02/25/2020 7.7  6.3 - 8.6 g/dL Final   • Albumin 02/25/2020 4.10  3.50 - 5.00 g/dL Final   • ALT (SGPT) 02/25/2020 11  <=35 U/L Final   • AST (SGOT) 02/25/2020 21  14 - 36 U/L Final   • Alkaline Phosphatase 02/25/2020 80  38 - 126 U/L Final   • Total Bilirubin 02/25/2020 0.5  0.2 - 1.3 mg/dL Final   • eGFR Non African Amer 02/25/2020 58  39 - 90 mL/min/1.73 Final   • Globulin 02/25/2020 3.6* 2.3 - 3.5 gm/dL Final   • A/G Ratio 02/25/2020 1.1  1.1 - 1.8 g/dL Final   • BUN/Creatinine Ratio 02/25/2020 16.8  7.0 - 25.0 Final   • Anion Gap 02/25/2020 4.0* 5.0 - 15.0 mmol/L Final   • Ferritin 02/25/2020 19.60  13.00 - 150.00 ng/mL Final   • Total Cholesterol 02/25/2020 192  150 - 200 mg/dL Final   • Triglycerides 02/25/2020 74  <=150 mg/dL Final   • HDL Cholesterol 02/25/2020 76* 40 - 59 mg/dL Final   • LDL Cholesterol  02/25/2020 101* <=100 mg/dL Final   • VLDL Cholesterol 02/25/2020 14.8  mg/dL Final   • LDL/HDL Ratio 02/25/2020 1.33  0.00 - 3.22 Final   • Vitamin B-12 02/25/2020 613  211 - 946 pg/mL Final   • 25 Hydroxy, Vitamin D 02/25/2020 48.1  30.0 - 100.0 ng/ml Final   ]

## 2020-03-06 ENCOUNTER — APPOINTMENT (OUTPATIENT)
Dept: ONCOLOGY | Facility: HOSPITAL | Age: 75
End: 2020-03-06

## 2020-03-09 DIAGNOSIS — M81.6 LOCALIZED OSTEOPOROSIS WITHOUT CURRENT PATHOLOGICAL FRACTURE: Primary | ICD-10-CM

## 2020-03-10 ENCOUNTER — APPOINTMENT (OUTPATIENT)
Dept: PEDIATRICS | Facility: HOSPITAL | Age: 75
End: 2020-03-10

## 2020-03-10 ENCOUNTER — APPOINTMENT (OUTPATIENT)
Dept: INFUSION THERAPY | Facility: HOSPITAL | Age: 75
End: 2020-03-10

## 2020-03-16 RX ORDER — SODIUM CHLORIDE 9 MG/ML
250 INJECTION, SOLUTION INTRAVENOUS ONCE
Status: CANCELLED | OUTPATIENT
Start: 2020-03-16

## 2020-03-17 ENCOUNTER — APPOINTMENT (OUTPATIENT)
Dept: INFUSION THERAPY | Facility: HOSPITAL | Age: 75
End: 2020-03-17

## 2020-07-06 ENCOUNTER — INFUSION (OUTPATIENT)
Dept: ONCOLOGY | Facility: HOSPITAL | Age: 75
End: 2020-07-06

## 2020-07-06 VITALS
SYSTOLIC BLOOD PRESSURE: 142 MMHG | TEMPERATURE: 97.6 F | RESPIRATION RATE: 18 BRPM | HEART RATE: 91 BPM | DIASTOLIC BLOOD PRESSURE: 78 MMHG

## 2020-07-06 DIAGNOSIS — M81.6 LOCALIZED OSTEOPOROSIS WITHOUT CURRENT PATHOLOGICAL FRACTURE: Primary | ICD-10-CM

## 2020-07-06 PROCEDURE — 96365 THER/PROPH/DIAG IV INF INIT: CPT | Performed by: NURSE PRACTITIONER

## 2020-07-06 PROCEDURE — 25010000002 ZOLEDRONIC ACID 5 MG/100ML SOLUTION: Performed by: NURSE PRACTITIONER

## 2020-07-06 RX ORDER — SODIUM CHLORIDE 9 MG/ML
250 INJECTION, SOLUTION INTRAVENOUS ONCE
Status: COMPLETED | OUTPATIENT
Start: 2020-07-06 | End: 2020-07-06

## 2020-07-06 RX ORDER — ZOLEDRONIC ACID 5 MG/100ML
5 INJECTION, SOLUTION INTRAVENOUS ONCE
Status: CANCELLED | OUTPATIENT
Start: 2021-07-06

## 2020-07-06 RX ORDER — ZOLEDRONIC ACID 5 MG/100ML
5 INJECTION, SOLUTION INTRAVENOUS ONCE
Status: COMPLETED | OUTPATIENT
Start: 2020-07-06 | End: 2020-07-06

## 2020-07-06 RX ORDER — SODIUM CHLORIDE 9 MG/ML
250 INJECTION, SOLUTION INTRAVENOUS ONCE
Status: CANCELLED | OUTPATIENT
Start: 2021-07-06

## 2020-07-06 RX ADMIN — ZOLEDRONIC ACID 5 MG: 5 INJECTION, SOLUTION INTRAVENOUS at 10:04

## 2020-07-06 RX ADMIN — SODIUM CHLORIDE 250 ML: 9 INJECTION, SOLUTION INTRAVENOUS at 10:01

## 2020-07-13 NOTE — TELEPHONE ENCOUNTER
I have personally seen, evaluated,and participated in the management of Fernando Lyle.  I reviewed and agree with the PA/NP's assessment and plan of care with the following exceptions: None    My examination, assessment, and plan of care for Fernando Lyle:  44-year-old left-handed male presented with right thumb pain for the past 3 weeks.  Patient syncopized 3 weeks ago and was hospitalized for 4 days and [facility].  Patient stated that he complained about right thumb pain but he was never evaluated no receive x-rays.  Has noticed continual swelling and pain over the right first MCP extending down to the PIP.  Exam:   Vitals:    07/13/20 1739   BP: 129/80   Pulse: 97   Resp: 16   Temp: 36.5 °C (97.7 °F)   SpO2: 97%      Mildly swollen right first MCP with tenderness with minimal swelling noticeable extending down to the PIP.  Nontender first PIP.  Strong AB adduction along with extension and flexion of the right thumb.  Plan:  X-rays thumb Nick Bowden MD  07/13/20 3816     Refill Lisinopril. TP

## 2020-08-12 VITALS — HEIGHT: 62 IN | BODY MASS INDEX: 19.69 KG/M2 | WEIGHT: 107 LBS

## 2020-08-12 NOTE — PATIENT INSTRUCTIONS
Medicare Wellness  Personal Prevention Plan of Service     Date of Office Visit:  2020  Encounter Provider:  Andres Heart MD  Place of Service:  [facility]  Patient Name: Court Johnson  :  1945    As part of the Medicare Wellness portion of your visit today, we are providing you with this personalized preventive plan of services (PPPS). This plan is based upon recommendations of the [place] Preventive Services Task Force (USPSTF) and the Advisory Committee on Immunization Practices (ACIP).    This lists the preventive care services that should be considered, and provides dates of when you are due. Items listed as completed are up-to-date and do not require any further intervention.    Health Maintenance   Topic Date Due   • TDAP/TD VACCINES (1 - Tdap) 1956   • ZOSTER VACCINE (2 of 3) 10/23/2007   • HEPATITIS C SCREENING  08/15/2016   • DXA SCAN  2020   • INFLUENZA VACCINE  2020   • MEDICARE ANNUAL WELLNESS  2020   • LIPID PANEL  2021   • MAMMOGRAM  2021   • COLONOSCOPY  2021   • Pneumococcal Vaccine Once at 65 Years Old  Completed       No orders of the defined types were placed in this encounter.      Return in about 1 year (around 2021) for Medicare Wellness.

## 2020-08-12 NOTE — PROGRESS NOTES
The ABCs of the Annual Wellness Visit  Subsequent Medicare Wellness Visit    Chief Complaint   Patient presents with   • Medicare Wellness-subsequent       Subjective   History of Present Illness:  Court Johnson is a 75 y.o. female who presents for a Subsequent Medicare Wellness Visit. You have chosen to receive care through a telephone visit. Do you consent to use a telephone visit for your medical care today? Yes    HEALTH RISK ASSESSMENT    Recent Hospitalizations:  No hospitalization(s) within the last year.    Current Medical Providers:  Patient Care Team:  Andres Heart MD as PCP - General (Internal Medicine)    Smoking Status:  Social History     Tobacco Use   Smoking Status Never Smoker   Smokeless Tobacco Never Used       Alcohol Consumption:  Social History     Substance and Sexual Activity   Alcohol Use No       Depression Screen:   PHQ-2/PHQ-9 Depression Screening 8/12/2020   Little interest or pleasure in doing things 0   Feeling down, depressed, or hopeless 0   Total Score 0       Fall Risk Screen:  JANELLEADI Fall Risk Assessment was completed, and patient is at LOW risk for falls.Assessment completed on:8/12/2020    Health Habits and Functional and Cognitive Screening:  Functional & Cognitive Status 8/12/2020   Do you have difficulty preparing food and eating? No   Do you have difficulty bathing yourself, getting dressed or grooming yourself? No   Do you have difficulty using the toilet? No   Do you have difficulty moving around from place to place? No   Do you have trouble with steps or getting out of a bed or a chair? No   Current Diet Well Balanced Diet   Dental Exam Up to date   Eye Exam Up to date   Exercise (times per week) 5 times per week   Current Exercise Activities Include Housecleaning   Do you need help using the phone?  No   Are you deaf or do you have serious difficulty hearing?  No   Do you need help with transportation? No   Do you need help shopping? No   Do you need help  preparing meals?  No   Do you need help with housework?  No   Do you need help with laundry? No   Do you need help taking your medications? No   Do you need help managing money? No   Do you ever drive or ride in a car without wearing a seat belt? No   Have you felt unusual stress, anger or loneliness in the last month? No   Who do you live with? Spouse   If you need help, do you have trouble finding someone available to you? No   Have you been bothered in the last four weeks by sexual problems? No   Do you have difficulty concentrating, remembering or making decisions? No         Does the patient have evidence of cognitive impairment? No    Asprin use counseling:Does not need ASA (and currently is not on it)    Age-appropriate Screening Schedule:  Refer to the list below for future screening recommendations based on patient's age, sex and/or medical conditions. Orders for these recommended tests are listed in the plan section. The patient has been provided with a written plan.    Health Maintenance   Topic Date Due   • TDAP/TD VACCINES (1 - Tdap) 04/02/1956   • ZOSTER VACCINE (2 of 3) 10/23/2007   • DXA SCAN  02/13/2020   • INFLUENZA VACCINE  08/01/2020   • LIPID PANEL  02/25/2021   • MAMMOGRAM  03/18/2021   • COLONOSCOPY  04/01/2021          The following portions of the patient's history were reviewed and updated as appropriate:   She  has a past medical history of Allergic rhinitis, seasonal, Aptyalism, Cervical disc disorder, Chronic obstructive lung disease (CMS/[facility]), Closed fracture of ankle (08/2015), Degenerative joint disease involving multiple joints, Dupuytren's disease, Essential (primary) hypertension, GERD (gastroesophageal reflux disease), Glossitis, Hypercholesterolemia, Hyperthyroidism, Impaired fasting glucose, Mild intermittent asthma, Mitral valve regurgitation, Osteoarthritis of multiple joints, Osteoporosis, Pneumonia, Tricuspid valve incompetence, non-rheumatic, Venous varices, Vitamin B12  deficiency anemia, and Vitamin D deficiency.  She does not have any pertinent problems on file.  She  has a past surgical history that includes Pap Smear (2012);  section; and Ankle surgery.  Her family history includes Breast cancer in her paternal aunt; Cancer in an other family member; Diabetes in her brother.  She  reports that she has never smoked. She has never used smokeless tobacco. She reports that she does not drink alcohol or use drugs.  Current Outpatient Medications   Medication Sig Dispense Refill   • albuterol sulfate HFA (PROAIR HFA) 108 (90 Base) MCG/ACT inhaler Inhale 2 puffs 4 (Four) Times a Day As Needed for Shortness of Air (cough). 90 mcg/actuation 1 inhaler 2   • Calcium Carb-Cholecalciferol (CALCIUM CARBONATE-VITAMIN D3) 600-400 MG-UNIT tablet Take 2 tablets by mouth daily.     • fluticasone (FLONASE) 50 MCG/ACT nasal spray 1 spray into the nostril(s) as directed by provider 2 (Two) Times a Day. 1 bottle 11   • lisinopril (PRINIVIL,ZESTRIL) 20 MG tablet TAKE 1 TABLET BY MOUTH ONCE DAILY 90 tablet 3   • meloxicam (MOBIC) 15 MG tablet Take 1 tablet by mouth Daily As Needed (pain). Take with food 15 tablet 0   • montelukast (SINGULAIR) 10 MG tablet Take 1 tablet by mouth Every Night. 30 tablet 0   • raNITIdine (ZANTAC) 300 MG tablet Take 1 tablet by mouth Every Night. 30 tablet 11   • vitamin B-12 (CYANOCOBALAMIN) 500 MCG tablet Take 500 mcg by mouth Daily.     • Zoledronic Acid (RECLAST IV) Infuse  into a venous catheter. annually       No current facility-administered medications for this visit.      Current Outpatient Medications on File Prior to Visit   Medication Sig   • albuterol sulfate HFA (PROAIR HFA) 108 (90 Base) MCG/ACT inhaler Inhale 2 puffs 4 (Four) Times a Day As Needed for Shortness of Air (cough). 90 mcg/actuation   • Calcium Carb-Cholecalciferol (CALCIUM CARBONATE-VITAMIN D3) 600-400 MG-UNIT tablet Take 2 tablets by mouth daily.   • fluticasone (FLONASE) 50 MCG/ACT  nasal spray 1 spray into the nostril(s) as directed by provider 2 (Two) Times a Day.   • lisinopril (PRINIVIL,ZESTRIL) 20 MG tablet TAKE 1 TABLET BY MOUTH ONCE DAILY   • meloxicam (MOBIC) 15 MG tablet Take 1 tablet by mouth Daily As Needed (pain). Take with food   • montelukast (SINGULAIR) 10 MG tablet Take 1 tablet by mouth Every Night.   • raNITIdine (ZANTAC) 300 MG tablet Take 1 tablet by mouth Every Night.   • vitamin B-12 (CYANOCOBALAMIN) 500 MCG tablet Take 500 mcg by mouth Daily.   • Zoledronic Acid (RECLAST IV) Infuse  into a venous catheter. annually     No current facility-administered medications on file prior to visit.      She is allergic to boniva [ibandronic acid] and fosamax [alendronate]..    Outpatient Medications Prior to Visit   Medication Sig Dispense Refill   • albuterol sulfate HFA (PROAIR HFA) 108 (90 Base) MCG/ACT inhaler Inhale 2 puffs 4 (Four) Times a Day As Needed for Shortness of Air (cough). 90 mcg/actuation 1 inhaler 2   • Calcium Carb-Cholecalciferol (CALCIUM CARBONATE-VITAMIN D3) 600-400 MG-UNIT tablet Take 2 tablets by mouth daily.     • fluticasone (FLONASE) 50 MCG/ACT nasal spray 1 spray into the nostril(s) as directed by provider 2 (Two) Times a Day. 1 bottle 11   • lisinopril (PRINIVIL,ZESTRIL) 20 MG tablet TAKE 1 TABLET BY MOUTH ONCE DAILY 90 tablet 3   • meloxicam (MOBIC) 15 MG tablet Take 1 tablet by mouth Daily As Needed (pain). Take with food 15 tablet 0   • montelukast (SINGULAIR) 10 MG tablet Take 1 tablet by mouth Every Night. 30 tablet 0   • raNITIdine (ZANTAC) 300 MG tablet Take 1 tablet by mouth Every Night. 30 tablet 11   • vitamin B-12 (CYANOCOBALAMIN) 500 MCG tablet Take 500 mcg by mouth Daily.     • Zoledronic Acid (RECLAST IV) Infuse  into a venous catheter. annually       No facility-administered medications prior to visit.        Patient Active Problem List   Diagnosis   • Vitamin D deficiency   • Venous varices   • Tricuspid valve incompetence, non-rheumatic  "  • Seborrheic keratosis   • Pneumonia   • Osteoporosis   • Osteoarthritis of multiple joints   • Mitral valve regurgitation   • Mild intermittent asthma   • Hypercholesterolemia   • GERD (gastroesophageal reflux disease)   • Essential (primary) hypertension   • Dupuytren's disease   • Degenerative joint disease involving multiple joints   • Vitamin B12 deficiency anemia   • Closed fracture of ankle   • Chronic obstructive lung disease (CMS/[facility])   • Cervical disc disorder   • Aptyalism   • Allergic rhinitis, seasonal       Advanced Care Planning:  ACP discussion was held with the patient during this visit. Patient does not have an advance directive, information provided.    Review of Systems    Compared to one year ago, the patient feels her physical health is the same.  Compared to one year ago, the patient feels her mental health is the same.    Reviewed chart for potential of high risk medication in the elderly: yes  Reviewed chart for potential of harmful drug interactions in the elderly:yes    Objective         Vitals:    08/12/20 1322   Weight: 48.5 kg (107 lb)   Height: 157.5 cm (62\")   PainSc: 0-No pain       Body mass index is 19.57 kg/m².  Discussed the patient's BMI with her. The BMI is in the acceptable range.    Physical Exam          Assessment/Plan   Medicare Risks and Personalized Health Plan  CMS Preventative Services Quick Reference  Advance Directive Discussion  Chronic Pain   Immunizations Discussed/Encouraged (specific immunizations; adacel Tdap and Shingrix )    The above risks/problems have been discussed with the patient.  The patient will come in for Tdap vaccination soon.  We will send a prescription for Shingrix vaccine to [facility].  I will screen for hepatitis C with her upcoming labs.  Pertinent information has been shared with the patient in the After Visit Summary.  Follow up plans and orders are seen below in the Assessment/Plan Section.    Diagnoses and all orders for this " visit:    1. Medicare annual wellness visit, subsequent (Primary)    2. Encounter for hepatitis C screening test for low risk patient  -     Hepatitis C Antibody; Future    3. Need for diphtheria-tetanus-pertussis (Tdap) vaccine    4. Need for shingles vaccine      Follow Up:  Return in about 1 year (around 8/13/2021) for Medicare Wellness.     An After Visit Summary and PPPS were given to the patient.

## 2020-08-13 ENCOUNTER — OFFICE VISIT (OUTPATIENT)
Dept: FAMILY MEDICINE CLINIC | Facility: CLINIC | Age: 75
End: 2020-08-13

## 2020-08-13 DIAGNOSIS — Z11.59 ENCOUNTER FOR HEPATITIS C SCREENING TEST FOR LOW RISK PATIENT: ICD-10-CM

## 2020-08-13 DIAGNOSIS — Z00.00 MEDICARE ANNUAL WELLNESS VISIT, SUBSEQUENT: Primary | ICD-10-CM

## 2020-08-13 DIAGNOSIS — Z23 NEED FOR SHINGLES VACCINE: ICD-10-CM

## 2020-08-13 DIAGNOSIS — Z23 NEED FOR DIPHTHERIA-TETANUS-PERTUSSIS (TDAP) VACCINE: ICD-10-CM

## 2020-08-13 PROCEDURE — G0439 PPPS, SUBSEQ VISIT: HCPCS | Performed by: INTERNAL MEDICINE

## 2020-09-01 ENCOUNTER — LAB (OUTPATIENT)
Dept: LAB | Facility: OTHER | Age: 75
End: 2020-09-01

## 2020-09-01 ENCOUNTER — CLINICAL SUPPORT (OUTPATIENT)
Dept: FAMILY MEDICINE CLINIC | Facility: CLINIC | Age: 75
End: 2020-09-01

## 2020-09-01 DIAGNOSIS — I10 ESSENTIAL (PRIMARY) HYPERTENSION: Chronic | ICD-10-CM

## 2020-09-01 DIAGNOSIS — E78.00 HYPERCHOLESTEROLEMIA: Chronic | ICD-10-CM

## 2020-09-01 DIAGNOSIS — Z23 NEED FOR DIPHTHERIA-TETANUS-PERTUSSIS (TDAP) VACCINE: Primary | ICD-10-CM

## 2020-09-01 DIAGNOSIS — D51.9 ANEMIA DUE TO VITAMIN B12 DEFICIENCY, UNSPECIFIED B12 DEFICIENCY TYPE: Chronic | ICD-10-CM

## 2020-09-01 DIAGNOSIS — E55.9 VITAMIN D DEFICIENCY: Chronic | ICD-10-CM

## 2020-09-01 DIAGNOSIS — Z11.59 ENCOUNTER FOR HEPATITIS C SCREENING TEST FOR LOW RISK PATIENT: ICD-10-CM

## 2020-09-01 LAB
ALBUMIN SERPL-MCNC: 4.3 G/DL (ref 3.5–5)
ALBUMIN/GLOB SERPL: 1.2 G/DL (ref 1.1–1.8)
ALP SERPL-CCNC: 88 U/L (ref 38–126)
ALT SERPL W P-5'-P-CCNC: 15 U/L
ANION GAP SERPL CALCULATED.3IONS-SCNC: 6 MMOL/L (ref 5–15)
AST SERPL-CCNC: 27 U/L (ref 14–36)
BASOPHILS # BLD AUTO: 0.03 10*3/MM3 (ref 0–0.2)
BASOPHILS NFR BLD AUTO: 0.7 % (ref 0–1.5)
BILIRUB SERPL-MCNC: 0.6 MG/DL (ref 0.2–1.3)
BUN SERPL-MCNC: 16 MG/DL (ref 7–23)
BUN/CREAT SERPL: 18.6 (ref 7–25)
CALCIUM SPEC-SCNC: 9.3 MG/DL (ref 8.4–10.2)
CHLORIDE SERPL-SCNC: 104 MMOL/L (ref 101–112)
CO2 SERPL-SCNC: 29 MMOL/L (ref 22–30)
CREAT SERPL-MCNC: 0.86 MG/DL (ref 0.52–1.04)
DEPRECATED RDW RBC AUTO: 42.2 FL (ref 37–54)
EOSINOPHIL # BLD AUTO: 0.5 10*3/MM3 (ref 0–0.4)
EOSINOPHIL NFR BLD AUTO: 11.8 % (ref 0.3–6.2)
ERYTHROCYTE [DISTWIDTH] IN BLOOD BY AUTOMATED COUNT: 12.9 % (ref 12.3–15.4)
GFR SERPL CREATININE-BSD FRML MDRD: 64 ML/MIN/1.73 (ref 39–90)
GLOBULIN UR ELPH-MCNC: 3.5 GM/DL (ref 2.3–3.5)
GLUCOSE SERPL-MCNC: 93 MG/DL (ref 70–99)
HCT VFR BLD AUTO: 37.3 % (ref 34–46.6)
HGB BLD-MCNC: 12.2 G/DL (ref 12–15.9)
LYMPHOCYTES # BLD AUTO: 1.62 10*3/MM3 (ref 0.7–3.1)
LYMPHOCYTES NFR BLD AUTO: 38.3 % (ref 19.6–45.3)
MCH RBC QN AUTO: 29.7 PG (ref 26.6–33)
MCHC RBC AUTO-ENTMCNC: 32.7 G/DL (ref 31.5–35.7)
MCV RBC AUTO: 90.8 FL (ref 79–97)
MONOCYTES # BLD AUTO: 0.35 10*3/MM3 (ref 0.1–0.9)
MONOCYTES NFR BLD AUTO: 8.3 % (ref 5–12)
NEUTROPHILS NFR BLD AUTO: 1.73 10*3/MM3 (ref 1.7–7)
NEUTROPHILS NFR BLD AUTO: 40.9 % (ref 42.7–76)
PLATELET # BLD AUTO: 241 10*3/MM3 (ref 140–450)
PMV BLD AUTO: 9.7 FL (ref 6–12)
POTASSIUM SERPL-SCNC: 4.8 MMOL/L (ref 3.4–5)
PROT SERPL-MCNC: 7.8 G/DL (ref 6.3–8.6)
RBC # BLD AUTO: 4.11 10*6/MM3 (ref 3.77–5.28)
SODIUM SERPL-SCNC: 139 MMOL/L (ref 137–145)
WBC # BLD AUTO: 4.23 10*3/MM3 (ref 3.4–10.8)

## 2020-09-01 PROCEDURE — 90471 IMMUNIZATION ADMIN: CPT | Performed by: INTERNAL MEDICINE

## 2020-09-01 PROCEDURE — 82306 VITAMIN D 25 HYDROXY: CPT | Performed by: INTERNAL MEDICINE

## 2020-09-01 PROCEDURE — 86803 HEPATITIS C AB TEST: CPT | Performed by: INTERNAL MEDICINE

## 2020-09-01 PROCEDURE — 82607 VITAMIN B-12: CPT | Performed by: INTERNAL MEDICINE

## 2020-09-01 PROCEDURE — 80053 COMPREHEN METABOLIC PANEL: CPT | Performed by: INTERNAL MEDICINE

## 2020-09-01 PROCEDURE — 84443 ASSAY THYROID STIM HORMONE: CPT | Performed by: INTERNAL MEDICINE

## 2020-09-01 PROCEDURE — 90715 TDAP VACCINE 7 YRS/> IM: CPT | Performed by: INTERNAL MEDICINE

## 2020-09-01 PROCEDURE — 85025 COMPLETE CBC W/AUTO DIFF WBC: CPT | Performed by: INTERNAL MEDICINE

## 2020-09-01 PROCEDURE — 36415 COLL VENOUS BLD VENIPUNCTURE: CPT | Performed by: INTERNAL MEDICINE

## 2020-09-02 LAB
25(OH)D3 SERPL-MCNC: 53.6 NG/ML (ref 30–100)
HCV AB SER DONR QL: NORMAL
TSH SERPL DL<=0.05 MIU/L-ACNC: 0.81 UIU/ML (ref 0.27–4.2)
VIT B12 BLD-MCNC: 629 PG/ML (ref 211–946)

## 2020-09-08 ENCOUNTER — OFFICE VISIT (OUTPATIENT)
Dept: FAMILY MEDICINE CLINIC | Facility: CLINIC | Age: 75
End: 2020-09-08

## 2020-09-08 VITALS — HEIGHT: 62 IN | BODY MASS INDEX: 19.69 KG/M2 | WEIGHT: 107 LBS

## 2020-09-08 DIAGNOSIS — M81.6 LOCALIZED OSTEOPOROSIS WITHOUT CURRENT PATHOLOGICAL FRACTURE: Chronic | ICD-10-CM

## 2020-09-08 DIAGNOSIS — I10 ESSENTIAL (PRIMARY) HYPERTENSION: Primary | Chronic | ICD-10-CM

## 2020-09-08 DIAGNOSIS — J30.1 SEASONAL ALLERGIC RHINITIS DUE TO POLLEN: Chronic | ICD-10-CM

## 2020-09-08 DIAGNOSIS — K21.9 GASTROESOPHAGEAL REFLUX DISEASE WITHOUT ESOPHAGITIS: Chronic | ICD-10-CM

## 2020-09-08 DIAGNOSIS — D51.9 ANEMIA DUE TO VITAMIN B12 DEFICIENCY, UNSPECIFIED B12 DEFICIENCY TYPE: Chronic | ICD-10-CM

## 2020-09-08 DIAGNOSIS — J45.20 MILD INTERMITTENT ASTHMA WITHOUT COMPLICATION: Chronic | ICD-10-CM

## 2020-09-08 DIAGNOSIS — J43.2 CENTRILOBULAR EMPHYSEMA (HCC): Chronic | ICD-10-CM

## 2020-09-08 DIAGNOSIS — E55.9 VITAMIN D DEFICIENCY: Chronic | ICD-10-CM

## 2020-09-08 PROCEDURE — G2025 DIS SITE TELE SVCS RHC/FQHC: HCPCS | Performed by: INTERNAL MEDICINE

## 2020-09-08 NOTE — PROGRESS NOTES
Subjective     You have chosen to receive care through a telephone visit. Do you consent to use a telephone visit for your medical care today? Yes    Total visit time:  23 minutes.      History of Present Illness     Court Johnson is a 75 y.o. female who receives care today via telephone visit for 6-month follow-up of hypertension, vitamin D deficiency, osteoporosis, anemia due to vitamin B12 deficiency, seasonal allergic rhinitis and other issues.  She has mild intermittent asthma/COPD, but feels like her breathing is currently doing well and denies wheezing.  She has an albuterol inhaler to use as needed.  She has noted her allergy symptoms starting to flare, and we reviewed management.  She denies GERD/gastritis symptoms and is currently not requiring an acid reducer.  She was previously using ranitidine prior to recall.        Patient had normal mammogram last week (09/04/2020) as well as DEXA revealing improved density of lumbar spine of 13% and persistent, although, stable osteoporosis of the left femoral neck.  Total hip score was decreased surprisingly.  She continues IV Reclast with her most recent infusion 07/06/2020 as well as calcium and vitamin D supplements.  She could not tolerate oral bisphosphonates.    She reports stable body weight.  She has not checked BP, but does agree to monitor BP at rest episodically and notify us if not at goal.  Blood pressure was above goal with her visit last spring.      She had influenza vaccine at [facility] last week.  She recently received her Tdap vaccination here.  Recommended she get Covid-19 vaccine when available.      The patient's relevant past medical, surgical, and social history was reviewed in Epic.   Lab results are reviewed with the patient today. CBC unremarkable.  Fasting glucose 93.  Vitamin B-12 and vitamin D at goal. Normal renal and liver function.  Negative hepatitis screen.      Review of Systems   Constitutional: Negative for chills, fatigue  "and fever.   HENT: Negative for congestion, ear pain, postnasal drip, sinus pressure and sore throat.    Respiratory: Negative for cough, shortness of breath and wheezing.    Cardiovascular: Negative for chest pain, palpitations and leg swelling.   Gastrointestinal: Negative for abdominal pain, blood in stool, constipation, diarrhea, nausea and vomiting.   Endocrine: Negative for cold intolerance, heat intolerance, polydipsia and polyuria.   Genitourinary: Negative for dysuria, frequency, hematuria and urgency.   Skin: Negative for rash.   Neurological: Negative for syncope and weakness.        Objective     Visit Vitals  Ht 157.5 cm (62\")   Wt 48.5 kg (107 lb)   BMI 19.57 kg/m²     Physical Exam        Assessment/Plan      Continue the vitamin B12 supplement.    Continue diet control of her hyperlipidemia.       Continue the IV Reclast, with next infusion due July 2021.  Continue the calcium/vitamin D supplements.  Fall precautions.  Pursue daily weightbearing exercises.  We will repeat DEXA 09/2022.    Recommended OTC antacids as needed for GERD/gastritis symptoms.       Continue Singulair for mild intermittent asthma and seasonal allergies.  She has albuterol inhaler to use as needed for mild intermittent asthma.   Resume Flonase nasal spray and use of a daily nondrowsy antihistamine, such as Claritin 10 mg daily.    Continue lisinopril 10 mg daily.  Recommended sodium restriction.  Monitor BP episodically at rest and notify me if systolic is consistently above 140.     Recommended Covid-19 vaccine when available.  She had influenza vaccine at [facility] last week.       Continue other medications and vitamin and mineral supplements to treat additional medical problems which we addressed today.        Return in six months for routine follow up with fasting labs one week prior or sooner if needed.         Scribed for Dr. Herat by Olesya Bruno [facility].     Diagnoses and all orders for this visit:    Essential " (primary) hypertension  -     CBC Auto Differential; Future  -     Comprehensive Metabolic Panel; Future  -     Lipid Panel; Future    Centrilobular emphysema (CMS/HCC)  -     CBC Auto Differential; Future    Seasonal allergic rhinitis due to pollen    Mild intermittent asthma without complication    Gastroesophageal reflux disease without esophagitis  -     CBC Auto Differential; Future    Anemia due to vitamin B12 deficiency, unspecified B12 deficiency type  -     Vitamin B12; Future    Vitamin D deficiency  -     Vitamin D 25 Hydroxy; Future    Localized osteoporosis without current pathological fracture  -     Comprehensive Metabolic Panel; Future  -     Vitamin D 25 Hydroxy; Future        Lab on 09/01/2020   Component Date Value Ref Range Status   • WBC 09/01/2020 4.23  3.40 - 10.80 10*3/mm3 Final   • RBC 09/01/2020 4.11  3.77 - 5.28 10*6/mm3 Final   • Hemoglobin 09/01/2020 12.2  12.0 - 15.9 g/dL Final   • Hematocrit 09/01/2020 37.3  34.0 - 46.6 % Final   • MCV 09/01/2020 90.8  79.0 - 97.0 fL Final   • MCH 09/01/2020 29.7  26.6 - 33.0 pg Final   • MCHC 09/01/2020 32.7  31.5 - 35.7 g/dL Final   • RDW 09/01/2020 12.9  12.3 - 15.4 % Final   • RDW-SD 09/01/2020 42.2  37.0 - 54.0 fl Final   • MPV 09/01/2020 9.7  6.0 - 12.0 fL Final   • Platelets 09/01/2020 241  140 - 450 10*3/mm3 Final   • Neutrophil % 09/01/2020 40.9* 42.7 - 76.0 % Final   • Lymphocyte % 09/01/2020 38.3  19.6 - 45.3 % Final   • Monocyte % 09/01/2020 8.3  5.0 - 12.0 % Final   • Eosinophil % 09/01/2020 11.8* 0.3 - 6.2 % Final   • Basophil % 09/01/2020 0.7  0.0 - 1.5 % Final   • Neutrophils, Absolute 09/01/2020 1.73  1.70 - 7.00 10*3/mm3 Final   • Lymphocytes, Absolute 09/01/2020 1.62  0.70 - 3.10 10*3/mm3 Final   • Monocytes, Absolute 09/01/2020 0.35  0.10 - 0.90 10*3/mm3 Final   • Eosinophils, Absolute 09/01/2020 0.50* 0.00 - 0.40 10*3/mm3 Final   • Basophils, Absolute 09/01/2020 0.03  0.00 - 0.20 10*3/mm3 Final   • Glucose 09/01/2020 93  70 - 99  mg/dL Final   • BUN 09/01/2020 16  7 - 23 mg/dL Final   • Creatinine 09/01/2020 0.86  0.52 - 1.04 mg/dL Final   • Sodium 09/01/2020 139  137 - 145 mmol/L Final   • Potassium 09/01/2020 4.8  3.4 - 5.0 mmol/L Final   • Chloride 09/01/2020 104  101 - 112 mmol/L Final   • CO2 09/01/2020 29.0  22.0 - 30.0 mmol/L Final   • Calcium 09/01/2020 9.3  8.4 - 10.2 mg/dL Final   • Total Protein 09/01/2020 7.8  6.3 - 8.6 g/dL Final   • Albumin 09/01/2020 4.30  3.50 - 5.00 g/dL Final   • ALT (SGPT) 09/01/2020 15  <=35 U/L Final   • AST (SGOT) 09/01/2020 27  14 - 36 U/L Final   • Alkaline Phosphatase 09/01/2020 88  38 - 126 U/L Final   • Total Bilirubin 09/01/2020 0.6  0.2 - 1.3 mg/dL Final   • eGFR Non African Amer 09/01/2020 64  39 - 90 mL/min/1.73 Final   • Globulin 09/01/2020 3.5  2.3 - 3.5 gm/dL Final   • A/G Ratio 09/01/2020 1.2  1.1 - 1.8 g/dL Final   • BUN/Creatinine Ratio 09/01/2020 18.6  7.0 - 25.0 Final   • Anion Gap 09/01/2020 6.0  5.0 - 15.0 mmol/L Final   • TSH 09/01/2020 0.810  0.270 - 4.200 uIU/mL Final   • Vitamin B-12 09/01/2020 629  211 - 946 pg/mL Final   • 25 Hydroxy, Vitamin D 09/01/2020 53.6  30.0 - 100.0 ng/ml Final   • Hepatitis C Ab 09/01/2020 Non-Reactive  Non-Reactive Final   ]

## 2020-09-08 NOTE — PATIENT INSTRUCTIONS
Osteoporosis    Osteoporosis is thinning and loss of density in your bones. Osteoporosis makes bones more brittle and fragile and more likely to break (fracture). Over time, osteoporosis can cause your bones to become so weak that they fracture after a minor fall. Bones in the hip, wrist, and spine are most likely to fracture due to osteoporosis.  What are the causes?  The exact cause of this condition is not known.  What increases the risk?  You may be at greater risk for osteoporosis if you:  · Have a family history of the condition.  · Have poor nutrition.  · Use steroid medicines, such as prednisone.  · Are female.  · Are age 50 or older.  · Smoke or have a history of smoking.  · Are not physically active (are sedentary).  · Are white () or of  descent.  · Have a small body frame.  · Take certain medicines, such as antiseizure medicines.  What are the signs or symptoms?  A fracture might be the first sign of osteoporosis, especially if the fracture results from a fall or injury that usually would not cause a bone to break. Other signs and symptoms include:  · Pain in the neck or low back.  · Stooped posture.  · Loss of height.  How is this diagnosed?  This condition may be diagnosed based on:  · Your medical history.  · A physical exam.  · A bone mineral density test, also called a DXA or DEXA test (dual-energy X-ray absorptiometry test). This test uses X-rays to measure the amount of minerals in your bones.  How is this treated?  The goal of treatment is to strengthen your bones and lower your risk for a fracture. Treatment may involve:  · Making lifestyle changes, such as:  ? Including foods with more calcium and vitamin D in your diet.  ? Doing weight-bearing and muscle-strengthening exercises.  ? Stopping tobacco use.  ? Limiting alcohol intake.  · Taking medicine to slow the process of bone loss or to increase bone density.  · Taking daily supplements of calcium and vitamin D.  · Taking  hormone replacement medicines, such as estrogen for women and testosterone for men.  · Monitoring your levels of calcium and vitamin D.  Follow these instructions at home:    Activity  · Exercise as told by your health care provider. Ask your health care provider what exercises and activities are safe for you. You should do:  ? Exercises that make you work against gravity (weight-bearing exercises), such as valentina chi, yoga, or walking.  ? Exercises to strengthen muscles, such as lifting weights.  Lifestyle  · Limit alcohol intake to no more than 1 drink a day for nonpregnant women and 2 drinks a day for men. One drink equals 12 oz of beer, 5 oz of wine, or 1½ oz of hard liquor.  · Do not use any products that contain nicotine or tobacco, such as cigarettes and e-cigarettes. If you need help quitting, ask your health care provider.  Preventing falls  · Use devices to help you move around (mobility aids) as needed, such as canes, walkers, scooters, or crutches.  · Keep rooms well-lit and clutter-free.  · Remove tripping hazards from walkways, including cords and throw rugs.  · Install grab bars in bathrooms and safety rails on stairs.  · Use rubber mats in the bathroom and other areas that are often wet or slippery.  · Wear closed-toe shoes that fit well and support your feet. Wear shoes that have rubber soles or low heels.  · Review your medicines with your health care provider. Some medicines can cause dizziness or changes in blood pressure, which can increase your risk of falling.  General instructions  · Include calcium and vitamin D in your diet. Calcium is important for bone health, and vitamin D helps your body to absorb calcium. Good sources of calcium and vitamin D include:  ? Certain fatty fish, such as salmon and tuna.  ? Products that have calcium and vitamin D added to them (fortified products), such as fortified cereals.  ? Egg yolks.  ? Cheese.  ? Liver.  · Take over-the-counter and prescription medicines  only as told by your health care provider.  · Keep all follow-up visits as told by your health care provider. This is important.  Contact a health care provider if:  · You have never been screened for osteoporosis and you are:  ? A woman who is age 65 or older.  ? A man who is age 70 or older.  Get help right away if:  · You fall or injure yourself.  Summary  · Osteoporosis is thinning and loss of density in your bones. This makes bones more brittle and fragile and more likely to break (fracture),even with minor falls.  · The goal of treatment is to strengthen your bones and reduce your risk for a fracture.  · Include calcium and vitamin D in your diet. Calcium is important for bone health, and vitamin D helps your body to absorb calcium.  · Talk with your health care provider about screening for osteoporosis if you are a woman who is age 65 or older, or a man who is age 70 or older.  This information is not intended to replace advice given to you by your health care provider. Make sure you discuss any questions you have with your health care provider.  Document Released: 09/27/2006 Document Revised: 11/30/2018 Document Reviewed: 10/12/2018  [employer]vier Patient Education © 2020 [employer]

## 2021-02-24 ENCOUNTER — IMMUNIZATION (OUTPATIENT)
Dept: VACCINE CLINIC | Facility: HOSPITAL | Age: 76
End: 2021-02-24

## 2021-02-24 PROCEDURE — 91300 HC SARSCOV02 VAC 30MCG/0.3ML IM: CPT | Performed by: THORACIC SURGERY (CARDIOTHORACIC VASCULAR SURGERY)

## 2021-02-24 PROCEDURE — 0001A: CPT | Performed by: THORACIC SURGERY (CARDIOTHORACIC VASCULAR SURGERY)

## 2021-03-01 RX ORDER — LISINOPRIL 20 MG/1
TABLET ORAL
Qty: 90 TABLET | Refills: 3 | Status: SHIPPED | OUTPATIENT
Start: 2021-03-01 | End: 2021-09-27 | Stop reason: SDUPTHER

## 2021-03-08 ENCOUNTER — LAB (OUTPATIENT)
Dept: LAB | Facility: OTHER | Age: 76
End: 2021-03-08

## 2021-03-08 DIAGNOSIS — I10 ESSENTIAL (PRIMARY) HYPERTENSION: Chronic | ICD-10-CM

## 2021-03-08 DIAGNOSIS — J43.2 CENTRILOBULAR EMPHYSEMA (HCC): Chronic | ICD-10-CM

## 2021-03-08 DIAGNOSIS — K21.9 GASTROESOPHAGEAL REFLUX DISEASE WITHOUT ESOPHAGITIS: Chronic | ICD-10-CM

## 2021-03-08 DIAGNOSIS — M81.6 LOCALIZED OSTEOPOROSIS WITHOUT CURRENT PATHOLOGICAL FRACTURE: Chronic | ICD-10-CM

## 2021-03-08 DIAGNOSIS — E55.9 VITAMIN D DEFICIENCY: Chronic | ICD-10-CM

## 2021-03-08 DIAGNOSIS — D51.9 ANEMIA DUE TO VITAMIN B12 DEFICIENCY, UNSPECIFIED B12 DEFICIENCY TYPE: Chronic | ICD-10-CM

## 2021-03-08 LAB
ALBUMIN SERPL-MCNC: 4.2 G/DL (ref 3.5–5)
ALBUMIN/GLOB SERPL: 1.2 G/DL (ref 1.1–1.8)
ALP SERPL-CCNC: 100 U/L (ref 38–126)
ALT SERPL W P-5'-P-CCNC: 20 U/L
ANION GAP SERPL CALCULATED.3IONS-SCNC: 8 MMOL/L (ref 5–15)
AST SERPL-CCNC: 36 U/L (ref 14–36)
BASOPHILS # BLD AUTO: 0.02 10*3/MM3 (ref 0–0.2)
BASOPHILS NFR BLD AUTO: 0.5 % (ref 0–1.5)
BILIRUB SERPL-MCNC: 0.5 MG/DL (ref 0.2–1.3)
BUN SERPL-MCNC: 13 MG/DL (ref 7–23)
BUN/CREAT SERPL: 15.5 (ref 7–25)
CALCIUM SPEC-SCNC: 9.5 MG/DL (ref 8.4–10.2)
CHLORIDE SERPL-SCNC: 104 MMOL/L (ref 101–112)
CHOLEST SERPL-MCNC: 213 MG/DL (ref 150–200)
CO2 SERPL-SCNC: 28 MMOL/L (ref 22–30)
CREAT SERPL-MCNC: 0.84 MG/DL (ref 0.52–1.04)
DEPRECATED RDW RBC AUTO: 43.1 FL (ref 37–54)
EOSINOPHIL # BLD AUTO: 0.41 10*3/MM3 (ref 0–0.4)
EOSINOPHIL NFR BLD AUTO: 10 % (ref 0.3–6.2)
ERYTHROCYTE [DISTWIDTH] IN BLOOD BY AUTOMATED COUNT: 13.4 % (ref 12.3–15.4)
GFR SERPL CREATININE-BSD FRML MDRD: 66 ML/MIN/1.73 (ref 39–90)
GLOBULIN UR ELPH-MCNC: 3.6 GM/DL (ref 2.3–3.5)
GLUCOSE SERPL-MCNC: 84 MG/DL (ref 70–99)
HCT VFR BLD AUTO: 37.8 % (ref 34–46.6)
HDLC SERPL-MCNC: 72 MG/DL (ref 40–59)
HGB BLD-MCNC: 12.3 G/DL (ref 12–15.9)
LDLC SERPL CALC-MCNC: 124 MG/DL
LDLC/HDLC SERPL: 1.68 {RATIO} (ref 0–3.22)
LYMPHOCYTES # BLD AUTO: 1.6 10*3/MM3 (ref 0.7–3.1)
LYMPHOCYTES NFR BLD AUTO: 38.8 % (ref 19.6–45.3)
MCH RBC QN AUTO: 29.1 PG (ref 26.6–33)
MCHC RBC AUTO-ENTMCNC: 32.5 G/DL (ref 31.5–35.7)
MCV RBC AUTO: 89.4 FL (ref 79–97)
MONOCYTES # BLD AUTO: 0.34 10*3/MM3 (ref 0.1–0.9)
MONOCYTES NFR BLD AUTO: 8.3 % (ref 5–12)
NEUTROPHILS NFR BLD AUTO: 1.75 10*3/MM3 (ref 1.7–7)
NEUTROPHILS NFR BLD AUTO: 42.4 % (ref 42.7–76)
PLATELET # BLD AUTO: 261 10*3/MM3 (ref 140–450)
PMV BLD AUTO: 9.5 FL (ref 6–12)
POTASSIUM SERPL-SCNC: 4.4 MMOL/L (ref 3.4–5)
PROT SERPL-MCNC: 7.8 G/DL (ref 6.3–8.6)
RBC # BLD AUTO: 4.23 10*6/MM3 (ref 3.77–5.28)
SODIUM SERPL-SCNC: 140 MMOL/L (ref 137–145)
TRIGL SERPL-MCNC: 99 MG/DL
VLDLC SERPL-MCNC: 17 MG/DL (ref 5–40)
WBC # BLD AUTO: 4.12 10*3/MM3 (ref 3.4–10.8)

## 2021-03-08 PROCEDURE — 85025 COMPLETE CBC W/AUTO DIFF WBC: CPT | Performed by: INTERNAL MEDICINE

## 2021-03-08 PROCEDURE — 82306 VITAMIN D 25 HYDROXY: CPT | Performed by: INTERNAL MEDICINE

## 2021-03-08 PROCEDURE — 36415 COLL VENOUS BLD VENIPUNCTURE: CPT | Performed by: INTERNAL MEDICINE

## 2021-03-08 PROCEDURE — 80053 COMPREHEN METABOLIC PANEL: CPT | Performed by: INTERNAL MEDICINE

## 2021-03-08 PROCEDURE — 80061 LIPID PANEL: CPT | Performed by: INTERNAL MEDICINE

## 2021-03-08 PROCEDURE — 82607 VITAMIN B-12: CPT | Performed by: INTERNAL MEDICINE

## 2021-03-09 LAB
25(OH)D3 SERPL-MCNC: 52.3 NG/ML
VIT B12 BLD-MCNC: 581 PG/ML (ref 211–946)

## 2021-03-16 ENCOUNTER — OFFICE VISIT (OUTPATIENT)
Dept: FAMILY MEDICINE CLINIC | Facility: CLINIC | Age: 76
End: 2021-03-16

## 2021-03-16 VITALS
WEIGHT: 107 LBS | HEART RATE: 96 BPM | DIASTOLIC BLOOD PRESSURE: 82 MMHG | BODY MASS INDEX: 19.69 KG/M2 | SYSTOLIC BLOOD PRESSURE: 145 MMHG | HEIGHT: 62 IN

## 2021-03-16 DIAGNOSIS — D51.9 ANEMIA DUE TO VITAMIN B12 DEFICIENCY, UNSPECIFIED B12 DEFICIENCY TYPE: Chronic | ICD-10-CM

## 2021-03-16 DIAGNOSIS — J30.1 SEASONAL ALLERGIC RHINITIS DUE TO POLLEN: Chronic | ICD-10-CM

## 2021-03-16 DIAGNOSIS — I10 ESSENTIAL (PRIMARY) HYPERTENSION: Primary | Chronic | ICD-10-CM

## 2021-03-16 DIAGNOSIS — J43.2 CENTRILOBULAR EMPHYSEMA (HCC): Chronic | ICD-10-CM

## 2021-03-16 DIAGNOSIS — M81.6 LOCALIZED OSTEOPOROSIS WITHOUT CURRENT PATHOLOGICAL FRACTURE: Chronic | ICD-10-CM

## 2021-03-16 DIAGNOSIS — E55.9 VITAMIN D DEFICIENCY: Chronic | ICD-10-CM

## 2021-03-16 DIAGNOSIS — K21.9 GASTROESOPHAGEAL REFLUX DISEASE WITHOUT ESOPHAGITIS: Chronic | ICD-10-CM

## 2021-03-16 PROCEDURE — G2025 DIS SITE TELE SVCS RHC/FQHC: HCPCS | Performed by: INTERNAL MEDICINE

## 2021-03-16 NOTE — PROGRESS NOTES
"You have chosen to receive care through a telephone visit. Do you consent to use a telephone visit for your medical care today? Yes    Total visit time: 33 minutes.     Chief Complaint  Follow-up (6 month)    Subjective     {Problem List  Visit Diagnosis   Encounters  Notes  Medications  Labs  Result Review Imaging  Media :23}     History of Present Illness     Court Johnson is our 75-year-old patient who receives care via telephone through [facility] for 6-month follow-up of hypertension, vitamin D deficiency, osteoporosis, anemia due to vitamin B12 deficiency, seasonal allergic rhinitis and other issues.  Denies GERD symptoms.  Patient reports mild intermittent asthma symptoms are stable.     DEXA 09/2020 revealed 13% improvement in density of lumbar spine and persistent, although, stable osteoporosis of the left femoral neck.  She continues IV Reclast with her most recent infusion 07/06/2020 as well as calcium and vitamin D supplements.  She is scheduled for her next Reclast 07/2021.  She could not tolerate oral bisphosphonates.    She reports stable body weight at 107 pounds with BMI 19.6.  Blood pressure slightly above goal at 145/82.  She states BP normally varies between 130s and 140s.  We will have her continue to monitor BP, although, may need to make a dose increase with her next visit if BP remains above goal.        [facility] and is scheduled for her second dose tomorrow.      The patient's relevant past medical, surgical, and social history was reviewed in Epic.   Lab results are reviewed with the patient today.  CBC and CMP unremarkable.  Normal renal and liver function.  Total cholesterol 213.  Her excellent HDL cholesterol of 72 gives her excellent LDL/HDL ratio at 1.68.          Objective   Vital Signs:   /82   Pulse 96   Ht 157.5 cm (62\")   Wt 48.5 kg (107 lb)   BMI 19.57 kg/m²   "   Physical Exam     Result Review :     CMP    CMP 9/1/20 3/8/21   Glucose 93 84   BUN 16 13   Creatinine 0.86 0.84   eGFR Non African Am 64 66   Sodium 139 140   Potassium 4.8 4.4   Chloride 104 104   Calcium 9.3 9.5   Albumin 4.30 4.20   Total Bilirubin 0.6 0.5   Alkaline Phosphatase 88 100   AST (SGOT) 27 36   ALT (SGPT) 15 20           CBC w/diff    CBC w/Diff 9/1/20 3/8/21   WBC 4.23 4.12   RBC 4.11 4.23   Hemoglobin 12.2 12.3   Hematocrit 37.3 37.8   MCV 90.8 89.4   MCH 29.7 29.1   MCHC 32.7 32.5   RDW 12.9 13.4   Platelets 241 261   Neutrophil Rel % 40.9 (A) 42.4 (A)   Lymphocyte Rel % 38.3 38.8   Monocyte Rel % 8.3 8.3   Eosinophil Rel % 11.8 (A) 10.0 (A)   Basophil Rel % 0.7 0.5   (A) Abnormal value            Lipid Panel    Lipid Panel 3/8/21   Total Cholesterol 213 (A)   Triglycerides 99   HDL Cholesterol 72 (A)   VLDL Cholesterol 17   LDL Cholesterol  124 (A)   LDL/HDL Ratio 1.68   (A) Abnormal value            TSH    TSH 9/1/20   TSH 0.810               Data reviewed: Radiologic studies DEXA 09/2020       Future Appointments   Date Time Provider Department Center   3/17/2021 11:05 AM C19 VACCINE CLIN MAD 2ND DOSE  MAD C19VC MAD   7/7/2021 10:30 AM [facility] OP INFU CHAIR 12 [facility] OPI MAD        Assessment and Plan    Diagnoses and all orders for this visit:    1. Essential (primary) hypertension (Primary)  -     CBC Auto Differential; Future  -     Comprehensive Metabolic Panel; Future  -     TSH; Future    2. Centrilobular emphysema (CMS/HCC)  -     CBC Auto Differential; Future    3. Vitamin D deficiency  -     Vitamin D 25 Hydroxy; Future  -     TSH; Future    4. Gastroesophageal reflux disease without esophagitis  -     CBC Auto Differential; Future    5. Anemia due to vitamin B12 deficiency, unspecified B12 deficiency type  -     CBC Auto Differential; Future  -     Vitamin B12; Future    6. Localized osteoporosis without current pathological fracture  -     Vitamin D 25 Hydroxy; Future    7.  Seasonal allergic rhinitis due to pollen             Continue the vitamin B12 supplement.     Continue diet control of her hyperlipidemia.   Her excellent HDL gives her favorable LDL/HDL ratio      Continue the IV Reclast, with next infusion due July 2021.  Continue the calcium/vitamin D supplements.  Fall precautions.  Pursue daily weightbearing exercises.  We will repeat DEXA 09/2022.  She will be due repeat mammogram 09/2021.      COPD/mild intermittent asthma and seasonal allergy symptoms stable.  She has albuterol inhaler to use as needed for mild intermittent asthma as well as Singulair and Flonase nasal spray to use for flares.      Continue lisinopril 10 mg daily.  Recommended sodium restriction.  Monitor BP episodically at rest and notify me if systolic is consistently above 140.  If BP remains above goal with her next visit, we may need to increase the dose.     GERD symptoms are stable.  She reports not needing any H2 blockers as long as she avoids NSAIDs and avoids late night eating.    Continue other medications and vitamin and mineral supplements to treat additional medical problems which we addressed today.        Return in six months for routine follow up with fasting labs one week prior or sooner if needed.              Scribed for Dr. Heart by Olesya Bruno [facility].     Follow Up   Return in about 6 months (around 9/16/2021) for Follow up in six months with labs one week prior..  Patient was given instructions and counseling regarding her condition or for health maintenance advice. Please see specific information pulled into the AVS if appropriate.

## 2021-03-17 ENCOUNTER — IMMUNIZATION (OUTPATIENT)
Dept: VACCINE CLINIC | Facility: HOSPITAL | Age: 76
End: 2021-03-17

## 2021-03-17 PROCEDURE — 0002A: CPT | Performed by: NURSE PRACTITIONER

## 2021-03-17 PROCEDURE — 91300 HC SARSCOV02 VAC 30MCG/0.3ML IM: CPT | Performed by: NURSE PRACTITIONER

## 2021-07-06 RX ORDER — ZOLEDRONIC ACID 5 MG/100ML
5 INJECTION, SOLUTION INTRAVENOUS ONCE
Status: CANCELLED
Start: 2021-07-06 | End: 2021-07-06

## 2021-07-06 RX ORDER — SODIUM CHLORIDE 9 MG/ML
250 INJECTION, SOLUTION INTRAVENOUS ONCE
Status: CANCELLED | OUTPATIENT
Start: 2021-07-06

## 2021-07-07 ENCOUNTER — INFUSION (OUTPATIENT)
Dept: ONCOLOGY | Facility: HOSPITAL | Age: 76
End: 2021-07-07

## 2021-07-07 VITALS
HEART RATE: 95 BPM | DIASTOLIC BLOOD PRESSURE: 79 MMHG | RESPIRATION RATE: 20 BRPM | SYSTOLIC BLOOD PRESSURE: 159 MMHG | TEMPERATURE: 97 F | OXYGEN SATURATION: 100 %

## 2021-07-07 DIAGNOSIS — M81.6 LOCALIZED OSTEOPOROSIS WITHOUT CURRENT PATHOLOGICAL FRACTURE: Primary | ICD-10-CM

## 2021-07-07 PROCEDURE — 25010000002 ZOLEDRONIC ACID 5 MG/100ML SOLUTION: Performed by: INTERNAL MEDICINE

## 2021-07-07 PROCEDURE — 96365 THER/PROPH/DIAG IV INF INIT: CPT | Performed by: INTERNAL MEDICINE

## 2021-07-07 RX ORDER — SODIUM CHLORIDE 9 MG/ML
250 INJECTION, SOLUTION INTRAVENOUS ONCE
Status: COMPLETED | OUTPATIENT
Start: 2021-07-07 | End: 2021-07-07

## 2021-07-07 RX ORDER — ZOLEDRONIC ACID 5 MG/100ML
5 INJECTION, SOLUTION INTRAVENOUS ONCE
Status: CANCELLED
Start: 2022-07-06 | End: 2022-07-06

## 2021-07-07 RX ORDER — SODIUM CHLORIDE 9 MG/ML
250 INJECTION, SOLUTION INTRAVENOUS ONCE
Status: CANCELLED | OUTPATIENT
Start: 2022-07-06

## 2021-07-07 RX ORDER — ZOLEDRONIC ACID 5 MG/100ML
5 INJECTION, SOLUTION INTRAVENOUS ONCE
Status: COMPLETED | OUTPATIENT
Start: 2021-07-07 | End: 2021-07-07

## 2021-07-07 RX ORDER — ZOLEDRONIC ACID 5 MG/100ML
5 INJECTION, SOLUTION INTRAVENOUS ONCE
Status: CANCELLED | OUTPATIENT
Start: 2022-07-06

## 2021-07-07 RX ADMIN — ZOLEDRONIC ACID 5 MG: 5 INJECTION, SOLUTION INTRAVENOUS at 11:01

## 2021-07-07 RX ADMIN — SODIUM CHLORIDE 250 ML: 9 INJECTION, SOLUTION INTRAVENOUS at 11:01

## 2021-09-21 ENCOUNTER — LAB (OUTPATIENT)
Dept: LAB | Facility: OTHER | Age: 76
End: 2021-09-21

## 2021-09-21 DIAGNOSIS — I10 ESSENTIAL (PRIMARY) HYPERTENSION: Chronic | ICD-10-CM

## 2021-09-21 DIAGNOSIS — M81.6 LOCALIZED OSTEOPOROSIS WITHOUT CURRENT PATHOLOGICAL FRACTURE: Chronic | ICD-10-CM

## 2021-09-21 DIAGNOSIS — J43.2 CENTRILOBULAR EMPHYSEMA (HCC): Chronic | ICD-10-CM

## 2021-09-21 DIAGNOSIS — K21.9 GASTROESOPHAGEAL REFLUX DISEASE WITHOUT ESOPHAGITIS: Chronic | ICD-10-CM

## 2021-09-21 DIAGNOSIS — E55.9 VITAMIN D DEFICIENCY: Chronic | ICD-10-CM

## 2021-09-21 DIAGNOSIS — D51.9 ANEMIA DUE TO VITAMIN B12 DEFICIENCY, UNSPECIFIED B12 DEFICIENCY TYPE: Chronic | ICD-10-CM

## 2021-09-21 LAB
25(OH)D3 SERPL-MCNC: 43.2 NG/ML
ALBUMIN SERPL-MCNC: 4.1 G/DL (ref 3.5–5)
ALBUMIN/GLOB SERPL: 1.2 G/DL (ref 1.1–1.8)
ALP SERPL-CCNC: 82 U/L (ref 38–126)
ALT SERPL W P-5'-P-CCNC: 14 U/L
ANION GAP SERPL CALCULATED.3IONS-SCNC: 7 MMOL/L (ref 5–15)
AST SERPL-CCNC: 22 U/L (ref 14–36)
BASOPHILS # BLD AUTO: 0.03 10*3/MM3 (ref 0–0.2)
BASOPHILS NFR BLD AUTO: 0.6 % (ref 0–1.5)
BILIRUB SERPL-MCNC: 0.4 MG/DL (ref 0.2–1.3)
BUN SERPL-MCNC: 13 MG/DL (ref 7–23)
BUN/CREAT SERPL: 14.4 (ref 7–25)
CALCIUM SPEC-SCNC: 9.4 MG/DL (ref 8.4–10.2)
CHLORIDE SERPL-SCNC: 105 MMOL/L (ref 101–112)
CO2 SERPL-SCNC: 28 MMOL/L (ref 22–30)
CREAT SERPL-MCNC: 0.9 MG/DL (ref 0.52–1.04)
DEPRECATED RDW RBC AUTO: 41.9 FL (ref 37–54)
EOSINOPHIL # BLD AUTO: 0.31 10*3/MM3 (ref 0–0.4)
EOSINOPHIL NFR BLD AUTO: 5.8 % (ref 0.3–6.2)
ERYTHROCYTE [DISTWIDTH] IN BLOOD BY AUTOMATED COUNT: 12.9 % (ref 12.3–15.4)
GFR SERPL CREATININE-BSD FRML MDRD: 61 ML/MIN/1.73 (ref 39–90)
GLOBULIN UR ELPH-MCNC: 3.4 GM/DL (ref 2.3–3.5)
GLUCOSE SERPL-MCNC: 93 MG/DL (ref 70–99)
HCT VFR BLD AUTO: 37.2 % (ref 34–46.6)
HGB BLD-MCNC: 12.2 G/DL (ref 12–15.9)
LYMPHOCYTES # BLD AUTO: 1.99 10*3/MM3 (ref 0.7–3.1)
LYMPHOCYTES NFR BLD AUTO: 37.5 % (ref 19.6–45.3)
MCH RBC QN AUTO: 29.5 PG (ref 26.6–33)
MCHC RBC AUTO-ENTMCNC: 32.8 G/DL (ref 31.5–35.7)
MCV RBC AUTO: 89.9 FL (ref 79–97)
MONOCYTES # BLD AUTO: 0.55 10*3/MM3 (ref 0.1–0.9)
MONOCYTES NFR BLD AUTO: 10.4 % (ref 5–12)
NEUTROPHILS NFR BLD AUTO: 2.43 10*3/MM3 (ref 1.7–7)
NEUTROPHILS NFR BLD AUTO: 45.7 % (ref 42.7–76)
PLATELET # BLD AUTO: 253 10*3/MM3 (ref 140–450)
PMV BLD AUTO: 9.5 FL (ref 6–12)
POTASSIUM SERPL-SCNC: 4.2 MMOL/L (ref 3.4–5)
PROT SERPL-MCNC: 7.5 G/DL (ref 6.3–8.6)
RBC # BLD AUTO: 4.14 10*6/MM3 (ref 3.77–5.28)
SODIUM SERPL-SCNC: 140 MMOL/L (ref 137–145)
TSH SERPL DL<=0.05 MIU/L-ACNC: 0.75 UIU/ML (ref 0.27–4.2)
VIT B12 BLD-MCNC: 764 PG/ML (ref 211–946)
WBC # BLD AUTO: 5.31 10*3/MM3 (ref 3.4–10.8)

## 2021-09-21 PROCEDURE — 82607 VITAMIN B-12: CPT | Performed by: INTERNAL MEDICINE

## 2021-09-21 PROCEDURE — 82306 VITAMIN D 25 HYDROXY: CPT | Performed by: INTERNAL MEDICINE

## 2021-09-21 PROCEDURE — 84443 ASSAY THYROID STIM HORMONE: CPT | Performed by: INTERNAL MEDICINE

## 2021-09-21 PROCEDURE — 80053 COMPREHEN METABOLIC PANEL: CPT | Performed by: INTERNAL MEDICINE

## 2021-09-21 PROCEDURE — 85025 COMPLETE CBC W/AUTO DIFF WBC: CPT | Performed by: INTERNAL MEDICINE

## 2021-09-21 PROCEDURE — 36415 COLL VENOUS BLD VENIPUNCTURE: CPT | Performed by: INTERNAL MEDICINE

## 2021-09-27 ENCOUNTER — OFFICE VISIT (OUTPATIENT)
Dept: FAMILY MEDICINE CLINIC | Facility: CLINIC | Age: 76
End: 2021-09-27

## 2021-09-27 VITALS — WEIGHT: 103 LBS | HEIGHT: 62 IN | BODY MASS INDEX: 18.95 KG/M2

## 2021-09-27 DIAGNOSIS — E55.9 VITAMIN D DEFICIENCY: Chronic | ICD-10-CM

## 2021-09-27 DIAGNOSIS — J43.2 CENTRILOBULAR EMPHYSEMA (HCC): Chronic | ICD-10-CM

## 2021-09-27 DIAGNOSIS — I10 ESSENTIAL (PRIMARY) HYPERTENSION: Primary | Chronic | ICD-10-CM

## 2021-09-27 DIAGNOSIS — M81.6 LOCALIZED OSTEOPOROSIS WITHOUT CURRENT PATHOLOGICAL FRACTURE: Chronic | ICD-10-CM

## 2021-09-27 DIAGNOSIS — D51.9 ANEMIA DUE TO VITAMIN B12 DEFICIENCY, UNSPECIFIED B12 DEFICIENCY TYPE: Chronic | ICD-10-CM

## 2021-09-27 DIAGNOSIS — J30.1 SEASONAL ALLERGIC RHINITIS DUE TO POLLEN: Chronic | ICD-10-CM

## 2021-09-27 PROCEDURE — G2025 DIS SITE TELE SVCS RHC/FQHC: HCPCS | Performed by: INTERNAL MEDICINE

## 2021-09-27 RX ORDER — LORATADINE 10 MG/1
10 TABLET ORAL DAILY
Qty: 30 TABLET | Refills: 11 | Status: SHIPPED | OUTPATIENT
Start: 2021-09-27

## 2021-09-27 RX ORDER — LISINOPRIL 20 MG/1
30 TABLET ORAL DAILY
Qty: 135 TABLET | Refills: 3 | Status: SHIPPED | OUTPATIENT
Start: 2021-09-27 | End: 2022-02-14

## 2021-09-27 RX ORDER — FLUTICASONE PROPIONATE 50 MCG
1 SPRAY, SUSPENSION (ML) NASAL 2 TIMES DAILY
Qty: 15.8 ML | Refills: 11 | Status: SHIPPED | OUTPATIENT
Start: 2021-09-27

## 2021-09-27 NOTE — PROGRESS NOTES
"Chief Complaint  Follow-up (6  month)  You have chosen to receive care through a telephone visit. Do you consent to use a telephone visit for your medical care today? Yes    Subjective          Court Johnson presents to [facility] [place]  History of Present Illness    Pat is our 75-year-old patient who receives care via telephone through [facility] for 6-month follow-up of hypertension, vitamin D deficiency, osteoporosis, anemia due to vitamin B12 deficiency, seasonal allergic rhinitis and other issues.      We have been treating her osteoporosis with IV Reclast.  She received her last IV Reclast 7/2021.  She continues on calcium/vitamin D supplements.    She checks blood pressure occasionally at home, and systolic is usually in the 140s.  Her weight is down 4 pounds and BMI is now only 19.  She reports that she has been active this summer.  She reports her appetite is still good.  No change in bowel movements.  No blood in urine or stool.    She is due for repeat colonoscopy, but wants to wait until spring due to the Covid pandemic.    She is describing symptoms consistent with fall allergic rhinitis.  She has been out of her Flonase nasal spray.  She is not taking any antihistamine.  She takes Singulair.  She denies any COPD flares.  She has an albuterol inhaler to use as needed.    Objective   Vital Signs:   Ht 157.5 cm (62\")   Wt 46.7 kg (103 lb)   BMI 18.84 kg/m²     Physical Exam   Result Review :     Common labs    Common Labsle 3/8/21 3/8/21 3/8/21 9/21/21 9/21/21    0934 0934 0934 0915 0915   Glucose  84   93   BUN  13   13   Creatinine  0.84   0.90   eGFR Non African Am  66   61   Sodium  140   140   Potassium  4.4   4.2   Chloride  104   105   Calcium  9.5   9.4   Albumin  4.20   4.10   Total Bilirubin  0.5   0.4   Alkaline Phosphatase  100   82   AST (SGOT)  36   22   ALT (SGPT)  20   14   WBC 4.12   5.31    Hemoglobin " 12.3   12.2    Hematocrit 37.8   37.2    Platelets 261   253    Total Cholesterol   213 (A)     Triglycerides   99     HDL Cholesterol   72 (A)     LDL Cholesterol    124 (A)     (A) Abnormal value            Data reviewed: Radiologic studies Last DEXA          Assessment and Plan    Diagnoses and all orders for this visit:    1. Essential (primary) hypertension (Primary)  -     CBC Auto Differential; Future  -     Comprehensive Metabolic Panel; Future  -     Lipid Panel; Future    2. Seasonal allergic rhinitis due to pollen    3. Vitamin D deficiency  -     Vitamin D 25 Hydroxy; Future    4. Anemia due to vitamin B12 deficiency, unspecified B12 deficiency type  -     CBC Auto Differential; Future  -     Vitamin B12; Future    5. Localized osteoporosis without current pathological fracture  -     Vitamin D 25 Hydroxy; Future    6. Centrilobular emphysema (HCC)  -     CBC Auto Differential; Future  -     Comprehensive Metabolic Panel; Future    Other orders  -     lisinopril (PRINIVIL,ZESTRIL) 20 MG tablet; Take 1.5 tablets by mouth Daily.  Dispense: 135 tablet; Refill: 3  -     fluticasone (FLONASE) 50 MCG/ACT nasal spray; 1 spray into the nostril(s) as directed by provider 2 (Two) Times a Day.  Dispense: 15.8 mL; Refill: 11  -     loratadine (Claritin) 10 MG tablet; Take 1 tablet by mouth Daily.  Dispense: 30 tablet; Refill: 11      Increase lisinopril to 30 mg daily.  Continue to monitor blood pressure at home and notify us if systolic is not consistently less than 140.  Sodium restriction.    Continue the vitamin B12 and the calcium/vitamin D supplements.    Continue the annual IV Reclast and we will repeat DEXA every 2 years.  Fall precautions.  Weightbearing exercises.    Resume Flonase nasal spray twice daily through fall allergy season.  Resume a nondrowsy antihistamine daily, such as Claritin, which I sent to her pharmacy.    Continue to use the albuterol inhaler as needed.  Continue the Singulair at night.   Respiratory status is stable.    Return to clinic in 6 months with fasting labs prior.    I spent 30 minutes caring for Court on this date of service. This time includes time spent by me in the following activities:preparing for the visit, reviewing tests, obtaining and/or reviewing a separately obtained history, performing a medically appropriate examination and/or evaluation , counseling and educating the patient/family/caregiver, ordering medications, tests, or procedures and documenting information in the medical record  Follow Up   Return in about 6 months (around 3/27/2022) for Next scheduled follow up - labs 1 week prior.  Patient was given instructions and counseling regarding her condition or for health maintenance advice. Please see specific information pulled into the AVS if appropriate.

## 2021-11-02 ENCOUNTER — OFFICE VISIT (OUTPATIENT)
Dept: FAMILY MEDICINE CLINIC | Facility: CLINIC | Age: 76
End: 2021-11-02

## 2021-11-02 VITALS — WEIGHT: 103 LBS | BODY MASS INDEX: 18.95 KG/M2 | HEIGHT: 62 IN

## 2021-11-02 DIAGNOSIS — Z00.00 MEDICARE ANNUAL WELLNESS VISIT, SUBSEQUENT: Primary | ICD-10-CM

## 2021-11-02 DIAGNOSIS — Z23 NEED FOR IMMUNIZATION AGAINST INFLUENZA: ICD-10-CM

## 2021-11-02 DIAGNOSIS — Z23 NEED FOR COVID-19 VACCINE: ICD-10-CM

## 2021-11-02 PROCEDURE — 1159F MED LIST DOCD IN RCRD: CPT | Performed by: INTERNAL MEDICINE

## 2021-11-02 PROCEDURE — 1126F AMNT PAIN NOTED NONE PRSNT: CPT | Performed by: INTERNAL MEDICINE

## 2021-11-02 PROCEDURE — G0439 PPPS, SUBSEQ VISIT: HCPCS | Performed by: INTERNAL MEDICINE

## 2021-11-02 NOTE — PATIENT INSTRUCTIONS
Medicare Wellness  Personal Prevention Plan of Service     Date of Office Visit:  2021  Encounter Provider:  Andres Heart MD  Place of Service:  [facility] [place]  Patient Name: Court Johnson  :  1945    As part of the Medicare Wellness portion of your visit today, we are providing you with this personalized preventive plan of services (PPPS). This plan is based upon recommendations of the [place] Preventive Services Task Force (USPSTF) and the Advisory Committee on Immunization Practices (ACIP).    This lists the preventive care services that should be considered, and provides dates of when you are due. Items listed as completed are up-to-date and do not require any further intervention.    Health Maintenance   Topic Date Due   • ZOSTER VACCINE (2 of 3) 10/23/2007   • COLORECTAL CANCER SCREENING  2021   • INFLUENZA VACCINE  2021   • ANNUAL WELLNESS VISIT  2021   • COVID-19 Vaccine (3 - Pfizer booster) 2021   • LIPID PANEL  2022   • DXA SCAN  2022   • MAMMOGRAM  2023   • TDAP/TD VACCINES (3 - Td or Tdap) 2030   • HEPATITIS C SCREENING  Completed   • Pneumococcal Vaccine 65+  Completed       No orders of the defined types were placed in this encounter.      Return in about 1 year (around 2022) for Medicare Wellness.

## 2021-11-02 NOTE — PROGRESS NOTES
You have chosen to receive care through a telephone visit. Do you consent to use a telephone visit for your medical care today? Yes    The ABCs of the Annual Wellness Visit  Subsequent Medicare Wellness Visit    Chief Complaint   Patient presents with   • Medicare Wellness-subsequent      Subjective    History of Present Illness:  Court Johnson is a 76 y.o. female who presents for a Subsequent Medicare Wellness Visit.    The following portions of the patient's history were reviewed and   updated as appropriate:   She  has a past medical history of Allergic rhinitis, seasonal, Aptyalism, Cervical disc disorder, Chronic obstructive lung disease (HCC), Closed fracture of ankle (2015), Degenerative joint disease involving multiple joints, Dupuytren's disease, Essential (primary) hypertension, GERD (gastroesophageal reflux disease), Glossitis, Hypercholesterolemia, Hyperthyroidism, Impaired fasting glucose, Mild intermittent asthma, Mitral valve regurgitation, Osteoarthritis of multiple joints, Osteoporosis, Pneumonia, Tricuspid valve incompetence, non-rheumatic, Venous varices, Vitamin B12 deficiency anemia, and Vitamin D deficiency.  She does not have any pertinent problems on file.  She  has a past surgical history that includes Pap Smear (2012);  section; and Ankle surgery.  Her family history includes Breast cancer in her paternal aunt; Cancer in an other family member; Diabetes in her brother.  She  reports that she has never smoked. She has never used smokeless tobacco. She reports that she does not drink alcohol and does not use drugs.  Current Outpatient Medications   Medication Sig Dispense Refill   • albuterol sulfate HFA (PROAIR HFA) 108 (90 Base) MCG/ACT inhaler Inhale 2 puffs 4 (Four) Times a Day As Needed for Shortness of Air (cough). 90 mcg/actuation 1 inhaler 2   • Calcium Carb-Cholecalciferol (CALCIUM CARBONATE-VITAMIN D3) 600-400 MG-UNIT tablet Take 2 tablets by mouth daily.     •  fluticasone (FLONASE) 50 MCG/ACT nasal spray 1 spray into the nostril(s) as directed by provider 2 (Two) Times a Day. 15.8 mL 11   • lisinopril (PRINIVIL,ZESTRIL) 20 MG tablet Take 1.5 tablets by mouth Daily. 135 tablet 3   • loratadine (Claritin) 10 MG tablet Take 1 tablet by mouth Daily. 30 tablet 11   • montelukast (SINGULAIR) 10 MG tablet Take 1 tablet by mouth Every Night. 30 tablet 0   • vitamin B-12 (CYANOCOBALAMIN) 500 MCG tablet Take 500 mcg by mouth Daily.     • Zoledronic Acid (RECLAST IV) Infuse  into a venous catheter. annually       No current facility-administered medications for this visit.     Current Outpatient Medications on File Prior to Visit   Medication Sig   • albuterol sulfate HFA (PROAIR HFA) 108 (90 Base) MCG/ACT inhaler Inhale 2 puffs 4 (Four) Times a Day As Needed for Shortness of Air (cough). 90 mcg/actuation   • Calcium Carb-Cholecalciferol (CALCIUM CARBONATE-VITAMIN D3) 600-400 MG-UNIT tablet Take 2 tablets by mouth daily.   • fluticasone (FLONASE) 50 MCG/ACT nasal spray 1 spray into the nostril(s) as directed by provider 2 (Two) Times a Day.   • lisinopril (PRINIVIL,ZESTRIL) 20 MG tablet Take 1.5 tablets by mouth Daily.   • loratadine (Claritin) 10 MG tablet Take 1 tablet by mouth Daily.   • montelukast (SINGULAIR) 10 MG tablet Take 1 tablet by mouth Every Night.   • vitamin B-12 (CYANOCOBALAMIN) 500 MCG tablet Take 500 mcg by mouth Daily.   • Zoledronic Acid (RECLAST IV) Infuse  into a venous catheter. annually     No current facility-administered medications on file prior to visit.     She is allergic to boniva [ibandronic acid] and fosamax [alendronate]..    Compared to one year ago, the patient feels her physical   health is the same.    Compared to one year ago, the patient feels her mental   health is the same.    Recent Hospitalizations:  She was not admitted to the hospital during the last year.       Current Medical Providers:  Patient Care Team:  Andres Heart MD as PCP -  General (Internal Medicine)    Outpatient Medications Prior to Visit   Medication Sig Dispense Refill   • albuterol sulfate HFA (PROAIR HFA) 108 (90 Base) MCG/ACT inhaler Inhale 2 puffs 4 (Four) Times a Day As Needed for Shortness of Air (cough). 90 mcg/actuation 1 inhaler 2   • Calcium Carb-Cholecalciferol (CALCIUM CARBONATE-VITAMIN D3) 600-400 MG-UNIT tablet Take 2 tablets by mouth daily.     • fluticasone (FLONASE) 50 MCG/ACT nasal spray 1 spray into the nostril(s) as directed by provider 2 (Two) Times a Day. 15.8 mL 11   • lisinopril (PRINIVIL,ZESTRIL) 20 MG tablet Take 1.5 tablets by mouth Daily. 135 tablet 3   • loratadine (Claritin) 10 MG tablet Take 1 tablet by mouth Daily. 30 tablet 11   • montelukast (SINGULAIR) 10 MG tablet Take 1 tablet by mouth Every Night. 30 tablet 0   • vitamin B-12 (CYANOCOBALAMIN) 500 MCG tablet Take 500 mcg by mouth Daily.     • Zoledronic Acid (RECLAST IV) Infuse  into a venous catheter. annually       No facility-administered medications prior to visit.       No opioid medication identified on active medication list. I have reviewed chart for other potential  high risk medication/s and harmful drug interactions in the elderly.          Aspirin is not on active medication list.  Aspirin use is not indicated based on review of current medical condition/s. Risk of harm outweighs potential benefits.  .    Patient Active Problem List   Diagnosis   • Vitamin D deficiency   • Venous varices   • Tricuspid valve incompetence, non-rheumatic   • Seborrheic keratosis   • Pneumonia   • Osteoporosis - next IV Reclast due 7/2021.   • Osteoarthritis of multiple joints   • Mitral valve regurgitation   • Mild intermittent asthma   • GERD (gastroesophageal reflux disease)   • Essential (primary) hypertension   • Dupuytren's disease   • Degenerative joint disease involving multiple joints   • Vitamin B12 deficiency anemia   • Closed fracture of ankle   • Chronic obstructive lung disease (HCC)   •  "Cervical disc disorder   • Aptyalism   • Allergic rhinitis, seasonal     Advance Care Planning  Advance Directive is not on file.  ACP discussion was held with the patient during this visit. Patient does not have an advance directive, information provided.          Objective    Vitals:    11/02/21 0810   Weight: 46.7 kg (103 lb)   Height: 157.5 cm (62\")   PainSc: 0-No pain     BMI Readings from Last 1 Encounters:   11/02/21 18.84 kg/m²   BMI is within normal parameters. No follow-up required.    No blood pressure obtained today with this telephone visit due to coronavirus pandemic.    Does the patient have evidence of cognitive impairment? No    Physical Exam            HEALTH RISK ASSESSMENT    Smoking Status:  Social History     Tobacco Use   Smoking Status Never Smoker   Smokeless Tobacco Never Used     Alcohol Consumption:  Social History     Substance and Sexual Activity   Alcohol Use No     Fall Risk Screen:    JANELLEADI Fall Risk Assessment was completed, and patient is at LOW risk for falls.Assessment completed on:11/2/2021    Depression Screening:  PHQ-2/PHQ-9 Depression Screening 11/2/2021   Little interest or pleasure in doing things 0   Feeling down, depressed, or hopeless 0   Total Score 0       Health Habits and Functional and Cognitive Screening:  Functional & Cognitive Status 11/2/2021   Do you have difficulty preparing food and eating? No   Do you have difficulty bathing yourself, getting dressed or grooming yourself? No   Do you have difficulty using the toilet? No   Do you have difficulty moving around from place to place? No   Do you have trouble with steps or getting out of a bed or a chair? No   Current Diet Well Balanced Diet   Dental Exam Up to date   Eye Exam Up to date   Exercise (times per week) 4 times per week   Current Exercises Include House Cleaning   Current Exercise Activities Include -   Do you need help using the phone?  No   Are you deaf or do you have serious difficulty hearing?  " No   Do you need help with transportation? Yes   Do you need help shopping? Yes   Do you need help preparing meals?  No   Do you need help with housework?  No   Do you need help with laundry? No   Do you need help taking your medications? No   Do you need help managing money? No   Do you ever drive or ride in a car without wearing a seat belt? No   Have you felt unusual stress, anger or loneliness in the last month? No   Who do you live with? Spouse   If you need help, do you have trouble finding someone available to you? No   Have you been bothered in the last four weeks by sexual problems? No   Do you have difficulty concentrating, remembering or making decisions? No       Age-appropriate Screening Schedule:  Refer to the list below for future screening recommendations based on patient's age, sex and/or medical conditions. Orders for these recommended tests are listed in the plan section. The patient has been provided with a written plan.    Health Maintenance   Topic Date Due   • ZOSTER VACCINE (2 of 3) 10/23/2007   • INFLUENZA VACCINE  08/01/2021   • LIPID PANEL  03/08/2022   • DXA SCAN  09/04/2022   • MAMMOGRAM  09/24/2023   • TDAP/TD VACCINES (3 - Td or Tdap) 09/01/2030              Assessment/Plan   CMS Preventative Services Quick Reference  Risk Factors Identified During Encounter  Immunizations Discussed/Encouraged (specific Immunizations; Influenza, Shingrix and COVID19  The above risks/problems have been discussed with the patient.  She agrees to get COVID booster and influenza vaccination ASAP.  We will address Shingrix vaccination afterward.  I offered to refer for repeat colonoscopy, but she wants to wait until spring 2022 due to COVID-19 pandemic  Follow up actions/plans if indicated are seen below in the Assessment/Plan Section.  Pertinent information has been shared with the patient in the After Visit Summary.    Diagnoses and all orders for this visit:    1. Medicare annual wellness visit,  subsequent (Primary)    2. Need for COVID-19 vaccine    3. Need for immunization against influenza        Follow Up:   Return in about 1 year (around 11/2/2022) for Medicare Wellness.     An After Visit Summary and PPPS were made available to the patient.

## 2021-11-12 ENCOUNTER — OFFICE VISIT (OUTPATIENT)
Dept: FAMILY MEDICINE CLINIC | Facility: CLINIC | Age: 76
End: 2021-11-12

## 2021-11-12 VITALS
DIASTOLIC BLOOD PRESSURE: 86 MMHG | TEMPERATURE: 95.6 F | SYSTOLIC BLOOD PRESSURE: 160 MMHG | WEIGHT: 95.8 LBS | HEART RATE: 88 BPM | HEIGHT: 62 IN | BODY MASS INDEX: 17.63 KG/M2

## 2021-11-12 DIAGNOSIS — S43.202A SUBLUXATION OF LEFT STERNOCLAVICULAR JOINT, INITIAL ENCOUNTER: ICD-10-CM

## 2021-11-12 DIAGNOSIS — I10 ESSENTIAL (PRIMARY) HYPERTENSION: Chronic | ICD-10-CM

## 2021-11-12 DIAGNOSIS — L82.0 SEBORRHEIC KERATOSES, INFLAMED: Primary | ICD-10-CM

## 2021-11-12 PROCEDURE — 17110 DESTRUCTION B9 LES UP TO 14: CPT | Performed by: INTERNAL MEDICINE

## 2021-11-12 PROCEDURE — 99213 OFFICE O/P EST LOW 20 MIN: CPT | Performed by: INTERNAL MEDICINE

## 2021-11-12 NOTE — PROGRESS NOTES
"Chief Complaint  knot (on neck. Stajaja has knot plus a lesion on the skin that has been there x 1 month and burns )    Subjective          History of Present Illness     Court Johnson presents to the clinic for evaluation of a knot on her neck, which appears to be a prominent left sternoclavicular joint, for which she is given reassurance.  She is very thin and small framed.  She also has a keratinized seborrheic keratosis at the top of the sternum, which is destroyed with cryotherapy today.  She has been having a burning discomfort with the lesion.  She tolerated procedure well.      Blood pressure was above goal today, but she seemed a little nervous about having a procedure.  She is compliant with lisinopril.      Objective   Vital Signs:   /86   Pulse 88   Temp 95.6 °F (35.3 °C) (Tympanic)   Ht 157.5 cm (62\")   Wt 43.5 kg (95 lb 12.8 oz)   BMI 17.52 kg/m²         Physical Exam  Constitutional:       General: She is not in acute distress.     Appearance: She is well-developed.      Comments: Pleasant female, small framed and thin.    HENT:      Head: Normocephalic and atraumatic.      Nose: Nose normal.      Mouth/Throat:      Pharynx: No oropharyngeal exudate.   Eyes:      Pupils: Pupils are equal, round, and reactive to light.   Neck:      Thyroid: No thyromegaly.      Vascular: No JVD.   Cardiovascular:      Rate and Rhythm: Normal rate and regular rhythm.      Heart sounds: Normal heart sounds.   Pulmonary:      Effort: Pulmonary effort is normal. No accessory muscle usage or respiratory distress.      Breath sounds: Normal breath sounds. No wheezing or rales.   Abdominal:      General: Bowel sounds are normal. There is no distension.      Palpations: Abdomen is soft.      Tenderness: There is no abdominal tenderness.   Musculoskeletal:      Cervical back: Neck supple.      Comments: Prominent left sternoclavicular joint.     Lymphadenopathy:      Cervical: No cervical adenopathy.   Skin:     " Comments: Keratinized inflamed SK at the top of the sternum.   Neurological:      Mental Status: She is alert and oriented to person, place, and time.      Cranial Nerves: No cranial nerve deficit.   Psychiatric:         Speech: Speech normal.         Behavior: Behavior normal.         Thought Content: Thought content normal.         Judgment: Judgment normal.        Future Appointments   Date Time Provider Department Center   4/5/2022 10:30 AM Andres Heart MD [facility]   7/11/2022 10:30 AM [facility] OP INFU CHAIR 12 [facility] OPI [facility]       Result Review :                 Assessment and Plan    Diagnoses and all orders for this visit:    1. Seborrheic keratoses, inflamed (Primary)    2. Essential (primary) hypertension    3. Subluxation of left sternoclavicular joint, initial encounter           Cryotherapy, Skin Lesion    Date/Time: 11/12/2021 1:30 PM  Performed by: Andres Heart MD  Authorized by: Andres Heart MD   Preparation: Patient was prepped and draped in the usual sterile fashion.  Local anesthesia used: yes    Anesthesia:  Local anesthesia used: yes  Local Anesthetic: lidocaine 1% with epinephrine  Anesthetic total: 0.5 mL    Sedation:  Patient sedated: no    Patient tolerance: patient tolerated the procedure well with no immediate complications  Comments: After informed verbal consent, Local anesthesia using lidocaine 1% with epinephrine was used due to how tender the area was. Cryotherapy is used to freeze the keratinized portion, which I then manually broke off and then repeated cryotherapy to the base of the skin lesion without difficulty or complication.  Total procedure time: 7 minutes            If this skin lesion does not resolve or continues to grow, we will refer to general surgery or dermatology for excision.       She is given reassurance on the prominent of the left sternoclavicular joint, consistent with subluxation.   She is thin and small framed, so it appears more  prominent    Continue current hypertension management with lisinopril for now.        Return in April for routine follow up with fasting labs one week prior or sooner if needed.     Scribed for Dr. Heart by Josef Beckman.     Follow Up   Return if symptoms worsen or fail to improve, for Next scheduled follow up.  Patient was given instructions and counseling regarding her condition or for health maintenance advice. Please see specific information pulled into the AVS if appropriate.

## 2022-02-14 ENCOUNTER — LAB (OUTPATIENT)
Dept: LAB | Facility: OTHER | Age: 77
End: 2022-02-14

## 2022-02-14 ENCOUNTER — OFFICE VISIT (OUTPATIENT)
Dept: FAMILY MEDICINE CLINIC | Facility: CLINIC | Age: 77
End: 2022-02-14

## 2022-02-14 VITALS
TEMPERATURE: 96 F | DIASTOLIC BLOOD PRESSURE: 58 MMHG | HEIGHT: 62 IN | HEART RATE: 92 BPM | WEIGHT: 94.6 LBS | SYSTOLIC BLOOD PRESSURE: 138 MMHG | BODY MASS INDEX: 17.41 KG/M2

## 2022-02-14 DIAGNOSIS — I10 ESSENTIAL (PRIMARY) HYPERTENSION: Chronic | ICD-10-CM

## 2022-02-14 DIAGNOSIS — M10.9 ACUTE GOUT OF LEFT WRIST, UNSPECIFIED CAUSE: ICD-10-CM

## 2022-02-14 DIAGNOSIS — M19.90 INFLAMMATORY ARTHRITIS: ICD-10-CM

## 2022-02-14 DIAGNOSIS — M19.90 INFLAMMATORY ARTHRITIS: Primary | ICD-10-CM

## 2022-02-14 LAB
ALBUMIN SERPL-MCNC: 4.2 G/DL (ref 3.5–5)
ALBUMIN/GLOB SERPL: 1.1 G/DL (ref 1.1–1.8)
ALP SERPL-CCNC: 91 U/L (ref 38–126)
ALT SERPL W P-5'-P-CCNC: 13 U/L
ANION GAP SERPL CALCULATED.3IONS-SCNC: 7 MMOL/L (ref 5–15)
AST SERPL-CCNC: 24 U/L (ref 14–36)
BILIRUB SERPL-MCNC: 0.3 MG/DL (ref 0.2–1.3)
BUN SERPL-MCNC: 17 MG/DL (ref 7–23)
BUN/CREAT SERPL: 19.5 (ref 7–25)
CALCIUM SPEC-SCNC: 9.5 MG/DL (ref 8.4–10.2)
CHLORIDE SERPL-SCNC: 103 MMOL/L (ref 101–112)
CO2 SERPL-SCNC: 29 MMOL/L (ref 22–30)
CREAT SERPL-MCNC: 0.87 MG/DL (ref 0.52–1.04)
ERYTHROCYTE [SEDIMENTATION RATE] IN BLOOD: 48 MM/HR (ref 0–20)
GFR SERPL CREATININE-BSD FRML MDRD: 63 ML/MIN/1.73 (ref 39–90)
GLOBULIN UR ELPH-MCNC: 3.7 GM/DL (ref 2.3–3.5)
GLUCOSE SERPL-MCNC: 105 MG/DL (ref 70–99)
POTASSIUM SERPL-SCNC: 4.9 MMOL/L (ref 3.4–5)
PROT SERPL-MCNC: 7.9 G/DL (ref 6.3–8.6)
RHEUMATOID FACT SERPL-ACNC: NEGATIVE [IU]/ML
SODIUM SERPL-SCNC: 139 MMOL/L (ref 137–145)
URATE SERPL-MCNC: 4.1 MG/DL (ref 2.5–6.2)

## 2022-02-14 PROCEDURE — 86430 RHEUMATOID FACTOR TEST QUAL: CPT | Performed by: INTERNAL MEDICINE

## 2022-02-14 PROCEDURE — 86140 C-REACTIVE PROTEIN: CPT | Performed by: INTERNAL MEDICINE

## 2022-02-14 PROCEDURE — 36415 COLL VENOUS BLD VENIPUNCTURE: CPT | Performed by: INTERNAL MEDICINE

## 2022-02-14 PROCEDURE — 84550 ASSAY OF BLOOD/URIC ACID: CPT | Performed by: INTERNAL MEDICINE

## 2022-02-14 PROCEDURE — 99213 OFFICE O/P EST LOW 20 MIN: CPT | Performed by: INTERNAL MEDICINE

## 2022-02-14 PROCEDURE — 80053 COMPREHEN METABOLIC PANEL: CPT | Performed by: INTERNAL MEDICINE

## 2022-02-14 PROCEDURE — 85651 RBC SED RATE NONAUTOMATED: CPT | Performed by: INTERNAL MEDICINE

## 2022-02-14 RX ORDER — LISINOPRIL 20 MG/1
TABLET ORAL
Qty: 90 TABLET | Refills: 3 | Status: SHIPPED | OUTPATIENT
Start: 2022-02-14 | End: 2022-04-26 | Stop reason: SDUPTHER

## 2022-02-14 RX ORDER — MELOXICAM 15 MG/1
15 TABLET ORAL DAILY PRN
Qty: 30 TABLET | Refills: 0 | Status: SHIPPED | OUTPATIENT
Start: 2022-02-14 | End: 2022-09-07

## 2022-02-14 RX ORDER — PREDNISONE 10 MG/1
TABLET ORAL
Qty: 24 TABLET | Refills: 0 | Status: SHIPPED | OUTPATIENT
Start: 2022-02-14 | End: 2022-04-05

## 2022-02-14 NOTE — PROGRESS NOTES
"Chief Complaint  Hand Pain (left and swelling x 5 days. No injury known . She has been taking OTC Tylenol)    Subjective          History of Present Illness     Court Johnson presents the office reporting onset of left wrist pain five days ago on Wednesday evening with development of  swelling by the following morning.  Symptoms have started to mildly improve since onset.   Range of motion is limited.  Denies injury or trauma.  She has taken Tylenol and applied topical arthritis cream.  She tried heating pad, but switched to ice.  Her symptoms appear to be consistent with palindromic arthritis versus gouty arthritis.         Objective   Vital Signs:   /58   Pulse 92   Temp 96 °F (35.6 °C) (Tympanic)   Ht 157.5 cm (62\")   Wt 42.9 kg (94 lb 9.6 oz)   BMI 17.30 kg/m²       Physical Exam  Constitutional:       General: She is not in acute distress.     Appearance: She is well-developed.      Comments: Pleasant female.    HENT:      Head: Normocephalic and atraumatic.      Nose: Nose normal.      Mouth/Throat:      Pharynx: No oropharyngeal exudate.   Eyes:      Pupils: Pupils are equal, round, and reactive to light.   Neck:      Thyroid: No thyromegaly.      Vascular: No JVD.   Cardiovascular:      Rate and Rhythm: Normal rate and regular rhythm.      Heart sounds: Normal heart sounds.   Pulmonary:      Effort: Pulmonary effort is normal. No accessory muscle usage or respiratory distress.      Breath sounds: Normal breath sounds. No wheezing or rales.   Abdominal:      General: Bowel sounds are normal. There is no distension.      Palpations: Abdomen is soft.      Tenderness: There is no abdominal tenderness.   Musculoskeletal:      Cervical back: Neck supple.      Comments: Exam of left wrist reveals warmth, mild redness, and moderate swelling of left wrist and hand out to the fingers.  Limited range of motion of the left wrist       Lymphadenopathy:      Cervical: No cervical adenopathy. "   Neurological:      Mental Status: She is alert and oriented to person, place, and time.      Cranial Nerves: No cranial nerve deficit.   Psychiatric:         Speech: Speech normal.         Behavior: Behavior normal.         Thought Content: Thought content normal.         Judgment: Judgment normal.            Result Review :     Common labs    Common Labsle 3/8/21 3/8/21 3/8/21 9/21/21 9/21/21 2/14/22 2/14/22    0934 0934 0934 0915 0915 1503 1503   Glucose  84   93 105 (A)    BUN  13   13 17    Creatinine  0.84   0.90 0.87    eGFR Non  Am  66   61 63    Sodium  140   140 139    Potassium  4.4   4.2 4.9    Chloride  104   105 103    Calcium  9.5   9.4 9.5    Albumin  4.20   4.10 4.20    Total Bilirubin  0.5   0.4 0.3    Alkaline Phosphatase  100   82 91    AST (SGOT)  36   22 24    ALT (SGPT)  20   14 13    WBC 4.12   5.31      Hemoglobin 12.3   12.2      Hematocrit 37.8   37.2      Platelets 261   253      Total Cholesterol   213 (A)       Triglycerides   99       HDL Cholesterol   72 (A)       LDL Cholesterol    124 (A)       Uric Acid       4.1   (A) Abnormal value          Sed rate 48 today      Uric Acid    Common Labsle 2/14/22   Uric Acid 4.1                  Future Appointments   Date Time Provider Department Center   4/5/2022 10:30 AM Andres Heart MD [facility]   7/11/2022 10:30 AM [facility] OP INFU CHAIR 12 [facility] OPI [facility]        Assessment and Plan    Diagnoses and all orders for this visit:    1. Inflammatory arthritis (Primary)  -     Comprehensive Metabolic Panel; Future  -     Sedimentation rate; Future  -     C-reactive protein; Future  -     Rheumatoid factor; Future  -     Uric Acid; Future    2. Acute gout of left wrist, unspecified cause  -     Comprehensive Metabolic Panel; Future  -     Uric Acid; Future    3. Essential (primary) hypertension    Other orders  -     meloxicam (MOBIC) 15 MG tablet; Take 1 tablet by mouth Daily As Needed (pain). Take with food  Dispense: 30 tablet;  Refill: 0  -     predniSONE (DELTASONE) 10 MG tablet; One p.o. 3 times daily for 4 days, then one p.o. twice daily for 4 days, then one p.o. daily for 4 days  Dispense: 24 tablet; Refill: 0         I spent 24 minutes caring for Court on this date of service. This time includes time spent by me in the following activities:preparing for the visit, reviewing tests, obtaining and/or reviewing a separately obtained history, performing a medically appropriate examination and/or evaluation , counseling and educating the patient/family/caregiver, ordering medications, tests, or procedures and documenting information in the medical record     Orders are placed for patient to stop by the lab today for CMP, sed rate, C reactive protein, uric acid, and rheumatoid factor, some of which are back with results summarized above. For the inflammatory arthritis versus gouty arthritis, I believe the low uric acid level rules out gout.  Differential diagnosis includes inflammatory arthritis, palindromic rheumatoid arthritis.  C-reactive protein is pending.  We will notify patient with results.  I sent prescriptions for Mobic 15 mg q.d. with food and prednisone taper to take as directed with food.   She can continue the OTC topical arthritis cream, but would avoid much in the way of heat.  She may apply some cool compresses episodically if that helps.  Notify me if this is not significantly better by next week.    Return in April for routine follow up with fasting labs one week prior or sooner if no improvement in left hand pain and swelling.    Continue the lisinopril 20 mg daily.  Pursue sodium restriction and weight loss.    Scribed for Dr. Heart by Olesya Bruno [facility].     Follow Up   Return if symptoms worsen or fail to improve, for Next scheduled follow up.  Patient was given instructions and counseling regarding her condition or for health maintenance advice. Please see specific information pulled into the AVS if  appropriate.

## 2022-02-15 LAB — CRP SERPL-MCNC: 1.89 MG/DL (ref 0–0.5)

## 2022-03-30 ENCOUNTER — LAB (OUTPATIENT)
Dept: LAB | Facility: OTHER | Age: 77
End: 2022-03-30

## 2022-03-30 DIAGNOSIS — D51.9 ANEMIA DUE TO VITAMIN B12 DEFICIENCY, UNSPECIFIED B12 DEFICIENCY TYPE: Chronic | ICD-10-CM

## 2022-03-30 DIAGNOSIS — J43.2 CENTRILOBULAR EMPHYSEMA: Chronic | ICD-10-CM

## 2022-03-30 DIAGNOSIS — M81.6 LOCALIZED OSTEOPOROSIS WITHOUT CURRENT PATHOLOGICAL FRACTURE: Chronic | ICD-10-CM

## 2022-03-30 DIAGNOSIS — I10 ESSENTIAL (PRIMARY) HYPERTENSION: Chronic | ICD-10-CM

## 2022-03-30 DIAGNOSIS — E55.9 VITAMIN D DEFICIENCY: Chronic | ICD-10-CM

## 2022-03-30 LAB
25(OH)D3 SERPL-MCNC: 50.9 NG/ML (ref 30–100)
ALBUMIN SERPL-MCNC: 3.9 G/DL (ref 3.5–5)
ALBUMIN/GLOB SERPL: 1.1 G/DL (ref 1.1–1.8)
ALP SERPL-CCNC: 100 U/L (ref 38–126)
ALT SERPL W P-5'-P-CCNC: 13 U/L
ANION GAP SERPL CALCULATED.3IONS-SCNC: 7 MMOL/L (ref 5–15)
AST SERPL-CCNC: 23 U/L (ref 14–36)
BASOPHILS # BLD AUTO: 0.06 10*3/MM3 (ref 0–0.2)
BASOPHILS NFR BLD AUTO: 1.1 % (ref 0–1.5)
BILIRUB SERPL-MCNC: 0.4 MG/DL (ref 0.2–1.3)
BUN SERPL-MCNC: 17 MG/DL (ref 7–23)
BUN/CREAT SERPL: 21.3 (ref 7–25)
CALCIUM SPEC-SCNC: 9.4 MG/DL (ref 8.4–10.2)
CHLORIDE SERPL-SCNC: 105 MMOL/L (ref 101–112)
CHOLEST SERPL-MCNC: 166 MG/DL (ref 150–200)
CO2 SERPL-SCNC: 27 MMOL/L (ref 22–30)
CREAT SERPL-MCNC: 0.8 MG/DL (ref 0.52–1.04)
DEPRECATED RDW RBC AUTO: 44 FL (ref 37–54)
EGFRCR SERPLBLD CKD-EPI 2021: 76.5 ML/MIN/1.73
EOSINOPHIL # BLD AUTO: 0.24 10*3/MM3 (ref 0–0.4)
EOSINOPHIL NFR BLD AUTO: 4.4 % (ref 0.3–6.2)
ERYTHROCYTE [DISTWIDTH] IN BLOOD BY AUTOMATED COUNT: 13.8 % (ref 12.3–15.4)
GLOBULIN UR ELPH-MCNC: 3.6 GM/DL (ref 2.3–3.5)
GLUCOSE SERPL-MCNC: 98 MG/DL (ref 70–99)
HCT VFR BLD AUTO: 34.1 % (ref 34–46.6)
HDLC SERPL-MCNC: 65 MG/DL (ref 40–59)
HGB BLD-MCNC: 10.8 G/DL (ref 12–15.9)
LDLC SERPL CALC-MCNC: 85 MG/DL
LDLC/HDLC SERPL: 1.29 {RATIO} (ref 0–3.22)
LYMPHOCYTES # BLD AUTO: 1.82 10*3/MM3 (ref 0.7–3.1)
LYMPHOCYTES NFR BLD AUTO: 33.4 % (ref 19.6–45.3)
MCH RBC QN AUTO: 28 PG (ref 26.6–33)
MCHC RBC AUTO-ENTMCNC: 31.7 G/DL (ref 31.5–35.7)
MCV RBC AUTO: 88.3 FL (ref 79–97)
MONOCYTES # BLD AUTO: 0.58 10*3/MM3 (ref 0.1–0.9)
MONOCYTES NFR BLD AUTO: 10.6 % (ref 5–12)
NEUTROPHILS NFR BLD AUTO: 2.75 10*3/MM3 (ref 1.7–7)
NEUTROPHILS NFR BLD AUTO: 50.5 % (ref 42.7–76)
PLATELET # BLD AUTO: 337 10*3/MM3 (ref 140–450)
PMV BLD AUTO: 9.1 FL (ref 6–12)
POTASSIUM SERPL-SCNC: 3.9 MMOL/L (ref 3.4–5)
PROT SERPL-MCNC: 7.5 G/DL (ref 6.3–8.6)
RBC # BLD AUTO: 3.86 10*6/MM3 (ref 3.77–5.28)
SODIUM SERPL-SCNC: 139 MMOL/L (ref 137–145)
TRIGL SERPL-MCNC: 85 MG/DL
VIT B12 BLD-MCNC: 715 PG/ML (ref 211–946)
VLDLC SERPL-MCNC: 16 MG/DL (ref 5–40)
WBC NRBC COR # BLD: 5.45 10*3/MM3 (ref 3.4–10.8)

## 2022-03-30 PROCEDURE — 80061 LIPID PANEL: CPT | Performed by: INTERNAL MEDICINE

## 2022-03-30 PROCEDURE — 80053 COMPREHEN METABOLIC PANEL: CPT | Performed by: INTERNAL MEDICINE

## 2022-03-30 PROCEDURE — 82607 VITAMIN B-12: CPT | Performed by: INTERNAL MEDICINE

## 2022-03-30 PROCEDURE — 82306 VITAMIN D 25 HYDROXY: CPT | Performed by: INTERNAL MEDICINE

## 2022-03-30 PROCEDURE — 85025 COMPLETE CBC W/AUTO DIFF WBC: CPT | Performed by: INTERNAL MEDICINE

## 2022-03-30 PROCEDURE — 36415 COLL VENOUS BLD VENIPUNCTURE: CPT | Performed by: INTERNAL MEDICINE

## 2022-04-05 ENCOUNTER — LAB (OUTPATIENT)
Dept: LAB | Facility: OTHER | Age: 77
End: 2022-04-05

## 2022-04-05 ENCOUNTER — OFFICE VISIT (OUTPATIENT)
Dept: FAMILY MEDICINE CLINIC | Facility: CLINIC | Age: 77
End: 2022-04-05

## 2022-04-05 VITALS
TEMPERATURE: 96 F | HEIGHT: 62 IN | OXYGEN SATURATION: 93 % | WEIGHT: 93.2 LBS | BODY MASS INDEX: 17.15 KG/M2 | DIASTOLIC BLOOD PRESSURE: 70 MMHG | SYSTOLIC BLOOD PRESSURE: 128 MMHG | HEART RATE: 75 BPM

## 2022-04-05 DIAGNOSIS — M81.6 LOCALIZED OSTEOPOROSIS WITHOUT CURRENT PATHOLOGICAL FRACTURE: Chronic | ICD-10-CM

## 2022-04-05 DIAGNOSIS — J06.9 VIRAL URI WITH COUGH: ICD-10-CM

## 2022-04-05 DIAGNOSIS — D64.9 NORMOCYTIC ANEMIA: Chronic | ICD-10-CM

## 2022-04-05 DIAGNOSIS — R05.3 CHRONIC COUGH: ICD-10-CM

## 2022-04-05 DIAGNOSIS — I10 ESSENTIAL (PRIMARY) HYPERTENSION: Primary | Chronic | ICD-10-CM

## 2022-04-05 DIAGNOSIS — Z12.11 COLON CANCER SCREENING: ICD-10-CM

## 2022-04-05 DIAGNOSIS — R63.4 UNINTENTIONAL WEIGHT LOSS: ICD-10-CM

## 2022-04-05 DIAGNOSIS — D51.9 ANEMIA DUE TO VITAMIN B12 DEFICIENCY, UNSPECIFIED B12 DEFICIENCY TYPE: Chronic | ICD-10-CM

## 2022-04-05 DIAGNOSIS — R19.8 GENERALIZED ABDOMINAL FULLNESS: ICD-10-CM

## 2022-04-05 DIAGNOSIS — J20.9 ACUTE BRONCHITIS, UNSPECIFIED ORGANISM: ICD-10-CM

## 2022-04-05 LAB
FLUAV AG NPH QL: NEGATIVE
FLUBV AG NPH QL IA: NEGATIVE
SARS-COV-2 N GENE RESP QL NAA+PROBE: NOT DETECTED

## 2022-04-05 PROCEDURE — 99215 OFFICE O/P EST HI 40 MIN: CPT | Performed by: INTERNAL MEDICINE

## 2022-04-05 PROCEDURE — 87804 INFLUENZA ASSAY W/OPTIC: CPT | Performed by: INTERNAL MEDICINE

## 2022-04-05 PROCEDURE — 87635 SARS-COV-2 COVID-19 AMP PRB: CPT | Performed by: INTERNAL MEDICINE

## 2022-04-05 RX ORDER — BENZONATATE 200 MG/1
200 CAPSULE ORAL 3 TIMES DAILY PRN
Qty: 30 CAPSULE | Refills: 0 | Status: SHIPPED | OUTPATIENT
Start: 2022-04-05 | End: 2022-04-26

## 2022-04-05 RX ORDER — AZITHROMYCIN 250 MG/1
TABLET, FILM COATED ORAL
Qty: 6 TABLET | Refills: 0 | Status: SHIPPED | OUTPATIENT
Start: 2022-04-05 | End: 2022-04-26

## 2022-04-05 NOTE — PROGRESS NOTES
Chief Complaint  Follow-up (6 month), Shortness of Breath (X 1 week . Not using inhaler or Claritin ), Cough (X 1 week non productive ), Fall (Fell at home 03/03/2022 and went to [facility] ER 03/04 and was determined she had fractured right ulna. Dr. Cai has caring for this ), and Osteoporosis (Last Reclast    07/07/2021)    Subjective          History of Present Illness     Court Johnson presents to the office for 6-month follow-up of hypertension, vitamin D deficiency, osteoporosis, anemia due to vitamin B12 deficiency, seasonal allergic rhinitis and other issues    Patient reports nonproductive episodic chronic cough, mostly daytime cough with chronic dyspnea this winter worsening the past week and nasal congestion for the past week. Denies other sick contacts. She has taken an OTC cold medicine at nighttime.  She is running a vaporizer in the home. She has been vaccinated and boosted for COVID, although, booster was given 5 months ago.  We checked flu and COVID screens today in the office. She has bilateral rhonchi on exam today and has also experienced unexplained weight loss, for which we will get CT of the chest to further evaluate. .      Patient had an elderly fall at home one month ago.  She was seen in the ER at [facility] where x-rays revealed closed fracture of right ulna addressed by Dr. Bajwa, orthopedist. DEXA 09/2020 revealed 13% improvement in density of lumbar spine and persistent, although, stable osteoporosis of the left femoral neck. She continues IV Reclast with her most recent infusion 07/07/2021 as well as calcium and vitamin D supplements. Vitamin D and calcium at goal.  Reclast was started in February 2018.    Patient is due repeat colonoscopy and in agreement to schedule.      Weight is down 2 pounds in the past 5 months and down 14 pounds since in person office visit 2 years ago.  Labs also reveal a new normocytic anemia.  She states decreased appetite with recent URI symptoms.  Denies  "bowel changes or visualizing blood in the stool or urine.  Denies history of tobacco use.  Denies dysphagia.  Denies hemoptysis or hematemesis    The patient's relevant past medical, surgical, and social history was reviewed in Epic.   Lab results are reviewed with the patient today. CBC reveals a new normocytic anemia.  Vitamin B-12 at goal with oral vitamin B-12.  Cholesterol at goal with excellent HDL cholesterol 65 giving her a favorable cholesterol ratio.   Normal renal and liver function.      Objective   Vital Signs:   /70   Pulse 75   Temp 96 °F (35.6 °C) (Tympanic)   Ht 157.5 cm (62\")   Wt 42.3 kg (93 lb 3.2 oz)   SpO2 93%   BMI 17.05 kg/m²          Physical Exam  Vitals reviewed.   Constitutional:       General: She is not in acute distress.     Appearance: She is well-developed.      Comments: Very pleasant female.  Somewhat frail   HENT:      Head: Normocephalic and atraumatic.      Nose:      Right Sinus: No maxillary sinus tenderness or frontal sinus tenderness.      Left Sinus: No maxillary sinus tenderness or frontal sinus tenderness.      Mouth/Throat:      Mouth: No oral lesions.      Pharynx: Uvula midline.      Tonsils: No tonsillar exudate.   Eyes:      Conjunctiva/sclera: Conjunctivae normal.      Pupils: Pupils are equal, round, and reactive to light.   Neck:      Thyroid: No thyroid mass or thyromegaly.      Vascular: No carotid bruit or JVD.      Trachea: Trachea normal. No tracheal deviation.   Cardiovascular:      Rate and Rhythm: Normal rate and regular rhythm.  No extrasystoles are present.     Chest Wall: PMI is not displaced.      Heart sounds: Normal heart sounds. No murmur heard.  Pulmonary:      Effort: Pulmonary effort is normal. No accessory muscle usage or respiratory distress.      Breath sounds: Normal breath sounds. No decreased breath sounds, wheezing, rhonchi or rales.      Comments: Bilateral rhonchi.  No wheezes.   Abdominal:      General: Abdomen is flat. " Bowel sounds are normal.      Palpations: Abdomen is soft.      Tenderness: There is no abdominal tenderness. There is no guarding or rebound.      Comments: Possible fullness in epigastric area, unable to rule out mass-effect in the upper abdomen, otherwise unremarkable scaphoid abdomen.     Musculoskeletal:      Cervical back: Neck supple.   Lymphadenopathy:      Cervical: No cervical adenopathy.   Skin:     General: Skin is warm and dry.      Findings: No rash.      Nails: There is no clubbing.   Neurological:      General: No focal deficit present.      Mental Status: She is alert and oriented to person, place, and time. Mental status is at baseline.      Cranial Nerves: No cranial nerve deficit.      Coordination: Coordination normal.   Psychiatric:         Mood and Affect: Mood normal.         Speech: Speech normal.         Behavior: Behavior normal.         Thought Content: Thought content normal.         Judgment: Judgment normal.            Result Review :     CMP    CMP 9/21/21 2/14/22 3/30/22   Glucose 93 105 (A) 98   BUN 13 17 17   Creatinine 0.90 0.87 0.80   eGFR Non African Am 61 63    Sodium 140 139 139   Potassium 4.2 4.9 3.9   Chloride 105 103 105   Calcium 9.4 9.5 9.4   Albumin 4.10 4.20 3.90   Total Bilirubin 0.4 0.3 0.4   Alkaline Phosphatase 82 91 100   AST (SGOT) 22 24 23   ALT (SGPT) 14 13 13   (A) Abnormal value            CBC w/diff    CBC w/Diff 9/21/21 3/30/22   WBC 5.31 5.45   RBC 4.14 3.86   Hemoglobin 12.2 10.8 (A)   Hematocrit 37.2 34.1   MCV 89.9 88.3   MCH 29.5 28.0   MCHC 32.8 31.7   RDW 12.9 13.8   Platelets 253 337   Neutrophil Rel % 45.7 50.5   Lymphocyte Rel % 37.5 33.4   Monocyte Rel % 10.4 10.6   Eosinophil Rel % 5.8 4.4   Basophil Rel % 0.6 1.1   (A) Abnormal value            Lipid Panel    Lipid Panel 3/30/22   Total Cholesterol 166   Triglycerides 85   HDL Cholesterol 65 (A)   VLDL Cholesterol 16   LDL Cholesterol  85   LDL/HDL Ratio 1.29   (A) Abnormal value            TSH     TSH 9/21/21   TSH 0.747               Data reviewed: Radiologic studies  DEXA 09/2020 and Consultant notes Dr. Bajwa, orthopedist          Assessment and Plan    Diagnoses and all orders for this visit:    1. Essential (primary) hypertension (Primary)    2. Anemia due to vitamin B12 deficiency, unspecified B12 deficiency type    3. Localized osteoporosis without current pathological fracture    4. Colon cancer screening  -     Ambulatory Referral to General Surgery    5. Unintentional weight loss  -     Ambulatory Referral to General Surgery  -     CT Chest Without Contrast Diagnostic  -     CT Abdomen Pelvis With Contrast    6. Normocytic anemia  -     Ferritin; Future  -     Iron Profile; Future  -     Folate; Future  -     CT Abdomen Pelvis With Contrast    7. Chronic cough  -     CT Chest Without Contrast Diagnostic    8. Viral URI with cough  -     Influenza Antigen, Rapid - Swab, Nasopharynx  -     COVID-19,  [facility] IN-HOUSE, NP SWAB IN TRANSPORT MEDIA 8-10 HR TAT - Swab, Anterior nasal; Future    9. Acute bronchitis, unspecified organism    10. Generalized abdominal fullness  -     CT Abdomen Pelvis With Contrast    Other orders  -     azithromycin (ZITHROMAX) 250 MG tablet; Take 2 tablets the first day, then 1 tablet daily for 4 days.  Dispense: 6 tablet; Refill: 0  -     benzonatate (TESSALON) 200 MG capsule; Take 1 capsule by mouth 3 (Three) Times a Day As Needed for Cough for up to 30 doses.  Dispense: 30 capsule; Refill: 0         I spent 48 minutes caring for Court on this date of service. This time includes time spent by me in the following activities:preparing for the visit, reviewing tests, obtaining and/or reviewing a separately obtained history, performing a medically appropriate examination and/or evaluation , counseling and educating the patient/family/caregiver, ordering medications, tests, or procedures, referring and communicating with other health care professionals  and documenting  information in the medical record     Refer to Dr. Akash Pete, general surgery, for colonoscopy in this patient with unexplained weight loss and new onset normocytic anemia.  We will check anemia workup on her way out today.   Continue the oral vitamin B12.    Order placed for patient to schedule CT of the chest to evaluate the chronic episodic cough in this patient with unexplained weight loss.  Also CT of the abdomen/pelvis due to the unexplained weight loss and today's exam new onset normocytic anemia.  Flu and COVID screens collected in the office today.  Influenza screen is negative.  Covid screen pending.  We will notify patient with results when available. I sent prescriptions for Z-pack to take as directed and Tessalon Perles to take as directed.  If the Tessalon is too costly, she can continue her OTC medication.      Continue lisinopril for BP.  Monitor BP and notify me if not consistently at goal.      Continue follow up visits as scheduled with Dr. Bajwa, orthopedist, to address right ulnar fracture.      Continue the annual IV Reclast and we will repeat DEXA every 2 years, making her due this fall(09/2022)  Fall precautions. Weightbearing exercises.  Anticipate continuing IV Reclast until 2024.     Return in six months for routine follow up with fasting labs one week prior or sooner if needed.     Follow Up   Return in about 2 months (around 6/5/2022).  Patient was given instructions and counseling regarding her condition or for health maintenance advice. Please see specific information pulled into the AVS if appropriate.

## 2022-04-07 ENCOUNTER — LAB (OUTPATIENT)
Dept: LAB | Facility: OTHER | Age: 77
End: 2022-04-07

## 2022-04-07 DIAGNOSIS — D64.9 NORMOCYTIC ANEMIA: Chronic | ICD-10-CM

## 2022-04-07 PROCEDURE — 36415 COLL VENOUS BLD VENIPUNCTURE: CPT | Performed by: INTERNAL MEDICINE

## 2022-04-07 PROCEDURE — 83540 ASSAY OF IRON: CPT | Performed by: INTERNAL MEDICINE

## 2022-04-07 PROCEDURE — 84466 ASSAY OF TRANSFERRIN: CPT | Performed by: INTERNAL MEDICINE

## 2022-04-07 PROCEDURE — 82746 ASSAY OF FOLIC ACID SERUM: CPT | Performed by: INTERNAL MEDICINE

## 2022-04-07 PROCEDURE — 82728 ASSAY OF FERRITIN: CPT | Performed by: INTERNAL MEDICINE

## 2022-04-08 DIAGNOSIS — D64.9 NORMOCYTIC ANEMIA: Primary | ICD-10-CM

## 2022-04-08 DIAGNOSIS — D50.8 IRON DEFICIENCY ANEMIA SECONDARY TO INADEQUATE DIETARY IRON INTAKE: ICD-10-CM

## 2022-04-08 LAB
FERRITIN SERPL-MCNC: 82.4 NG/ML (ref 13–150)
FOLATE SERPL-MCNC: 7.6 NG/ML (ref 4.78–24.2)
IRON 24H UR-MRATE: 29 MCG/DL (ref 37–145)
IRON SATN MFR SERPL: 7 % (ref 20–50)
TIBC SERPL-MCNC: 434 MCG/DL (ref 298–536)
TRANSFERRIN SERPL-MCNC: 291 MG/DL (ref 200–360)

## 2022-04-08 RX ORDER — PREDNISONE 10 MG/1
10 TABLET ORAL DAILY
Qty: 20 TABLET | Refills: 0 | Status: SHIPPED | OUTPATIENT
Start: 2022-04-08 | End: 2022-04-26

## 2022-04-08 RX ORDER — FERROUS SULFATE 325(65) MG
325 TABLET ORAL
Qty: 30 TABLET | Refills: 2 | Status: SHIPPED | OUTPATIENT
Start: 2022-04-08 | End: 2022-06-20 | Stop reason: ALTCHOICE

## 2022-04-08 NOTE — PROGRESS NOTES
I reviewed results of the CT scan of chest, abdomen, and pelvis with Court.  I also reviewed the results of her anemia work-up, which revealed iron deficiency anemia.  We have referred her to Dr. Pete for GI work-up in light of the new iron deficiency anemia and unexplained weight loss.  There is no evidence of cancer on the imaging, but extensive patchy groundglass appearance of both lungs, suggestive of COVID lungs.  I have sent prescription for iron replacement and also 20 days of low-dose prednisone to [facility] pharmacy.  The patient voices understanding.  I will see her back in the office June 13 with nonfasting labs prior to reassess the iron deficiency anemia.  Unless she makes a complete recovery clinically, will we will likely repeat the CT scan of the chest this summer and refer to pulmonology if indicated.  EB

## 2022-04-11 ENCOUNTER — TELEPHONE (OUTPATIENT)
Dept: FAMILY MEDICINE CLINIC | Facility: CLINIC | Age: 77
End: 2022-04-11

## 2022-04-11 RX ORDER — ALBUTEROL SULFATE 90 UG/1
2 AEROSOL, METERED RESPIRATORY (INHALATION) 4 TIMES DAILY PRN
Qty: 8 G | Refills: 5 | Status: SHIPPED | OUTPATIENT
Start: 2022-04-11

## 2022-04-26 ENCOUNTER — OFFICE VISIT (OUTPATIENT)
Dept: FAMILY MEDICINE CLINIC | Facility: CLINIC | Age: 77
End: 2022-04-26

## 2022-04-26 VITALS
BODY MASS INDEX: 16.52 KG/M2 | DIASTOLIC BLOOD PRESSURE: 50 MMHG | HEART RATE: 89 BPM | TEMPERATURE: 96 F | HEIGHT: 62 IN | WEIGHT: 89.8 LBS | OXYGEN SATURATION: 99 % | SYSTOLIC BLOOD PRESSURE: 104 MMHG

## 2022-04-26 DIAGNOSIS — I10 ESSENTIAL (PRIMARY) HYPERTENSION: Chronic | ICD-10-CM

## 2022-04-26 DIAGNOSIS — R63.4 UNINTENTIONAL WEIGHT LOSS: ICD-10-CM

## 2022-04-26 DIAGNOSIS — I05.8 MITRAL VALVE FAILURE: ICD-10-CM

## 2022-04-26 DIAGNOSIS — I50.21 ACUTE SYSTOLIC CHF (CONGESTIVE HEART FAILURE): ICD-10-CM

## 2022-04-26 DIAGNOSIS — D50.8 IRON DEFICIENCY ANEMIA SECONDARY TO INADEQUATE DIETARY IRON INTAKE: ICD-10-CM

## 2022-04-26 DIAGNOSIS — Z09 HOSPITAL DISCHARGE FOLLOW-UP: Primary | ICD-10-CM

## 2022-04-26 PROCEDURE — 99215 OFFICE O/P EST HI 40 MIN: CPT | Performed by: INTERNAL MEDICINE

## 2022-04-26 RX ORDER — FUROSEMIDE 20 MG/1
20 TABLET ORAL DAILY
COMMUNITY
Start: 2022-04-16

## 2022-04-26 RX ORDER — CARVEDILOL 3.12 MG/1
TABLET ORAL
Qty: 30 TABLET | Refills: 6 | COMMUNITY
Start: 2022-04-26 | End: 2022-06-20 | Stop reason: ALTCHOICE

## 2022-04-26 RX ORDER — LISINOPRIL 5 MG/1
5 TABLET ORAL 2 TIMES DAILY
Qty: 60 TABLET | Refills: 5 | Status: SHIPPED | OUTPATIENT
Start: 2022-04-26 | End: 2022-06-20 | Stop reason: ALTCHOICE

## 2022-04-26 RX ORDER — CARVEDILOL 3.12 MG/1
3.12 TABLET ORAL 2 TIMES DAILY WITH MEALS
COMMUNITY
End: 2022-04-26 | Stop reason: SDUPTHER

## 2022-04-26 NOTE — PROGRESS NOTES
Chief Complaint  Follow-up (Post hospital at [place] for CHF and mitral valve problems. 04/13/2022-04/16/2022/She states she feels much better. Her b/p stays low and family has been doing Coreg once daily instead of twice. She wants to be referred to Dr. Tushar Sandhu cardiologist at [facility])    Subjective          History of Present Illness     Court Johnson is our very pleasant 77-year-old female who has developed several complex chronic medical issues and we have been evaluating unintentional weight loss.  Please see my last note for summary.  She presents to the office today accompanied by her daughter, Selena Velazquez, who lives in [place].  Selena's  is a  at [employer] in [facility].  The patient was hospitalized at [facility] in [place] 04/13/2022 through 04/16/2022 for an acute CHF exacerbation and was found to have rather severe mitral valve issues/failure.  The do not know the results of the echocardiogram regarding ejection fraction, but were told that the mitral valve had become a problem.  She has a long history of mitral valve regurgitation.  We do not have a copy of hospital notes at time of visit, but we have requested hospital records. Prior to her hospitalization, patient was reporting chronic episodic cough with unexplained weight loss, for which we had obtained CT scan of chest, abdomen, and pelvis as well as anemia work-up, which revealed iron deficiency anemia.  She also has a vitamin B12 deficiency anemia. There was no evidence of cancer on the imaging, but extensive patchy groundglass appearance of both lungs, suggestive of COVID lungs or some other viral syndrome.  Not the classic appearance of pulmonary edema. We started patient on iron replacement and also 20 days of low-dose prednisone to help with her respiratory status.       Patient's daughter reports ECHO while hospitalized revealed severe mitral valve problems and  "recommended cardiology follow up.  She was given diuretics to treat the CHF exacerbation and discharged with oral Lasix, which she has not required.  They started low-dose Coreg to slow the heart rate somewhat on an reduced the lisinopril dose to allow for the diuretic and Coreg. They are requesting to be referred to Tushar Sandhu, cardiologist, at [facility] in [place].  Her grandaujessicater is employed in cardiology department at the [employer] and may be able to expedite her appointment.  BP has been running on the low side, for which she has only been taking one dose of Coreg mid-day and decreased dose of lisinopril 10 mg daily.  HR is running on the high side at 89.    She denies any recent issues with PND or orthopnea.  There is still some fatigue and shortness of breath with exertion, but no respiratory distress or shortness of breath at rest.    Objective   Vital Signs:   /50   Pulse 89   Temp 96 °F (35.6 °C) (Tympanic)   Ht 157.5 cm (62\")   Wt 40.7 kg (89 lb 12.8 oz)   SpO2 99%   BMI 16.42 kg/m²       Physical Exam  Vitals reviewed.   Constitutional:       General: She is not in acute distress.     Appearance: She is well-developed.      Comments: Pleasant female accompanied by daughter, Selena Velazquez.      HENT:      Head: Normocephalic and atraumatic.      Nose:      Right Sinus: No maxillary sinus tenderness or frontal sinus tenderness.      Left Sinus: No maxillary sinus tenderness or frontal sinus tenderness.      Mouth/Throat:      Mouth: No oral lesions.      Pharynx: Uvula midline.      Tonsils: No tonsillar exudate.   Eyes:      Conjunctiva/sclera: Conjunctivae normal.      Pupils: Pupils are equal, round, and reactive to light.   Neck:      Thyroid: No thyroid mass or thyromegaly.      Vascular: No carotid bruit or JVD.      Trachea: Trachea normal. No tracheal deviation.   Cardiovascular:      Rate and Rhythm: Normal rate and regular rhythm.  No extrasystoles are " present.     Chest Wall: PMI is not displaced.      Heart sounds: Normal heart sounds. No murmur heard.     Comments: 4/6 systolic murmur heard best over mitral area.  Pulmonary:      Effort: Pulmonary effort is normal. No accessory muscle usage or respiratory distress.      Breath sounds: No decreased breath sounds, wheezing, rhonchi or rales.      Comments: Somewhat shallow inspirations limit exam, but only some mild interstitial lung sounds.  No diminished breath sounds at the bases or bibasilar crackles appreciated.  No wheezes.  No rhonchi  Abdominal:      General: Bowel sounds are normal. There is no distension.      Palpations: Abdomen is soft.      Tenderness: There is no abdominal tenderness.   Musculoskeletal:      Cervical back: Neck supple.   Lymphadenopathy:      Cervical: No cervical adenopathy.   Skin:     General: Skin is warm and dry.      Findings: No rash.      Nails: There is no clubbing.   Neurological:      General: No focal deficit present.      Mental Status: She is alert and oriented to person, place, and time. Mental status is at baseline.      Cranial Nerves: No cranial nerve deficit.      Coordination: Coordination normal.   Psychiatric:         Mood and Affect: Mood normal.         Speech: Speech normal.         Behavior: Behavior normal.         Thought Content: Thought content normal.         Judgment: Judgment normal.            Result Review :     Common labs    Common Labsle 2/14/22 2/14/22 3/30/22 3/30/22 3/30/22 4/13/22    1503 1503 0900 0900 0900    Glucose 105 (A)   98     Glucose      143 (A)   BUN 17   17  21 (A)   Creatinine 0.87   0.80  1.0   eGFR Non African Am 63        Sodium 139   139  136   Potassium 4.9   3.9  4.1   Chloride 103   105  102   Calcium 9.5   9.4  9.1   Albumin 4.20   3.90  3.8   Total Bilirubin 0.3   0.4  0.40   Alkaline Phosphatase 91   100  95   AST (SGOT) 24   23  16   ALT (SGPT) 13   13  22   WBC   5.45      Hemoglobin   10.8 (A)      Hematocrit    34.1      Platelets   337      Total Cholesterol     166    Triglycerides     85    HDL Cholesterol     65 (A)    LDL Cholesterol      85    Uric Acid  4.1       (A) Abnormal value            CBC    CBC 9/21/21 3/30/22   WBC 5.31 5.45   RBC 4.14 3.86   Hemoglobin 12.2 10.8 (A)   Hematocrit 37.2 34.1   MCV 89.9 88.3   MCH 29.5 28.0   MCHC 32.8 31.7   RDW 12.9 13.8   Platelets 253 337   (A) Abnormal value            Data reviewed: Local ER records prior to [place] transfer       Future Appointments   Date Time Provider Department Center   6/13/2022 10:30 AM Andres Heart MD [facility] POW [facility]   7/11/2022 10:30 AM [facility] OP [facility] CHAIR 12 [facility] OPI [facility]        Assessment and Plan    Diagnoses and all orders for this visit:    1. Hospital discharge follow-up (Primary)    2. Acute systolic CHF (congestive heart failure) (HCC)  -     Ambulatory Referral to Cardiology    3. Mitral valve failure  -     Ambulatory Referral to Cardiology    4. Essential (primary) hypertension    5. Unintentional weight loss    6. Iron deficiency anemia secondary to inadequate dietary iron intake    Other orders  -     lisinopril (PRINIVIL,ZESTRIL) 5 MG tablet; Take 1 tablet by mouth 2 (Two) Times a Day.  Dispense: 60 tablet; Refill: 5             I spent 45 minutes caring for Court on this date of service. This time includes time spent by me in the following activities:preparing for the visit, reviewing tests, obtaining and/or reviewing a separately obtained history, performing a medically appropriate examination and/or evaluation , counseling and educating the patient/family/caregiver, ordering medications, tests, or procedures, referring and communicating with other health care professionals , documenting information in the medical record and care coordination with her daughter    We requested a copy of hospital records from [facility] in [place], but have not received at the time of the visit.      Stop and set aside the  lisinopril 20 mg.  A prescription for lisinopril 5 mg is given to take one tablet b.i.d. and have patient take 1/2 tablet of the Coreg 3.125 mg b.i.d. for more consistent management of BP and HR. Continue to monitor BP and HR and keep a diary.  For hypotensive episodes, they can hold the lisinopril.  She has the Lasix on hand to use for recurrence of fluid/chest congestion indicating CHF.  Take a Lasix tablet in a day there is sudden weight gain of 3 pounds or more.  It sounds as if patient may require mitral valve repair.  Refer to Dr. Tushar Sandhu, cardiologist at [facility] in [place], at the request of the family.  Patient's debbie is employed in the cardiology department at the [facility] and may be able to help expedite the appointment.  I discussed the fact that [place] can perform mitral valve replacement/repair percutaneously in some cases.    Her weight is even lower with this visit, but some of that may be due to recent use of diuretics.  We have looked rather extensively for evidence of occult malignancy.    Earlier this month, I referred her to Dr. Pete for GI work-up in light of the iron deficiency anemia and unexplained weight loss, but I see no evidence that an appointment has been scheduled yet.  We will look into that issue.  If the family wants a GI work-up in [place], we can make that referral, if the give us the name for general surgeon or gastroenterologist.    Return in June for routine follow up with fasting labs one week prior or sooner if needed.       Scribed for Dr. Heart by [facility].     Follow Up   Return if symptoms worsen or fail to improve, for Next scheduled follow up, Next scheduled follow up - labs 1 week prior.  Patient was given instructions and counseling regarding her condition or for health maintenance advice. Please see specific information pulled into the AVS if appropriate.

## 2022-04-27 ENCOUNTER — TELEPHONE (OUTPATIENT)
Dept: FAMILY MEDICINE CLINIC | Facility: CLINIC | Age: 77
End: 2022-04-27

## 2022-04-27 DIAGNOSIS — D50.8 IRON DEFICIENCY ANEMIA SECONDARY TO INADEQUATE DIETARY IRON INTAKE: ICD-10-CM

## 2022-04-27 DIAGNOSIS — D64.9 NORMOCYTIC ANEMIA: ICD-10-CM

## 2022-04-27 DIAGNOSIS — R63.4 UNINTENTIONAL WEIGHT LOSS: Primary | ICD-10-CM

## 2022-04-27 NOTE — TELEPHONE ENCOUNTER
Dr. Heart ordered to refer to GI or surgeon for anemia consult and patient's  family request Dr. Ethel Watkins general surgeon  in [place] d/t easier location.     Order placed. TP

## 2022-05-27 ENCOUNTER — TELEPHONE (OUTPATIENT)
Dept: FAMILY MEDICINE CLINIC | Facility: CLINIC | Age: 77
End: 2022-05-27

## 2022-05-27 NOTE — TELEPHONE ENCOUNTER
Irma from Speech Therapy Services at [facility] called and requested orders for CINE esophogram to be faxed. She will need the orders sometime next week.     Requested fax [phone]   [facility]

## 2022-06-10 ENCOUNTER — TELEPHONE (OUTPATIENT)
Dept: FAMILY MEDICINE CLINIC | Facility: CLINIC | Age: 77
End: 2022-06-10

## 2022-06-10 NOTE — TELEPHONE ENCOUNTER
I called and spoke to Irma at [facility] speech therapy regarding Dr. Heart new order to eval her for Dx of silent aspiration and to thicken all liquids to nectar thick via straw.   She was aware and voiced understanding. TP

## 2022-06-13 ENCOUNTER — LAB (OUTPATIENT)
Dept: LAB | Facility: OTHER | Age: 77
End: 2022-06-13

## 2022-06-13 DIAGNOSIS — D50.8 IRON DEFICIENCY ANEMIA SECONDARY TO INADEQUATE DIETARY IRON INTAKE: ICD-10-CM

## 2022-06-13 DIAGNOSIS — D64.9 NORMOCYTIC ANEMIA: ICD-10-CM

## 2022-06-13 LAB
BASOPHILS # BLD AUTO: 0.04 10*3/MM3 (ref 0–0.2)
BASOPHILS NFR BLD AUTO: 0.6 % (ref 0–1.5)
DEPRECATED RDW RBC AUTO: 49.1 FL (ref 37–54)
EOSINOPHIL # BLD AUTO: 0.45 10*3/MM3 (ref 0–0.4)
EOSINOPHIL NFR BLD AUTO: 7.1 % (ref 0.3–6.2)
ERYTHROCYTE [DISTWIDTH] IN BLOOD BY AUTOMATED COUNT: 15.4 % (ref 12.3–15.4)
FERRITIN SERPL-MCNC: 144 NG/ML (ref 13–150)
HCT VFR BLD AUTO: 33.6 % (ref 34–46.6)
HGB BLD-MCNC: 10.5 G/DL (ref 12–15.9)
IRON 24H UR-MRATE: 36 MCG/DL (ref 37–145)
IRON SATN MFR SERPL: 10 % (ref 20–50)
LYMPHOCYTES # BLD AUTO: 1.98 10*3/MM3 (ref 0.7–3.1)
LYMPHOCYTES NFR BLD AUTO: 31.2 % (ref 19.6–45.3)
MCH RBC QN AUTO: 28 PG (ref 26.6–33)
MCHC RBC AUTO-ENTMCNC: 31.3 G/DL (ref 31.5–35.7)
MCV RBC AUTO: 89.6 FL (ref 79–97)
MONOCYTES # BLD AUTO: 0.62 10*3/MM3 (ref 0.1–0.9)
MONOCYTES NFR BLD AUTO: 9.8 % (ref 5–12)
NEUTROPHILS NFR BLD AUTO: 3.26 10*3/MM3 (ref 1.7–7)
NEUTROPHILS NFR BLD AUTO: 51.3 % (ref 42.7–76)
PLATELET # BLD AUTO: 188 10*3/MM3 (ref 140–450)
PMV BLD AUTO: 10.5 FL (ref 6–12)
RBC # BLD AUTO: 3.75 10*6/MM3 (ref 3.77–5.28)
TIBC SERPL-MCNC: 361 MCG/DL (ref 298–536)
TRANSFERRIN SERPL-MCNC: 242 MG/DL (ref 200–360)
WBC NRBC COR # BLD: 6.35 10*3/MM3 (ref 3.4–10.8)

## 2022-06-13 PROCEDURE — 82728 ASSAY OF FERRITIN: CPT | Performed by: INTERNAL MEDICINE

## 2022-06-13 PROCEDURE — 85025 COMPLETE CBC W/AUTO DIFF WBC: CPT | Performed by: INTERNAL MEDICINE

## 2022-06-13 PROCEDURE — 83540 ASSAY OF IRON: CPT | Performed by: INTERNAL MEDICINE

## 2022-06-13 PROCEDURE — 84466 ASSAY OF TRANSFERRIN: CPT | Performed by: INTERNAL MEDICINE

## 2022-06-13 PROCEDURE — 36415 COLL VENOUS BLD VENIPUNCTURE: CPT | Performed by: INTERNAL MEDICINE

## 2022-06-15 ENCOUNTER — APPOINTMENT (OUTPATIENT)
Dept: GENERAL RADIOLOGY | Facility: HOSPITAL | Age: 77
End: 2022-06-15

## 2022-06-15 ENCOUNTER — HOSPITAL ENCOUNTER (EMERGENCY)
Facility: HOSPITAL | Age: 77
Discharge: ANOTHER HOSPITAL | End: 2022-06-15
Attending: EMERGENCY MEDICINE | Admitting: EMERGENCY MEDICINE

## 2022-06-15 VITALS
DIASTOLIC BLOOD PRESSURE: 65 MMHG | HEART RATE: 72 BPM | TEMPERATURE: 98.4 F | WEIGHT: 99.4 LBS | OXYGEN SATURATION: 100 % | SYSTOLIC BLOOD PRESSURE: 118 MMHG | RESPIRATION RATE: 22 BRPM

## 2022-06-15 DIAGNOSIS — J81.0 ACUTE PULMONARY EDEMA: ICD-10-CM

## 2022-06-15 DIAGNOSIS — J96.21 ACUTE ON CHRONIC RESPIRATORY FAILURE WITH HYPOXIA: Primary | ICD-10-CM

## 2022-06-15 LAB
ALBUMIN SERPL-MCNC: 4 G/DL (ref 3.5–5.2)
ALBUMIN/GLOB SERPL: 1.3 G/DL
ALP SERPL-CCNC: 110 U/L (ref 39–117)
ALT SERPL W P-5'-P-CCNC: 19 U/L (ref 1–33)
ANION GAP SERPL CALCULATED.3IONS-SCNC: 17 MMOL/L (ref 5–15)
ARTERIAL PATENCY WRIST A: ABNORMAL
AST SERPL-CCNC: 30 U/L (ref 1–32)
ATMOSPHERIC PRESS: 747 MMHG
BACTERIA UR QL AUTO: ABNORMAL /HPF
BASE EXCESS BLDA CALC-SCNC: -5.8 MMOL/L (ref 0–2)
BASOPHILS # BLD AUTO: 0.07 10*3/MM3 (ref 0–0.2)
BASOPHILS NFR BLD AUTO: 0.6 % (ref 0–1.5)
BDY SITE: ABNORMAL
BILIRUB SERPL-MCNC: 0.4 MG/DL (ref 0–1.2)
BILIRUB UR QL STRIP: NEGATIVE
BUN SERPL-MCNC: 22 MG/DL (ref 8–23)
BUN/CREAT SERPL: 18.2 (ref 7–25)
CALCIUM SPEC-SCNC: 8.9 MG/DL (ref 8.6–10.5)
CHLORIDE SERPL-SCNC: 100 MMOL/L (ref 98–107)
CLARITY UR: CLEAR
CO2 SERPL-SCNC: 20 MMOL/L (ref 22–29)
COLOR UR: YELLOW
CREAT SERPL-MCNC: 1.21 MG/DL (ref 0.57–1)
D-LACTATE SERPL-SCNC: 4.6 MMOL/L (ref 0.5–2)
D-LACTATE SERPL-SCNC: 5.2 MMOL/L (ref 0.5–2)
DEPRECATED RDW RBC AUTO: 50 FL (ref 37–54)
EGFRCR SERPLBLD CKD-EPI 2021: 46.3 ML/MIN/1.73
EOSINOPHIL # BLD AUTO: 0.57 10*3/MM3 (ref 0–0.4)
EOSINOPHIL NFR BLD AUTO: 4.8 % (ref 0.3–6.2)
ERYTHROCYTE [DISTWIDTH] IN BLOOD BY AUTOMATED COUNT: 15.3 % (ref 12.3–15.4)
GLOBULIN UR ELPH-MCNC: 3 GM/DL
GLUCOSE SERPL-MCNC: 325 MG/DL (ref 65–99)
GLUCOSE UR STRIP-MCNC: ABNORMAL MG/DL
HCO3 BLDA-SCNC: 20.4 MMOL/L (ref 20–26)
HCT VFR BLD AUTO: 38 % (ref 34–46.6)
HGB BLD-MCNC: 12.1 G/DL (ref 12–15.9)
HGB UR QL STRIP.AUTO: NEGATIVE
HOLD SPECIMEN: NORMAL
HYALINE CASTS UR QL AUTO: ABNORMAL /LPF
IMM GRANULOCYTES # BLD AUTO: 0.1 10*3/MM3 (ref 0–0.05)
IMM GRANULOCYTES NFR BLD AUTO: 0.8 % (ref 0–0.5)
INHALED O2 CONCENTRATION: 60 %
KETONES UR QL STRIP: NEGATIVE
LEUKOCYTE ESTERASE UR QL STRIP.AUTO: ABNORMAL
LYMPHOCYTES # BLD AUTO: 5.14 10*3/MM3 (ref 0.7–3.1)
LYMPHOCYTES NFR BLD AUTO: 43.6 % (ref 19.6–45.3)
Lab: ABNORMAL
MAGNESIUM SERPL-MCNC: 2.2 MG/DL (ref 1.6–2.4)
MCH RBC QN AUTO: 28.5 PG (ref 26.6–33)
MCHC RBC AUTO-ENTMCNC: 31.8 G/DL (ref 31.5–35.7)
MCV RBC AUTO: 89.6 FL (ref 79–97)
MODALITY: ABNORMAL
MONOCYTES # BLD AUTO: 0.54 10*3/MM3 (ref 0.1–0.9)
MONOCYTES NFR BLD AUTO: 4.6 % (ref 5–12)
NEUTROPHILS NFR BLD AUTO: 45.6 % (ref 42.7–76)
NEUTROPHILS NFR BLD AUTO: 5.36 10*3/MM3 (ref 1.7–7)
NITRITE UR QL STRIP: NEGATIVE
NRBC BLD AUTO-RTO: 0 /100 WBC (ref 0–0.2)
NT-PROBNP SERPL-MCNC: 2560 PG/ML (ref 0–1800)
PCO2 BLDA: 41.7 MM HG (ref 35–45)
PEEP RESPIRATORY: 5 CM[H2O]
PH BLDA: 7.3 PH UNITS (ref 7.35–7.45)
PH UR STRIP.AUTO: 6 [PH] (ref 5–9)
PLATELET # BLD AUTO: 273 10*3/MM3 (ref 140–450)
PMV BLD AUTO: 11.3 FL (ref 6–12)
PO2 BLDA: 134 MM HG (ref 83–108)
POTASSIUM SERPL-SCNC: 4.8 MMOL/L (ref 3.5–5.2)
PROT SERPL-MCNC: 7 G/DL (ref 6–8.5)
PROT UR QL STRIP: NEGATIVE
QT INTERVAL: 440 MS
QTC INTERVAL: 514 MS
RBC # BLD AUTO: 4.24 10*6/MM3 (ref 3.77–5.28)
RBC # UR STRIP: ABNORMAL /HPF
REF LAB TEST METHOD: ABNORMAL
SAO2 % BLDCOA: 98.8 % (ref 94–99)
SET MECH RESP RATE: 20
SODIUM SERPL-SCNC: 137 MMOL/L (ref 136–145)
SP GR UR STRIP: 1.01 (ref 1–1.03)
SQUAMOUS #/AREA URNS HPF: ABNORMAL /HPF
TOTAL RATE: 20 BREATHS/MINUTE
TROPONIN T SERPL-MCNC: <0.01 NG/ML (ref 0–0.03)
UROBILINOGEN UR QL STRIP: ABNORMAL
VENTILATOR MODE: AC
VT ON VENT VENT: 500 ML
WBC # UR STRIP: ABNORMAL /HPF
WBC NRBC COR # BLD: 11.78 10*3/MM3 (ref 3.4–10.8)
WHOLE BLOOD HOLD COAG: NORMAL
YEAST URNS QL MICRO: ABNORMAL /HPF

## 2022-06-15 PROCEDURE — 80053 COMPREHEN METABOLIC PANEL: CPT | Performed by: EMERGENCY MEDICINE

## 2022-06-15 PROCEDURE — 81001 URINALYSIS AUTO W/SCOPE: CPT | Performed by: EMERGENCY MEDICINE

## 2022-06-15 PROCEDURE — 84484 ASSAY OF TROPONIN QUANT: CPT | Performed by: EMERGENCY MEDICINE

## 2022-06-15 PROCEDURE — 25010000002 FUROSEMIDE PER 20 MG: Performed by: EMERGENCY MEDICINE

## 2022-06-15 PROCEDURE — 83880 ASSAY OF NATRIURETIC PEPTIDE: CPT | Performed by: EMERGENCY MEDICINE

## 2022-06-15 PROCEDURE — 51702 INSERT TEMP BLADDER CATH: CPT

## 2022-06-15 PROCEDURE — 85025 COMPLETE CBC W/AUTO DIFF WBC: CPT | Performed by: EMERGENCY MEDICINE

## 2022-06-15 PROCEDURE — 87040 BLOOD CULTURE FOR BACTERIA: CPT | Performed by: EMERGENCY MEDICINE

## 2022-06-15 PROCEDURE — 31500 INSERT EMERGENCY AIRWAY: CPT

## 2022-06-15 PROCEDURE — 96365 THER/PROPH/DIAG IV INF INIT: CPT

## 2022-06-15 PROCEDURE — 25010000002 SUCCINYLCHOLINE PER 20 MG: Performed by: EMERGENCY MEDICINE

## 2022-06-15 PROCEDURE — 94002 VENT MGMT INPAT INIT DAY: CPT

## 2022-06-15 PROCEDURE — 94799 UNLISTED PULMONARY SVC/PX: CPT

## 2022-06-15 PROCEDURE — 99291 CRITICAL CARE FIRST HOUR: CPT

## 2022-06-15 PROCEDURE — 25010000002 PROPOFOL 10 MG/ML EMULSION: Performed by: EMERGENCY MEDICINE

## 2022-06-15 PROCEDURE — 71045 X-RAY EXAM CHEST 1 VIEW: CPT

## 2022-06-15 PROCEDURE — 36600 WITHDRAWAL OF ARTERIAL BLOOD: CPT

## 2022-06-15 PROCEDURE — 83605 ASSAY OF LACTIC ACID: CPT | Performed by: EMERGENCY MEDICINE

## 2022-06-15 PROCEDURE — 96375 TX/PRO/DX INJ NEW DRUG ADDON: CPT

## 2022-06-15 PROCEDURE — 82803 BLOOD GASES ANY COMBINATION: CPT

## 2022-06-15 PROCEDURE — 36415 COLL VENOUS BLD VENIPUNCTURE: CPT | Performed by: EMERGENCY MEDICINE

## 2022-06-15 PROCEDURE — 93005 ELECTROCARDIOGRAM TRACING: CPT | Performed by: EMERGENCY MEDICINE

## 2022-06-15 PROCEDURE — 87086 URINE CULTURE/COLONY COUNT: CPT | Performed by: EMERGENCY MEDICINE

## 2022-06-15 PROCEDURE — 83735 ASSAY OF MAGNESIUM: CPT | Performed by: EMERGENCY MEDICINE

## 2022-06-15 PROCEDURE — 96366 THER/PROPH/DIAG IV INF ADDON: CPT

## 2022-06-15 PROCEDURE — 93010 ELECTROCARDIOGRAM REPORT: CPT | Performed by: INTERNAL MEDICINE

## 2022-06-15 RX ORDER — FUROSEMIDE 10 MG/ML
80 INJECTION INTRAMUSCULAR; INTRAVENOUS ONCE
Status: COMPLETED | OUTPATIENT
Start: 2022-06-15 | End: 2022-06-15

## 2022-06-15 RX ORDER — SUCCINYLCHOLINE CHLORIDE 20 MG/ML
70 INJECTION INTRAMUSCULAR; INTRAVENOUS ONCE
Status: COMPLETED | OUTPATIENT
Start: 2022-06-15 | End: 2022-06-15

## 2022-06-15 RX ORDER — ETOMIDATE 2 MG/ML
15 INJECTION INTRAVENOUS ONCE
Status: COMPLETED | OUTPATIENT
Start: 2022-06-15 | End: 2022-06-15

## 2022-06-15 RX ORDER — SODIUM CHLORIDE 0.9 % (FLUSH) 0.9 %
10 SYRINGE (ML) INJECTION AS NEEDED
Status: DISCONTINUED | OUTPATIENT
Start: 2022-06-15 | End: 2022-06-15 | Stop reason: HOSPADM

## 2022-06-15 RX ADMIN — SUCCINYLCHOLINE CHLORIDE 70 MG: 20 INJECTION, SOLUTION INTRAMUSCULAR; INTRAVENOUS at 00:19

## 2022-06-15 RX ADMIN — PROPOFOL 5 MCG/KG/MIN: 10 INJECTION, EMULSION INTRAVENOUS at 00:52

## 2022-06-15 RX ADMIN — FUROSEMIDE 80 MG: 10 INJECTION, SOLUTION INTRAVENOUS at 02:42

## 2022-06-15 RX ADMIN — ETOMIDATE 15 MG: 20 INJECTION, SOLUTION INTRAVENOUS at 00:19

## 2022-06-15 NOTE — ED PROVIDER NOTES
Subjective   77-year-old female was brought to the emergency department via EMS from her home with concern for acute respiratory failure.  She apparently became significantly short of breath suddenly at home with her .  EMS reports they found her struggling to breathe with saturations in the 70% range.  They bring her in to us because of concern for possible STEMI on EKG.  She apparently has been admitted recently for a heart attack.  Patient arrives with continued mid 70% pulse oximetry on CPAP.  Immediately upon arrival she is struggling to breathe and she consents to intubation per my discussion.    Family history, surgical history, social history, current medications and allergies are reviewed with the patient and triage documentation and vitals are reviewed.      History provided by:  Patient, medical records and EMS personnel  History limited by:  Severe respiratory distress   used: No        Review of Systems   Unable to perform ROS: Severe respiratory distress   Respiratory: Positive for shortness of breath.        No past medical history on file.    Not on File    No past surgical history on file.    No family history on file.    Social History     Socioeconomic History   • Marital status:            Objective   Physical Exam  Vitals and nursing note reviewed.   Constitutional:       General: She is in acute distress.      Appearance: She is well-developed. She is ill-appearing.   HENT:      Head: Normocephalic.      Mouth/Throat:      Mouth: Mucous membranes are moist.      Comments: Pink frothy sputum  Eyes:      Pupils: Pupils are equal, round, and reactive to light.   Cardiovascular:      Rate and Rhythm: Normal rate and regular rhythm.      Pulses: Normal pulses.      Heart sounds: Murmur heard.   Pulmonary:      Effort: Tachypnea, accessory muscle usage and respiratory distress present.      Breath sounds: Examination of the right-upper field reveals decreased breath  sounds and rales. Examination of the left-upper field reveals decreased breath sounds and rales. Examination of the right-lower field reveals decreased breath sounds and rales. Examination of the left-lower field reveals decreased breath sounds and rales. Decreased breath sounds and rales present. No wheezing or rhonchi.   Chest:      Chest wall: No tenderness or crepitus.   Abdominal:      General: Bowel sounds are normal.      Palpations: Abdomen is soft.   Musculoskeletal:      Cervical back: Normal range of motion and neck supple.      Right lower leg: Edema present.      Left lower leg: Edema present.   Skin:     General: Skin is warm and dry.      Capillary Refill: Capillary refill takes less than 2 seconds.      Coloration: Skin is pale.   Neurological:      General: No focal deficit present.      Mental Status: She is alert.   Psychiatric:         Mood and Affect: Mood is anxious.         Intubation    Date/Time: 6/15/2022 3:04 AM  Performed by: Yoav Perry DO  Authorized by: Yoav Perry DO     Consent:     Consent obtained:  Emergent situation    Consent given by:  Patient  Pre-procedure details:     Indication: failure to oxygenate, failure to ventilate and predicted clinical deterioration      Patient status:  Awake    Look externally: no concerns      Mouth opening - incisor distance:  3 or more finger widths    Hyoid-mental distance: 3 or more finger widths      Hyoid-thyroid distance: 2 or more finger widths      Mallampati score:  I    Obstruction: none      Pharmacologic strategy: RSI      Induction agents:  Etomidate    Paralytics:  Succinylcholine  Procedure details:     Preoxygenation: CPAP.    CPR in progress: no      Intubation method:  Oral    Intubation technique: direct and video assisted      Laryngoscope blade:  Mac 3    Bougie used: no      Grade view: I      Tube size (mm):  7.0    Tube type:  Cuffed    Number of attempts:  1    Tube visualized through cords: yes     Placement assessment:     ETT at teeth/gumline (cm):  22    Tube secured with:  ETT uko    Breath sounds:  Equal and absent over the epigastrium    Placement verification: chest rise, CXR verification, direct visualization and ETCO2 detector      CXR findings:  Appropriate position  Post-procedure details:     Procedure completion:  Tolerated well, no immediate complications  Critical Care  Performed by: Yoav Perry DO  Authorized by: Yoav Perry DO     Critical care provider statement:     Critical care time (minutes):  64    Critical care time was exclusive of:  Separately billable procedures and treating other patients    Critical care was necessary to treat or prevent imminent or life-threatening deterioration of the following conditions:  Respiratory failure    Critical care was time spent personally by me on the following activities:  Development of treatment plan with patient or surrogate, ordering and performing treatments and interventions, ordering and review of laboratory studies, ordering and review of radiographic studies, pulse oximetry, re-evaluation of patient's condition, review of old charts, ventilator management, discussions with consultants, evaluation of patient's response to treatment, examination of patient and obtaining history from patient or surrogate    I assumed direction of critical care for this patient from another provider in my specialty: no      Care discussed with: accepting provider at another facility                 ED Course      Labs Reviewed   COMPREHENSIVE METABOLIC PANEL - Abnormal; Notable for the following components:       Result Value    Glucose 325 (*)     Creatinine 1.21 (*)     CO2 20.0 (*)     Anion Gap 17.0 (*)     eGFR 46.3 (*)     All other components within normal limits    Narrative:     GFR Normal >60  Chronic Kidney Disease <60  Kidney Failure <15     BNP (IN-HOUSE) - Abnormal; Notable for the following components:    proBNP 2,560.0 (*)      All other components within normal limits    Narrative:     Among patients with dyspnea, NT-proBNP is highly sensitive for the detection of acute congestive heart failure. In addition NT-proBNP of <300 pg/ml effectively rules out acute congestive heart failure with 99% negative predictive value.    Results may be falsely decreased if patient taking Biotin.     URINALYSIS W/ CULTURE IF INDICATED - Abnormal; Notable for the following components:    Glucose,  mg/dL (Trace) (*)     Leuk Esterase, UA Trace (*)     All other components within normal limits    Narrative:     In absence of clinical symptoms, the presence of pyuria, bacteria, and/or nitrites on the urinalysis result does not correlate with infection.   LACTIC ACID, PLASMA - Abnormal; Notable for the following components:    Lactate 5.2 (*)     All other components within normal limits   CBC WITH AUTO DIFFERENTIAL - Abnormal; Notable for the following components:    WBC 11.78 (*)     Monocyte % 4.6 (*)     Immature Grans % 0.8 (*)     Lymphocytes, Absolute 5.14 (*)     Eosinophils, Absolute 0.57 (*)     Immature Grans, Absolute 0.10 (*)     All other components within normal limits   BLOOD GAS, ARTERIAL - Abnormal; Notable for the following components:    pH, Arterial 7.298 (*)     pO2, Arterial 134.0 (*)     Base Excess, Arterial -5.8 (*)     All other components within normal limits   URINALYSIS, MICROSCOPIC ONLY - Abnormal; Notable for the following components:    RBC, UA 0-2 (*)     WBC, UA 6-12 (*)     All other components within normal limits   TROPONIN (IN-HOUSE) - Normal    Narrative:     Troponin T Reference Range:  <= 0.03 ng/mL-   Negative for AMI  >0.03 ng/mL-     Abnormal for myocardial necrosis.  Clinicians would have to utilize clinical acumen, EKG, Troponin and serial changes to determine if it is an Acute Myocardial Infarction or myocardial injury due to an underlying chronic condition.       Results may be falsely decreased if  patient taking Biotin.     MAGNESIUM - Normal   BLOOD CULTURE   BLOOD CULTURE   URINE CULTURE   BLOOD GAS, ARTERIAL   LACTIC ACID, REFLEX   CBC AND DIFFERENTIAL    Narrative:     The following orders were created for panel order CBC & Differential.  Procedure                               Abnormality         Status                     ---------                               -----------         ------                     CBC Auto Differential[[id number]]        Abnormal            Final result                 Please view results for these tests on the individual orders.   EXTRA TUBES    Narrative:     The following orders were created for panel order Extra Tubes.  Procedure                               Abnormality         Status                     ---------                               -----------         ------                     Gold Top - SST[[id number]]                                   Final result               Light Blue Top[[id number]]                                   Final result                 Please view results for these tests on the individual orders.   GOLD TOP - SST   LIGHT BLUE TOP     XR Chest 1 View    Result Date: 6/15/2022  Narrative: EXAM:   XR Chest, 1 View CLINICAL HISTORY:   The patient is 77 years old and is Female; s/p intubation TECHNIQUE:   Frontal view of the chest. COMPARISON:   Chest radiograph June 9, 2022 FINDINGS:   LUNGS:  The lungs are hyperinflated.  Interval development of extensive pulmonary opacities throughout the lungs superimposed on chronic lung changes.   PLEURAL SPACE:  Unremarkable.  No pneumothorax.   HEART:  Unremarkable.  No cardiomegaly.   MEDIASTINUM:  Unremarkable.   BONES/JOINTS:  There are degenerative changes of the bones.   TUBES, LINES AND DEVICES:  The endotracheal tube (ETT) is in satisfactory position.   UPPER ABDOMEN:  Unremarkable as visualized.     Impression: 1.  The endotracheal tube (ETT) is in satisfactory position. 2.  Findings suggest interval  development of diffuse infectious process. Electronically signed by:  Carmelita Gandhi MD  6/15/2022 12:51 AM CDT Workstation: [id number]    EKG  Lily 15, 2022 at 0028 NSR 82 bpm. LBBB. No acute ischemia.        MDM  Number of Diagnoses or Management Options     Amount and/or Complexity of Data Reviewed  Clinical lab tests: reviewed  Tests in the radiology section of CPT®: reviewed  Obtain history from someone other than the patient: yes  Review and summarize past medical records: yes  Discuss the patient with other providers: yes    Critical Care  Total time providing critical care: 30-74 minutes    Patient Progress  Patient progress: stable    Patient's EKG is a old left bundle branch block with no evidence of acute ischemia.  She is intubated upon arrival due to severe respiratory distress and hypoxia.  Findings consistent with acute pulmonary edema and acute on chronic respiratory failure with hypoxia.  Family arrives and reports that patient usually is cared for at the [facility] in [place] and her cardiologist and pulmonologist are there.  They are unsure why the patient was brought to us.  I spoke with the on-call hospitalist, Dr. Connolly at the [facility] who accepts the patient in transfer.  Patient will be sent by ground EMS for further treatment there.      Final diagnoses:   Acute on chronic respiratory failure with hypoxia (HCC)   Acute pulmonary edema (HCC)         ED Disposition  ED Disposition     ED Disposition   Transfer to Another Facility     Condition   --    Comment   --             No follow-up provider specified.       Medication List      No changes were made to your prescriptions during this visit.          Yoav Perry, DO  06/15/22 0321

## 2022-06-16 LAB — BACTERIA SPEC AEROBE CULT: NO GROWTH

## 2022-06-20 ENCOUNTER — OFFICE VISIT (OUTPATIENT)
Dept: FAMILY MEDICINE CLINIC | Facility: CLINIC | Age: 77
End: 2022-06-20

## 2022-06-20 VITALS
OXYGEN SATURATION: 97 % | WEIGHT: 77.6 LBS | SYSTOLIC BLOOD PRESSURE: 118 MMHG | DIASTOLIC BLOOD PRESSURE: 62 MMHG | HEART RATE: 69 BPM | TEMPERATURE: 96 F | BODY MASS INDEX: 14.28 KG/M2 | HEIGHT: 62 IN

## 2022-06-20 DIAGNOSIS — I50.22 CHRONIC SYSTOLIC (CONGESTIVE) HEART FAILURE: Chronic | ICD-10-CM

## 2022-06-20 DIAGNOSIS — K59.04 CHRONIC IDIOPATHIC CONSTIPATION: Chronic | ICD-10-CM

## 2022-06-20 DIAGNOSIS — T17.908S ASPIRATION INTO AIRWAY, SEQUELA: Chronic | ICD-10-CM

## 2022-06-20 DIAGNOSIS — Z09 HOSPITAL DISCHARGE FOLLOW-UP: Primary | ICD-10-CM

## 2022-06-20 DIAGNOSIS — D50.8 IRON DEFICIENCY ANEMIA SECONDARY TO INADEQUATE DIETARY IRON INTAKE: Chronic | ICD-10-CM

## 2022-06-20 DIAGNOSIS — R13.12 OROPHARYNGEAL DYSPHAGIA: ICD-10-CM

## 2022-06-20 DIAGNOSIS — I50.33 ACUTE ON CHRONIC DIASTOLIC (CONGESTIVE) HEART FAILURE: ICD-10-CM

## 2022-06-20 DIAGNOSIS — E44.0 MODERATE PROTEIN-CALORIE MALNUTRITION: Chronic | ICD-10-CM

## 2022-06-20 DIAGNOSIS — J96.21 ACUTE ON CHRONIC RESPIRATORY FAILURE WITH HYPOXIA: ICD-10-CM

## 2022-06-20 DIAGNOSIS — R63.4 UNINTENTIONAL WEIGHT LOSS: ICD-10-CM

## 2022-06-20 PROBLEM — T17.908A ASPIRATION INTO AIRWAY: Chronic | Status: ACTIVE | Noted: 2022-06-20

## 2022-06-20 LAB — BACTERIA SPEC AEROBE CULT: NORMAL

## 2022-06-20 PROCEDURE — 99215 OFFICE O/P EST HI 40 MIN: CPT | Performed by: INTERNAL MEDICINE

## 2022-06-20 RX ORDER — DOCUSATE CALCIUM 240 MG
240 CAPSULE ORAL DAILY
Qty: 30 CAPSULE | Refills: 6 | Status: SHIPPED | OUTPATIENT
Start: 2022-06-20

## 2022-06-20 RX ORDER — AMIODARONE HYDROCHLORIDE 200 MG/1
100 TABLET ORAL DAILY
COMMUNITY

## 2022-06-20 RX ORDER — ATORVASTATIN CALCIUM 80 MG/1
80 TABLET, FILM COATED ORAL
COMMUNITY
Start: 2022-05-22

## 2022-06-20 RX ORDER — METOPROLOL SUCCINATE 25 MG/1
12.5 TABLET, EXTENDED RELEASE ORAL DAILY
COMMUNITY
Start: 2022-06-04

## 2022-06-20 RX ORDER — POLYETHYLENE GLYCOL 3350 17 G/17G
17 POWDER, FOR SOLUTION ORAL DAILY
Qty: 507 G | Refills: 6 | COMMUNITY

## 2022-06-20 RX ORDER — ASPIRIN 81 MG/1
81 TABLET, COATED ORAL DAILY
COMMUNITY
Start: 2022-05-22

## 2022-06-20 NOTE — PROGRESS NOTES
Chief Complaint  Follow-up (Post hospital f/u 06/15 for acute respiratory failure and was transferred to [facility]. She was discharged 06/17. She is with her daughter Selena)    Subjective        History of Present Illness     Court Johnson presents to the office accompanied by her daughter, Selena.  Patient is here today for hospital follow up.  Patient has developed several complex chronic medical issues and we have been evaluating unintentional weight loss among other issues . The patient was hospitalized at [facility] in [place] 04/13/2022 through 04/16/2022 for an acute CHF exacerbation and was found to have rather severe mitral valve  regurgitation.  Her EF appears to be 30 to 35%.  She was also hospitalized at [facility] in May after being admitted for Takotsubo cardiomyopathy.  Dr. Sandhu is her cardiologist.       Patient was most recently seen in the emergency department at [facility] 06/15/2022 arriving via EMS from her home with concern for acute respiratory failure after becoming significantly short of breath suddenly at home, shortly after she had been lying down to go to bed.  She was describing approximately 3 days of worsened PND and orthopnea, but also some concern about episodes of possible aspiration. EMS  found her struggling to breathe with saturations in the 70% range with concerns for possible acute ischemia versus STEMI on EKG. ProBNP was 2560. Chest x-ray showed volume overload, for which she was given additional 80 mg of Lasix IV. consented to intubation.  EKG revealed no evidence of acute ishemia.   Findings were consistent with acute pulmonary edema and acute on chronic respiratory failure with hypoxia.  Cardiac enzymes were rather unremarkable without evidence of AMI. Family requested patient be transferred to [facility] in [place] where her cardiologist and pulmonologist are located.  Patient was transferred to [facility] in [place] by ground EMS for further treatment there.  She was admitted to [facility] in [place].  She was only requiring minimal oxygen on the ventilator and was quickly extubated and weaned down to room air.  It was felt that pulmonary edema was due to main cause of her acute respiratory failure.  Mild acute SHAKA resolved with creatinine improving from 1.2 to 0.8.  Her ACE inhibitor, ramipril was held during her stay due to the mild SHAKA and also held on discharge.  BP at goal in the office today.  She is taking Lasix 20 mg every morning and they are weighing the patient daily.  Her weight is stable at home this week.  Her cardiologist has not yet attempted starting Entresto.  Patient was extubated and quickly weaned to room air. She was discharged 06/17/2022 on Lasix 20 mg pantoja for the pulmonary edema and volume overload.        Weight continues to trend down, currently at 77 pounds.  The swallowing difficulties appears to be the most likely cause of her recent significant weight loss.  Patient is also struggling with constipation, which caused her to discontinue oral iron.. Dr. Dumont, hematologist, took her off of oral iron due to constipation with plans to start IV iron infusions.  Her labs continue to reveal anemia.  She had been taking stool softeners for the constipation, but has not taken any for the past few days since hospital discharge. She continues some home speech therapy sessions, although, has not been able to pass swallowing test and continues nectar thick liquids.  She continues to have episodic choking episodes, significantly worse between 10:00 p.m. and midnight.  Her doctors at [facility] had discussed and offered PEG tube placement, although, patient has been somewhat reluctant.  I encouraged her to strongly consider this option.  Her labs reveal protein malnutrition.  ENT referral has been recommended and we have made a referral to Dr. Acosta Damon today, asking  "that she be seen ASAP.  Patient also reports a \"blowing sensation\" in her left ear with some possible worsened hearing loss in the past couple of months. No evidence of TM perforation of infection on ear exam today.  Dr. Damon can also address this issue, but reassurance is given.        Objective   Vital Signs:  /62   Pulse 69   Temp 96 °F (35.6 °C) (Tympanic)   Ht 157.5 cm (62\")   Wt 35.2 kg (77 lb 9.6 oz)   SpO2 97%   BMI 14.19 kg/m²   Estimated body mass index is 14.19 kg/m² as calculated from the following:    Height as of this encounter: 157.5 cm (62\").    Weight as of this encounter: 35.2 kg (77 lb 9.6 oz).        Physical Exam  Vitals reviewed.   Constitutional:       General: She is not in acute distress.     Appearance: She is well-developed.      Comments: Very pleasant, frail female accompanied by daughter, Selena Velazquez.    HENT:      Head: Normocephalic and atraumatic.      Comments: No evidence of left TM perforation     Right Ear: Tympanic membrane and external ear normal. There is no impacted cerumen.      Left Ear: Tympanic membrane and external ear normal. There is no impacted cerumen.      Nose:      Right Sinus: No maxillary sinus tenderness or frontal sinus tenderness.      Left Sinus: No maxillary sinus tenderness or frontal sinus tenderness.      Mouth/Throat:      Mouth: No oral lesions.      Pharynx: Uvula midline.      Tonsils: No tonsillar exudate.   Eyes:      Conjunctiva/sclera: Conjunctivae normal.      Pupils: Pupils are equal, round, and reactive to light.   Neck:      Thyroid: No thyroid mass or thyromegaly.      Vascular: No carotid bruit or JVD.      Trachea: Trachea normal. No tracheal deviation.   Cardiovascular:      Rate and Rhythm: Normal rate and regular rhythm.  No extrasystoles are present.     Chest Wall: PMI is not displaced.      Heart sounds: Normal heart sounds. No murmur heard.  Pulmonary:      Effort: Pulmonary effort is normal. No accessory muscle " usage or respiratory distress.      Breath sounds: No decreased breath sounds, wheezing, rhonchi or rales.      Comments: Just a few chronic lung sounds, but no rhonchi or wheezes.  Abdominal:      General: Bowel sounds are normal. There is no distension.      Palpations: Abdomen is soft.      Tenderness: There is no abdominal tenderness.   Musculoskeletal:      Cervical back: Neck supple.   Lymphadenopathy:      Cervical: No cervical adenopathy.   Skin:     General: Skin is warm and dry.      Findings: No rash.      Nails: There is no clubbing.   Neurological:      General: No focal deficit present.      Mental Status: She is alert and oriented to person, place, and time. Mental status is at baseline.      Cranial Nerves: No cranial nerve deficit.      Coordination: Coordination normal.   Psychiatric:         Speech: Speech normal.         Behavior: Behavior normal.         Thought Content: Thought content normal.         Judgment: Judgment normal.            Result Review :    CMP    CMP 4/13/22 5/17/22 6/15/22   Glucose   325 (A)   Glucose 143 (A) 236 (A)    BUN 21 (A) 21 (A) 22   Creatinine 1.0 1.1 (A) 1.21 (A)   Sodium 136 139 137   Potassium 4.1 4.3 4.8   Chloride 102 101 100   Calcium 9.1 9.3 8.9   Albumin 3.8 3.4 4.00   Total Bilirubin 0.40 0.40 0.4   Alkaline Phosphatase 95 110 110   AST (SGOT) 16 34 30   ALT (SGPT) 22 29 19   (A) Abnormal value       Comments are available for some flowsheets but are not being displayed.           CBC w/diff    CBC w/Diff 3/30/22 6/13/22 6/15/22   WBC 5.45 6.35 11.78 (A)   RBC 3.86 3.75 (A) 4.24   Hemoglobin 10.8 (A) 10.5 (A) 12.1   Hematocrit 34.1 33.6 (A) 38.0   MCV 88.3 89.6 89.6   MCH 28.0 28.0 28.5   MCHC 31.7 31.3 (A) 31.8   RDW 13.8 15.4 15.3   Platelets 337 188 273   Neutrophil Rel % 50.5 51.3 45.6   Immature Granulocyte Rel %   0.8 (A)   Lymphocyte Rel % 33.4 31.2 43.6   Monocyte Rel % 10.6 9.8 4.6 (A)   Eosinophil Rel % 4.4 7.1 (A) 4.8   Basophil Rel % 1.1 0.6  0.6   (A) Abnormal value            Lipid Panel    Lipid Panel 3/30/22   Total Cholesterol 166   Triglycerides 85   HDL Cholesterol 65 (A)   VLDL Cholesterol 16   LDL Cholesterol  85   LDL/HDL Ratio 1.29   (A) Abnormal value            TSH    TSH 9/21/21   TSH 0.747               Data reviewed: Recent hospitalization notes  [facility] 06/15/2022/ [facility] 06/15/2022-06/17/2022          Assessment and Plan   Diagnoses and all orders for this visit:    1. Hospital discharge follow-up (Primary)    2. Chronic systolic (congestive) heart failure (HCC) -  EF 30 to 35%  -     Comprehensive Metabolic Panel; Future  -     BNP; Future  -     Magnesium; Future    3. Acute on chronic diastolic (congestive) heart failure (HCC)  -     Magnesium; Future    4. Acute on chronic respiratory failure with hypoxia (HCC)    5. Unintentional weight loss  -     CBC Auto Differential; Future  -     Comprehensive Metabolic Panel; Future    6. Iron deficiency anemia secondary to inadequate dietary iron intake  -     CBC Auto Differential; Future  -     Ferritin; Future    7. Oropharyngeal dysphagia  -     Ambulatory Referral to ENT (Otolaryngology)    8. Aspiration into airway, sequela    9. Moderate protein-calorie malnutrition (HCC)  -     Comprehensive Metabolic Panel; Future    10. Chronic idiopathic constipation    Other orders  -     docusate calcium (SURFAK) 240 MG capsule; Take 1 capsule by mouth Daily.  Dispense: 30 capsule; Refill: 6           I spent 44 minutes caring for Court on this date of service. This time includes time spent by me in the following activities:preparing for the visit, reviewing tests, obtaining and/or reviewing a separately obtained history, performing a medically appropriate examination and/or evaluation , counseling and educating the patient/family/caregiver, ordering medications, tests, or procedures, referring and communicating with other health care professionals  and  documenting information in the medical record     Current outpatient and discharge medications have been reconciled for the patient.  They will discuss referral to a [place] physician for PEG tub placement with Dr. Sandhu with her appointment tomorrow.  Notify us if they need a local referral to get this scheduled ASAP.       Recommended daily use of Miralax 1/2 capful with 8 ounces of liquids progressing to 1 capful daily to help manage constipation. I sent a prescription for Surfak 240 mg to take one q.d.  I also agree with the plan to start IV iron infusions.  Oral iron was held due to the constipation.      Keep follow up appointments with Dr. Sandhu, cardiology and Dr. Dumont, hematologist.  She is scheduled with Dr. Sandhu tomorrow.  Continue the Lasix 20 mg every morning as prescribed by the hospitalist and recommended by her cardiologist at time of hospital discharge.  Her CHF seems to be stable today.  I would anticipate the patient being started on Entresto or at least restarted on ramipril if her blood pressure will tolerate.    Urgent referral made to Dr. Acosta Damon, ENT.   We discussed ways to help with the choking episodes worse between 10:00 and midnight. They can elevate the head of the bed with minimal wedge 3 hours prior to bedtime    We will plan to see patient back in 2 months for follow up on above mentioned issues with fasting labs one week prior.  She has several specialists she is following with and scheduled to see in the next few weeks.        Scribed for Dr. Heart by Olesya Bruno [facility].     Follow Up   Return in about 2 months (around 8/20/2022).  Patient was given instructions and counseling regarding her condition or for health maintenance advice. Please see specific information pulled into the AVS if appropriate.

## 2022-06-22 ENCOUNTER — OFFICE VISIT (OUTPATIENT)
Dept: OTOLARYNGOLOGY | Facility: CLINIC | Age: 77
End: 2022-06-22

## 2022-06-22 VITALS — BODY MASS INDEX: 15.72 KG/M2 | TEMPERATURE: 98.6 F | HEIGHT: 59 IN | WEIGHT: 78 LBS | OXYGEN SATURATION: 100 %

## 2022-06-22 DIAGNOSIS — Z98.890 REQUIRED EMERGENT INTUBATION: ICD-10-CM

## 2022-06-22 DIAGNOSIS — R13.12 OROPHARYNGEAL DYSPHAGIA: Primary | ICD-10-CM

## 2022-06-22 PROCEDURE — 99203 OFFICE O/P NEW LOW 30 MIN: CPT | Performed by: OTOLARYNGOLOGY

## 2022-06-22 PROCEDURE — 31575 DIAGNOSTIC LARYNGOSCOPY: CPT | Performed by: OTOLARYNGOLOGY

## 2022-06-22 NOTE — PROGRESS NOTES
Subjective   Court Johnson is a 77 y.o. female.       History of Present Illness   Patient has recently had to be intubated on 2 occasions.  Has congestive heart failure.  On the first occasion she was intubated for 3-1/2 days, and the intubation was reportedly emergent although not specifically traumatic or difficult.  More recently she was only intubated for about 14 hours.  She reportedly failed swallowing evaluations after extubation and was thought to possibly have a paralyzed vocal cord at 1 point.  Is already working with speech therapy who has her on a honey thickened diet.  Reportedly had aspiration on the swallowing studies.  Was initially hoarse but her voice is reportedly improved.      The following portions of the patient's history were reviewed and updated as appropriate: allergies, current medications, past family history, past medical history, past social history, past surgical history and problem list.     reports that she has never smoked. She has never used smokeless tobacco. She reports that she does not drink alcohol and does not use drugs.   Patient is not a tobacco user and has not been counseled for use of tobacco products      Review of Systems        Objective   Physical Exam  General: Female in no acute distress.  Voice has no breathiness or stridor  Ears: No discharge.  Tympanic membranes intact  Nares: No discharge or purulence  Oral cavity: No masses or lesions  Pharynx: No erythema exudate or mass  Neck: No lymphadenopathy.  No thyromegaly.  Trachea and larynx midline.  No masses in the parotid or submandibular glands.,  Very thin body habitus  Procedure Note    Pre-operative Diagnosis:   Chief Complaint   Patient presents with   • Difficulty Swallowing       Post-operative Diagnosis: Normal vocal cord mobility, no evidence of neoplasm    Anesthesia: topical with xylocaine and neosynephrine    Endoscopy Type:  Flexible Laryngoscopy    Procedure Details:    The patient was placed  in the sitting position.  After topical anesthesia and decongestion, the 4 mm laryngoscope was passed.  The nasal cavities, nasopharynx, oropharynx, hypopharynx, and larynx were all examined.  Vocal cords were examined during respiration and phonation.  The following findings were noted:    Findings: No masses in the nose or nasopharynx.  Tongue base and hypopharynx show no evidence of neoplasm.  Vocal cord mobility is intact and symmetrical bilaterally.  Supraglottic sensation is intact although the cough/gag reflex is not particularly vigorous it is present.    Condition:  Stable.  Patient tolerated procedure well.    Complications:  None         Assessment and Plan   Diagnoses and all orders for this visit:    1. Oropharyngeal dysphagia (Primary)             Plan: Explained to the patient and her family that there is no structural or anatomic problem that can be identified as the source of her dysphagia.  Reassurance that her vocal cords are working normally.  Suspect this is just muscular weakness and mild incoordination.  At this point she should just continue to work with speech therapy and follow their recommendations regarding diet.  I will see her again as needed or at Dr. Heart's discretion

## 2022-06-23 ENCOUNTER — TELEPHONE (OUTPATIENT)
Dept: FAMILY MEDICINE CLINIC | Facility: CLINIC | Age: 77
End: 2022-06-23

## 2022-06-23 NOTE — TELEPHONE ENCOUNTER
[facility] IS NEEDING AN OUT PATIENT ORDER TO DO SPEECH    [facility] IS THE ONE THAT CALLED

## 2022-06-24 ENCOUNTER — TELEPHONE (OUTPATIENT)
Dept: FAMILY MEDICINE CLINIC | Facility: CLINIC | Age: 77
End: 2022-06-24

## 2022-06-24 DIAGNOSIS — J38.00 PARALYZED VOCAL CORDS: Primary | ICD-10-CM

## 2022-06-24 DIAGNOSIS — R13.10 DYSPHAGIA, UNSPECIFIED TYPE: ICD-10-CM

## 2022-06-24 NOTE — TELEPHONE ENCOUNTER
[facility]  FOR [facility]ECH IN HOUSE

## 2022-07-11 ENCOUNTER — APPOINTMENT (OUTPATIENT)
Dept: ONCOLOGY | Facility: HOSPITAL | Age: 77
End: 2022-07-11

## 2022-07-15 ENCOUNTER — TELEPHONE (OUTPATIENT)
Dept: ONCOLOGY | Facility: HOSPITAL | Age: 77
End: 2022-07-15

## 2022-07-15 NOTE — TELEPHONE ENCOUNTER
Please cancel her Reclast appt. Her health status has changed and she is too fragile for this. Thank you, Evin KISER   ----- Message -----   From: Andres Heart MD   Sent: 7/12/2022   6:32 PM CDT   To: Eleazar Rocha RN      Cancel IV Reclast due to her current very frail/poor condition EB   ----- Message -----   From: Eleazar Rocha RN   Sent: 7/12/2022   7:37 AM CDT   To: Andres Heart MD

## 2022-07-21 ENCOUNTER — APPOINTMENT (OUTPATIENT)
Dept: ONCOLOGY | Facility: HOSPITAL | Age: 77
End: 2022-07-21

## 2022-08-31 ENCOUNTER — LAB (OUTPATIENT)
Dept: LAB | Facility: OTHER | Age: 77
End: 2022-08-31

## 2022-08-31 DIAGNOSIS — D50.8 IRON DEFICIENCY ANEMIA SECONDARY TO INADEQUATE DIETARY IRON INTAKE: Chronic | ICD-10-CM

## 2022-08-31 DIAGNOSIS — E44.0 MODERATE PROTEIN-CALORIE MALNUTRITION: Chronic | ICD-10-CM

## 2022-08-31 DIAGNOSIS — I50.33 ACUTE ON CHRONIC DIASTOLIC (CONGESTIVE) HEART FAILURE: ICD-10-CM

## 2022-08-31 DIAGNOSIS — R63.4 UNINTENTIONAL WEIGHT LOSS: ICD-10-CM

## 2022-08-31 DIAGNOSIS — I50.22 CHRONIC SYSTOLIC (CONGESTIVE) HEART FAILURE: Chronic | ICD-10-CM

## 2022-08-31 LAB
ALBUMIN SERPL-MCNC: 4 G/DL (ref 3.5–5)
ALBUMIN/GLOB SERPL: 1.4 G/DL (ref 1.1–1.8)
ALP SERPL-CCNC: 101 U/L (ref 38–126)
ALT SERPL W P-5'-P-CCNC: 25 U/L
ANION GAP SERPL CALCULATED.3IONS-SCNC: 4 MMOL/L (ref 5–15)
AST SERPL-CCNC: 26 U/L (ref 14–36)
BASOPHILS # BLD AUTO: 0.05 10*3/MM3 (ref 0–0.2)
BASOPHILS NFR BLD AUTO: 0.7 % (ref 0–1.5)
BILIRUB SERPL-MCNC: 0.3 MG/DL (ref 0.2–1.3)
BNP SERPL-MCNC: 168 PG/ML (ref 0–100)
BUN SERPL-MCNC: 21 MG/DL (ref 7–23)
BUN/CREAT SERPL: 22.1 (ref 7–25)
CALCIUM SPEC-SCNC: 9.1 MG/DL (ref 8.4–10.2)
CHLORIDE SERPL-SCNC: 106 MMOL/L (ref 101–112)
CO2 SERPL-SCNC: 31 MMOL/L (ref 22–30)
CREAT SERPL-MCNC: 0.95 MG/DL (ref 0.52–1.04)
DEPRECATED RDW RBC AUTO: 45.1 FL (ref 37–54)
EGFRCR SERPLBLD CKD-EPI 2021: 61.8 ML/MIN/1.73
EOSINOPHIL # BLD AUTO: 0.21 10*3/MM3 (ref 0–0.4)
EOSINOPHIL NFR BLD AUTO: 2.9 % (ref 0.3–6.2)
ERYTHROCYTE [DISTWIDTH] IN BLOOD BY AUTOMATED COUNT: 13.5 % (ref 12.3–15.4)
GLOBULIN UR ELPH-MCNC: 2.9 GM/DL (ref 2.3–3.5)
GLUCOSE SERPL-MCNC: 96 MG/DL (ref 70–99)
HCT VFR BLD AUTO: 34.1 % (ref 34–46.6)
HGB BLD-MCNC: 10.6 G/DL (ref 12–15.9)
LYMPHOCYTES # BLD AUTO: 2.91 10*3/MM3 (ref 0.7–3.1)
LYMPHOCYTES NFR BLD AUTO: 40.7 % (ref 19.6–45.3)
MAGNESIUM SERPL-MCNC: 2.2 MG/DL (ref 1.6–2.3)
MCH RBC QN AUTO: 29.9 PG (ref 26.6–33)
MCHC RBC AUTO-ENTMCNC: 31.1 G/DL (ref 31.5–35.7)
MCV RBC AUTO: 96.1 FL (ref 79–97)
MONOCYTES # BLD AUTO: 0.64 10*3/MM3 (ref 0.1–0.9)
MONOCYTES NFR BLD AUTO: 9 % (ref 5–12)
NEUTROPHILS NFR BLD AUTO: 3.34 10*3/MM3 (ref 1.7–7)
NEUTROPHILS NFR BLD AUTO: 46.7 % (ref 42.7–76)
PLATELET # BLD AUTO: 267 10*3/MM3 (ref 140–450)
PMV BLD AUTO: 10 FL (ref 6–12)
POTASSIUM SERPL-SCNC: 4.7 MMOL/L (ref 3.4–5)
PROT SERPL-MCNC: 6.9 G/DL (ref 6.3–8.6)
RBC # BLD AUTO: 3.55 10*6/MM3 (ref 3.77–5.28)
SODIUM SERPL-SCNC: 141 MMOL/L (ref 137–145)
WBC NRBC COR # BLD: 7.15 10*3/MM3 (ref 3.4–10.8)

## 2022-08-31 PROCEDURE — 83735 ASSAY OF MAGNESIUM: CPT | Performed by: INTERNAL MEDICINE

## 2022-08-31 PROCEDURE — 83880 ASSAY OF NATRIURETIC PEPTIDE: CPT | Performed by: INTERNAL MEDICINE

## 2022-08-31 PROCEDURE — 82728 ASSAY OF FERRITIN: CPT | Performed by: INTERNAL MEDICINE

## 2022-08-31 PROCEDURE — 80053 COMPREHEN METABOLIC PANEL: CPT | Performed by: INTERNAL MEDICINE

## 2022-08-31 PROCEDURE — 85025 COMPLETE CBC W/AUTO DIFF WBC: CPT | Performed by: INTERNAL MEDICINE

## 2022-08-31 PROCEDURE — 36415 COLL VENOUS BLD VENIPUNCTURE: CPT | Performed by: INTERNAL MEDICINE

## 2022-09-01 LAB — FERRITIN SERPL-MCNC: 33.1 NG/ML (ref 13–150)

## 2022-09-07 ENCOUNTER — OFFICE VISIT (OUTPATIENT)
Dept: FAMILY MEDICINE CLINIC | Facility: CLINIC | Age: 77
End: 2022-09-07

## 2022-09-07 ENCOUNTER — LAB (OUTPATIENT)
Dept: LAB | Facility: OTHER | Age: 77
End: 2022-09-07

## 2022-09-07 VITALS
TEMPERATURE: 96.2 F | SYSTOLIC BLOOD PRESSURE: 126 MMHG | HEIGHT: 59 IN | DIASTOLIC BLOOD PRESSURE: 70 MMHG | BODY MASS INDEX: 17.01 KG/M2 | OXYGEN SATURATION: 99 % | WEIGHT: 84.4 LBS | HEART RATE: 81 BPM

## 2022-09-07 DIAGNOSIS — D51.9 ANEMIA DUE TO VITAMIN B12 DEFICIENCY, UNSPECIFIED B12 DEFICIENCY TYPE: Primary | Chronic | ICD-10-CM

## 2022-09-07 DIAGNOSIS — I50.22 CHRONIC SYSTOLIC (CONGESTIVE) HEART FAILURE: Chronic | ICD-10-CM

## 2022-09-07 DIAGNOSIS — I36.1 TRICUSPID VALVE INCOMPETENCE, NON-RHEUMATIC: Chronic | ICD-10-CM

## 2022-09-07 DIAGNOSIS — M81.6 LOCALIZED OSTEOPOROSIS WITHOUT CURRENT PATHOLOGICAL FRACTURE: Chronic | ICD-10-CM

## 2022-09-07 DIAGNOSIS — D64.9 NORMOCYTIC ANEMIA: Chronic | ICD-10-CM

## 2022-09-07 DIAGNOSIS — R63.6 UNDERWEIGHT DUE TO INADEQUATE CALORIC INTAKE: ICD-10-CM

## 2022-09-07 DIAGNOSIS — D51.9 ANEMIA DUE TO VITAMIN B12 DEFICIENCY, UNSPECIFIED B12 DEFICIENCY TYPE: Chronic | ICD-10-CM

## 2022-09-07 DIAGNOSIS — I34.0 NONRHEUMATIC MITRAL VALVE REGURGITATION: Chronic | ICD-10-CM

## 2022-09-07 DIAGNOSIS — R13.10 DYSPHAGIA, UNSPECIFIED TYPE: Chronic | ICD-10-CM

## 2022-09-07 PROCEDURE — 84466 ASSAY OF TRANSFERRIN: CPT | Performed by: INTERNAL MEDICINE

## 2022-09-07 PROCEDURE — 36415 COLL VENOUS BLD VENIPUNCTURE: CPT | Performed by: INTERNAL MEDICINE

## 2022-09-07 PROCEDURE — 82746 ASSAY OF FOLIC ACID SERUM: CPT | Performed by: INTERNAL MEDICINE

## 2022-09-07 PROCEDURE — 96372 THER/PROPH/DIAG INJ SC/IM: CPT | Performed by: INTERNAL MEDICINE

## 2022-09-07 PROCEDURE — 99215 OFFICE O/P EST HI 40 MIN: CPT | Performed by: INTERNAL MEDICINE

## 2022-09-07 PROCEDURE — 83540 ASSAY OF IRON: CPT | Performed by: INTERNAL MEDICINE

## 2022-09-07 PROCEDURE — 82607 VITAMIN B-12: CPT | Performed by: INTERNAL MEDICINE

## 2022-09-07 RX ORDER — CYANOCOBALAMIN 1000 UG/ML
1000 INJECTION, SOLUTION INTRAMUSCULAR; SUBCUTANEOUS
Status: SHIPPED | OUTPATIENT
Start: 2022-09-07

## 2022-09-07 RX ADMIN — CYANOCOBALAMIN 1000 MCG: 1000 INJECTION, SOLUTION INTRAMUSCULAR; SUBCUTANEOUS at 10:54

## 2022-09-07 NOTE — PROGRESS NOTES
Chief Complaint  Follow-up (Labs. She had been getting therapy with Kellen Farmer at [facility] but that dept has had to cancel appt and she wants referral to [facility]. Our office had placed a hold on her Reclast procedure. See note from 07/15. )    Subjective        History of Present Illness     Court Johnson presents to the office for 2-month follow up.  Patient has developed several complex chronic medical issues. The patient was hospitalized at [facility] in [place] 04/13/2022 through 04/16/2022 for an acute CHF exacerbation and was found to have rather severe mitral valve regurgitation.  She has history of cardiac arrest requiring defibrillation.  She underwent cardiac cath indicating no obstructive CAD.  Patient was scheduled with cardiothoracic surgeon in [place] yesterday, although, visit had to be rescheduled for next week due to provider cancellation.  The visit is to address if candidate meets criteria for mitral clip procedure. Patient's  prefers to schedule the procedure in [place] if patient is found to be a good candidate.  2-3 weeks ago, she experienced 1 day of increased shortness of breath and increased edema which responded well to an extra dose of Lasix    Patient had to be intubated on 2 occasions earlier this summer and has been participating in speech therapy.  However, several recent sessions of speech therapy at [facility] were cancelled.  The therapist was planning to complete a swallowing test. Therapist should be back in next week and they are hoping she can get them in soon to schedule the swallowing test.   We recently made an urgent referral to ENT, Dr. Damon, ENT.  He felt there was no structural or anatomic problem that could be identified as the source of her dysphagia and recommended continuing speech therapy.        Her most recent Reclast was put on hold due to patient being so frail.      We have been monitoring persistent gradual weight loss  I am  "pleased to see her weight is up 6 pounds in the past couple of months with adding nutritional supplements, although, BMI is only 17.     The patient's relevant past medical, surgical, and social history was reviewed in Epic.   Lab results are reviewed with the patient today. BNP improved CBC reveals anemia with hemoglobin 10.6, down from 12.1 three months ago.  B-12 was 715 with labs in March.  We will repeat a B-12 level today.  Denies evidence of GI blood loss or hematuria.  Denies GERD symptoms.       Objective   Vital Signs:  /70   Pulse 81   Temp 96.2 °F (35.7 °C) (Tympanic)   Ht 149.9 cm (59\")   Wt 38.3 kg (84 lb 6.4 oz)   SpO2 99%   BMI 17.05 kg/m²   Estimated body mass index is 17.05 kg/m² as calculated from the following:    Height as of this encounter: 149.9 cm (59\").    Weight as of this encounter: 38.3 kg (84 lb 6.4 oz).          Physical Exam  Vitals reviewed.   Constitutional:       General: She is not in acute distress.     Appearance: She is well-developed.      Comments: Pleasant female.  Accompanied by her .    HENT:      Head: Normocephalic and atraumatic.      Nose:      Right Sinus: No maxillary sinus tenderness or frontal sinus tenderness.      Left Sinus: No maxillary sinus tenderness or frontal sinus tenderness.      Mouth/Throat:      Mouth: No oral lesions.      Pharynx: Uvula midline.      Tonsils: No tonsillar exudate.   Eyes:      Conjunctiva/sclera: Conjunctivae normal.      Pupils: Pupils are equal, round, and reactive to light.   Neck:      Thyroid: No thyroid mass or thyromegaly.      Vascular: No carotid bruit or JVD.      Trachea: Trachea normal. No tracheal deviation.   Cardiovascular:      Rate and Rhythm: Normal rate and regular rhythm.  No extrasystoles are present.     Chest Wall: PMI is not displaced.      Heart sounds: Normal heart sounds. No murmur heard.     Comments: 4/6 systolic murmur heard across the precordium      Pulmonary:      Effort: Pulmonary " effort is normal. No accessory muscle usage or respiratory distress.      Breath sounds: Normal breath sounds. No decreased breath sounds, wheezing, rhonchi or rales.   Abdominal:      General: Bowel sounds are normal. There is no distension.      Palpations: Abdomen is soft.      Tenderness: There is no abdominal tenderness.   Musculoskeletal:      Cervical back: Neck supple.   Lymphadenopathy:      Cervical: No cervical adenopathy.   Skin:     General: Skin is warm and dry.      Findings: No rash.      Nails: There is no clubbing.   Neurological:      Mental Status: She is alert and oriented to person, place, and time.      Cranial Nerves: No cranial nerve deficit.      Coordination: Coordination normal.   Psychiatric:         Speech: Speech normal.         Behavior: Behavior normal.         Thought Content: Thought content normal.         Judgment: Judgment normal.            Result Review :    CMP    CMP 5/17/22 6/15/22 8/31/22   Glucose  325 (A) 96   Glucose 236 (A)     BUN 21 (A) 22 21   Creatinine 1.1 (A) 1.21 (A) 0.95   Sodium 139 137 141   Potassium 4.3 4.8 4.7   Chloride 101 100 106   Calcium 9.3 8.9 9.1   Albumin 3.4 4.00 4.00   Total Bilirubin 0.40 0.4 0.3   Alkaline Phosphatase 110 110 101   AST (SGOT) 34 30 26   ALT (SGPT) 29 19 25   (A) Abnormal value       Comments are available for some flowsheets but are not being displayed.           CBC w/diff    CBC w/Diff 6/13/22 6/15/22 8/31/22   WBC 6.35 11.78 (A) 7.15   RBC 3.75 (A) 4.24 3.55 (A)   Hemoglobin 10.5 (A) 12.1 10.6 (A)   Hematocrit 33.6 (A) 38.0 34.1   MCV 89.6 89.6 96.1   MCH 28.0 28.5 29.9   MCHC 31.3 (A) 31.8 31.1 (A)   RDW 15.4 15.3 13.5   Platelets 188 273 267   Neutrophil Rel % 51.3 45.6 46.7   Immature Granulocyte Rel %  0.8 (A)    Lymphocyte Rel % 31.2 43.6 40.7   Monocyte Rel % 9.8 4.6 (A) 9.0   Eosinophil Rel % 7.1 (A) 4.8 2.9   Basophil Rel % 0.6 0.6 0.7   (A) Abnormal value            TSH    TSH 9/21/21   TSH 0.747               Data  reviewed: Consultant notes Speech therapy notes          Assessment and Plan   Diagnoses and all orders for this visit:    1. Anemia due to vitamin B12 deficiency, unspecified B12 deficiency type (Primary)  -     Folate; Future  -     Vitamin B12; Future  -     cyanocobalamin injection 1,000 mcg  -     CBC Auto Differential; Future  -     Ferritin; Future  -     Iron Profile; Future  -     Vitamin B12; Future    2. Normocytic anemia  -     Folate; Future  -     Iron Profile; Future  -     Vitamin B12; Future  -     CBC Auto Differential; Future  -     Ferritin; Future  -     Iron Profile; Future  -     Vitamin B12; Future    3. Chronic systolic (congestive) heart failure (HCC) -  EF 30 to 35%  -     BNP; Future  -     CBC Auto Differential; Future  -     Comprehensive Metabolic Panel; Future  -     Magnesium; Future    4. Localized osteoporosis without current pathological fracture    5. Dysphagia, unspecified type -improved with ST    6. Underweight due to inadequate caloric intake    7. Nonrheumatic mitral valve regurgitation    8. Tricuspid valve incompetence, non-rheumatic             I spent 44 minutes caring for Court on this date of service. This time includes time spent by me in the following activities:preparing for the visit, reviewing tests, obtaining and/or reviewing a separately obtained history, performing a medically appropriate examination and/or evaluation , counseling and educating the patient/family/caregiver, ordering medications, tests, or procedures, referring and communicating with other health care professionals  and documenting information in the medical record     Labs reveal new anemia.  Orders placed for patient to go to the lab today for B-12 level, iron and folate.  After the lab is drawn, she returns to the office for B-12 injection.  She tolerated well.    She is planning to see if her speech therapist is back at work next week after medical leave.  She needs a repeat swallowing  study to see if her diet can be liberalized.  I suspect she will do quite well.    Keep the appointment with cardiothoracic surgeon next week to evaluate if she is a good candidate for mitral clip.  Patient and  prefer to schedule the procedure in [place] if she is a good candidate.  There is also the issue of tricuspid valve incompetence.  I believe the [place] cardiothoracic specialists opinion on how to proceed would be most appropriate.    For CHF seems to be compensated and stable today.  Addressing her valve abnormalities is now a high priority.    I am pleased to see the weight gain.  Continue the nutritional supplements in between meals.        We will see patient back in one month with nonfasting labs 2-3 days prior to reassess the anemia and her other pertinent issues. She also has several specialists she is following with and will keep those appointments.    I informed them that the [place] physicians are not sending the copies of their notes.  They will once again request that I be sent appropriate information.    In her current condition, I suspect IV Reclast is not a great idea.  After she improves somewhat, Prolia may be a better choice.  She was on Prolia previously, but they became too expensive.  She was intolerant of oral bisphosphonates.  If Prolia is still too expensive, we can resume IV Reclast after she has recovered more.       Scribed for Dr. Heart by Olesya Bruno [facility].     Follow Up   Return in about 1 month (around 10/7/2022).  Patient was given instructions and counseling regarding her condition or for health maintenance advice. Please see specific information pulled into the AVS if appropriate.

## 2022-09-08 LAB
FOLATE SERPL-MCNC: 11.9 NG/ML (ref 4.78–24.2)
IRON 24H UR-MRATE: 48 MCG/DL (ref 37–145)
IRON SATN MFR SERPL: 10 % (ref 20–50)
TIBC SERPL-MCNC: 492 MCG/DL (ref 298–536)
TRANSFERRIN SERPL-MCNC: 330 MG/DL (ref 200–360)
VIT B12 BLD-MCNC: 309 PG/ML (ref 211–946)

## 2022-09-09 RX ORDER — IRON ASPGLY,PS/C/B12/FA/CA/SUC 150-25-1
CAPSULE ORAL 3 TIMES WEEKLY
COMMUNITY
End: 2022-10-07 | Stop reason: ALTCHOICE

## 2022-10-07 ENCOUNTER — TELEPHONE (OUTPATIENT)
Dept: FAMILY MEDICINE CLINIC | Facility: CLINIC | Age: 77
End: 2022-10-07

## 2022-10-07 NOTE — TELEPHONE ENCOUNTER
Patient daughter Selena called to give an update on the specialist that her mom recently seen. She states she was taken off her iron medication and started on Folic acid. The physician was also concerned about her kidney function. Patient has an appt 10/14 with Dr. Heart and some labs may be repeated. TP

## 2022-10-18 ENCOUNTER — TELEPHONE (OUTPATIENT)
Dept: FAMILY MEDICINE CLINIC | Facility: CLINIC | Age: 77
End: 2022-10-18

## 2022-10-18 NOTE — TELEPHONE ENCOUNTER
I c with hematology yet. I appears Dr. Payne office has tried to reach her and left a message. I informed the daughter and instructed if she didn't receive a message to inform me and we will call them again. TPalled patient daughter d/t  Patient doesn't have an appt  This encounter was created in error - please disregard.

## 2022-10-18 NOTE — TELEPHONE ENCOUNTER
----- Message from Radha Smith MA sent at 10/18/2022  9:34 AM CDT -----  [facility] m[facility] FU  1 month FU with nonfasting labs 2-3 days prior to reassess the anemia and her other pertinent issues. Appt was cancelled 10/14/22 and has not been rescheduled. I extend the orders to clear the message from overdue results.

## 2022-11-11 ENCOUNTER — TRANSCRIBE ORDERS (OUTPATIENT)
Dept: LAB | Facility: OTHER | Age: 77
End: 2022-11-11

## 2022-11-11 DIAGNOSIS — I50.20 HEART FAILURE WITH REDUCED EJECTION FRACTION: Primary | ICD-10-CM

## 2022-11-22 ENCOUNTER — TELEPHONE (OUTPATIENT)
Dept: FAMILY MEDICINE CLINIC | Facility: CLINIC | Age: 77
End: 2022-11-22

## 2022-11-22 NOTE — TELEPHONE ENCOUNTER
Working overdue results - pt is due to follow up. Could you please call and schedule pt an appt?

## 2022-12-05 ENCOUNTER — LAB (OUTPATIENT)
Dept: LAB | Facility: OTHER | Age: 77
End: 2022-12-05

## 2022-12-05 DIAGNOSIS — D51.9 ANEMIA DUE TO VITAMIN B12 DEFICIENCY, UNSPECIFIED B12 DEFICIENCY TYPE: Chronic | ICD-10-CM

## 2022-12-05 DIAGNOSIS — I50.22 CHRONIC SYSTOLIC (CONGESTIVE) HEART FAILURE: Chronic | ICD-10-CM

## 2022-12-05 DIAGNOSIS — D64.9 NORMOCYTIC ANEMIA: Chronic | ICD-10-CM

## 2022-12-05 LAB
ALBUMIN SERPL-MCNC: 3.7 G/DL (ref 3.5–5)
ALBUMIN/GLOB SERPL: 1.1 G/DL (ref 1.1–1.8)
ALP SERPL-CCNC: 103 U/L (ref 38–126)
ALT SERPL W P-5'-P-CCNC: 21 U/L
ANION GAP SERPL CALCULATED.3IONS-SCNC: 7 MMOL/L (ref 5–15)
AST SERPL-CCNC: 31 U/L (ref 14–36)
BASOPHILS # BLD AUTO: 0.02 10*3/MM3 (ref 0–0.2)
BASOPHILS NFR BLD AUTO: 0.3 % (ref 0–1.5)
BILIRUB SERPL-MCNC: 0.5 MG/DL (ref 0.2–1.3)
BNP SERPL-MCNC: 132 PG/ML (ref 0–100)
BUN SERPL-MCNC: 21 MG/DL (ref 7–23)
BUN/CREAT SERPL: 20.2 (ref 7–25)
CALCIUM SPEC-SCNC: 9.1 MG/DL (ref 8.4–10.2)
CHLORIDE SERPL-SCNC: 105 MMOL/L (ref 101–112)
CO2 SERPL-SCNC: 27 MMOL/L (ref 22–30)
CREAT SERPL-MCNC: 1.04 MG/DL (ref 0.52–1.04)
DEPRECATED RDW RBC AUTO: 45.1 FL (ref 37–54)
EGFRCR SERPLBLD CKD-EPI 2021: 55.5 ML/MIN/1.73
EOSINOPHIL # BLD AUTO: 0.54 10*3/MM3 (ref 0–0.4)
EOSINOPHIL NFR BLD AUTO: 7.2 % (ref 0.3–6.2)
ERYTHROCYTE [DISTWIDTH] IN BLOOD BY AUTOMATED COUNT: 14.5 % (ref 12.3–15.4)
FERRITIN SERPL-MCNC: 47.9 NG/ML (ref 13–150)
GLOBULIN UR ELPH-MCNC: 3.5 GM/DL (ref 2.3–3.5)
GLUCOSE SERPL-MCNC: 95 MG/DL (ref 70–99)
HCT VFR BLD AUTO: 30.9 % (ref 34–46.6)
HGB BLD-MCNC: 10 G/DL (ref 12–15.9)
IRON 24H UR-MRATE: 46 MCG/DL (ref 37–145)
IRON SATN MFR SERPL: 11 % (ref 20–50)
LYMPHOCYTES # BLD AUTO: 1.78 10*3/MM3 (ref 0.7–3.1)
LYMPHOCYTES NFR BLD AUTO: 23.7 % (ref 19.6–45.3)
MAGNESIUM SERPL-MCNC: 2.2 MG/DL (ref 1.6–2.3)
MCH RBC QN AUTO: 28.2 PG (ref 26.6–33)
MCHC RBC AUTO-ENTMCNC: 32.4 G/DL (ref 31.5–35.7)
MCV RBC AUTO: 87.3 FL (ref 79–97)
MONOCYTES # BLD AUTO: 0.98 10*3/MM3 (ref 0.1–0.9)
MONOCYTES NFR BLD AUTO: 13 % (ref 5–12)
NEUTROPHILS NFR BLD AUTO: 4.2 10*3/MM3 (ref 1.7–7)
NEUTROPHILS NFR BLD AUTO: 55.8 % (ref 42.7–76)
PLATELET # BLD AUTO: 141 10*3/MM3 (ref 140–450)
PMV BLD AUTO: 9.5 FL (ref 6–12)
POTASSIUM SERPL-SCNC: 4.1 MMOL/L (ref 3.4–5)
PROT SERPL-MCNC: 7.2 G/DL (ref 6.3–8.6)
RBC # BLD AUTO: 3.54 10*6/MM3 (ref 3.77–5.28)
SODIUM SERPL-SCNC: 139 MMOL/L (ref 137–145)
TIBC SERPL-MCNC: 411 MCG/DL (ref 298–536)
TRANSFERRIN SERPL-MCNC: 276 MG/DL (ref 200–360)
VIT B12 BLD-MCNC: 697 PG/ML (ref 211–946)
WBC NRBC COR # BLD: 7.52 10*3/MM3 (ref 3.4–10.8)

## 2022-12-05 PROCEDURE — 83540 ASSAY OF IRON: CPT | Performed by: INTERNAL MEDICINE

## 2022-12-05 PROCEDURE — 83735 ASSAY OF MAGNESIUM: CPT | Performed by: INTERNAL MEDICINE

## 2022-12-05 PROCEDURE — 36415 COLL VENOUS BLD VENIPUNCTURE: CPT | Performed by: INTERNAL MEDICINE

## 2022-12-05 PROCEDURE — 83880 ASSAY OF NATRIURETIC PEPTIDE: CPT | Performed by: INTERNAL MEDICINE

## 2022-12-05 PROCEDURE — 82607 VITAMIN B-12: CPT | Performed by: INTERNAL MEDICINE

## 2022-12-05 PROCEDURE — 80053 COMPREHEN METABOLIC PANEL: CPT | Performed by: INTERNAL MEDICINE

## 2022-12-05 PROCEDURE — 84466 ASSAY OF TRANSFERRIN: CPT | Performed by: INTERNAL MEDICINE

## 2022-12-05 PROCEDURE — 85025 COMPLETE CBC W/AUTO DIFF WBC: CPT | Performed by: INTERNAL MEDICINE

## 2022-12-05 PROCEDURE — 82728 ASSAY OF FERRITIN: CPT | Performed by: INTERNAL MEDICINE

## 2022-12-13 ENCOUNTER — TRANSCRIBE ORDERS (OUTPATIENT)
Dept: LAB | Facility: OTHER | Age: 77
End: 2022-12-13

## 2022-12-19 ENCOUNTER — LAB (OUTPATIENT)
Dept: LAB | Facility: OTHER | Age: 77
End: 2022-12-19

## 2022-12-19 DIAGNOSIS — I50.20 HEART FAILURE WITH REDUCED EJECTION FRACTION: ICD-10-CM

## 2022-12-19 LAB
ANION GAP SERPL CALCULATED.3IONS-SCNC: 4 MMOL/L (ref 5–15)
BUN SERPL-MCNC: 19 MG/DL (ref 7–23)
BUN/CREAT SERPL: 17.3 (ref 7–25)
CALCIUM SPEC-SCNC: 9.1 MG/DL (ref 8.4–10.2)
CHLORIDE SERPL-SCNC: 106 MMOL/L (ref 101–112)
CO2 SERPL-SCNC: 30 MMOL/L (ref 22–30)
CREAT SERPL-MCNC: 1.1 MG/DL (ref 0.52–1.04)
EGFRCR SERPLBLD CKD-EPI 2021: 51.9 ML/MIN/1.73
GLUCOSE SERPL-MCNC: 94 MG/DL (ref 70–99)
POTASSIUM SERPL-SCNC: 3.7 MMOL/L (ref 3.4–5)
SODIUM SERPL-SCNC: 140 MMOL/L (ref 137–145)

## 2022-12-19 PROCEDURE — 80048 BASIC METABOLIC PNL TOTAL CA: CPT | Performed by: INTERNAL MEDICINE

## 2022-12-19 PROCEDURE — 36415 COLL VENOUS BLD VENIPUNCTURE: CPT | Performed by: INTERNAL MEDICINE
